# Patient Record
Sex: FEMALE | Race: BLACK OR AFRICAN AMERICAN | NOT HISPANIC OR LATINO | Employment: OTHER | URBAN - METROPOLITAN AREA
[De-identification: names, ages, dates, MRNs, and addresses within clinical notes are randomized per-mention and may not be internally consistent; named-entity substitution may affect disease eponyms.]

---

## 2018-01-01 ENCOUNTER — APPOINTMENT (OUTPATIENT)
Dept: GENERAL RADIOLOGY | Facility: HOSPITAL | Age: 83
End: 2018-01-01

## 2018-01-01 ENCOUNTER — APPOINTMENT (OUTPATIENT)
Dept: ULTRASOUND IMAGING | Facility: HOSPITAL | Age: 83
End: 2018-01-01

## 2018-01-01 ENCOUNTER — TELEPHONE (OUTPATIENT)
Dept: ORTHOPEDIC SURGERY | Facility: CLINIC | Age: 83
End: 2018-01-01

## 2018-01-01 ENCOUNTER — ANESTHESIA (OUTPATIENT)
Dept: PERIOP | Facility: HOSPITAL | Age: 83
End: 2018-01-01

## 2018-01-01 ENCOUNTER — ANESTHESIA EVENT (OUTPATIENT)
Dept: PERIOP | Facility: HOSPITAL | Age: 83
End: 2018-01-01

## 2018-01-01 ENCOUNTER — APPOINTMENT (OUTPATIENT)
Dept: CT IMAGING | Facility: HOSPITAL | Age: 83
End: 2018-01-01

## 2018-01-01 ENCOUNTER — HOSPITAL ENCOUNTER (INPATIENT)
Facility: HOSPITAL | Age: 83
LOS: 9 days | Discharge: NURSING, REHAB OR LONG-TERM CARE | End: 2018-12-18
Attending: EMERGENCY MEDICINE | Admitting: INTERNAL MEDICINE

## 2018-01-01 VITALS
HEIGHT: 63 IN | BODY MASS INDEX: 26.58 KG/M2 | TEMPERATURE: 98.3 F | DIASTOLIC BLOOD PRESSURE: 87 MMHG | OXYGEN SATURATION: 99 % | HEART RATE: 107 BPM | RESPIRATION RATE: 18 BRPM | SYSTOLIC BLOOD PRESSURE: 111 MMHG | WEIGHT: 150 LBS

## 2018-01-01 DIAGNOSIS — R26.89 DECREASED MOBILITY: ICD-10-CM

## 2018-01-01 DIAGNOSIS — I10 ESSENTIAL HYPERTENSION: ICD-10-CM

## 2018-01-01 DIAGNOSIS — S72.002A CLOSED FRACTURE OF LEFT HIP, INITIAL ENCOUNTER (HCC): ICD-10-CM

## 2018-01-01 DIAGNOSIS — N93.8 DUB (DYSFUNCTIONAL UTERINE BLEEDING): ICD-10-CM
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
## 2018-01-01 LAB
ABO + RH BLD: NORMAL
ABO GROUP BLD: NORMAL
ABO GROUP BLD: NORMAL
ALBUMIN SERPL-MCNC: 3.4 G/DL (ref 3.5–5.2)
ALBUMIN SERPL-MCNC: 4 G/DL (ref 3.5–5.2)
ALBUMIN/GLOB SERPL: 1 G/DL
ALBUMIN/GLOB SERPL: 1.1 G/DL
ALP SERPL-CCNC: 78 U/L (ref 39–117)
ALP SERPL-CCNC: 98 U/L (ref 39–117)
ALT SERPL W P-5'-P-CCNC: 6 U/L (ref 1–33)
ALT SERPL W P-5'-P-CCNC: 7 U/L (ref 1–33)
ANION GAP SERPL CALCULATED.3IONS-SCNC: 10.6 MMOL/L
ANION GAP SERPL CALCULATED.3IONS-SCNC: 11.2 MMOL/L
ANION GAP SERPL CALCULATED.3IONS-SCNC: 11.5 MMOL/L
ANION GAP SERPL CALCULATED.3IONS-SCNC: 11.9 MMOL/L
ANION GAP SERPL CALCULATED.3IONS-SCNC: 13.1 MMOL/L
ANION GAP SERPL CALCULATED.3IONS-SCNC: 13.2 MMOL/L
ANION GAP SERPL CALCULATED.3IONS-SCNC: 14.3 MMOL/L
ANION GAP SERPL CALCULATED.3IONS-SCNC: 15 MMOL/L
ANION GAP SERPL CALCULATED.3IONS-SCNC: 17.7 MMOL/L
AST SERPL-CCNC: 14 U/L (ref 1–32)
AST SERPL-CCNC: 17 U/L (ref 1–32)
BACTERIA UR QL AUTO: ABNORMAL /HPF
BASOPHILS # BLD AUTO: 0 10*3/MM3 (ref 0–0.2)
BASOPHILS # BLD AUTO: 0.01 10*3/MM3 (ref 0–0.2)
BASOPHILS # BLD AUTO: 0.02 10*3/MM3 (ref 0–0.2)
BASOPHILS NFR BLD AUTO: 0 % (ref 0–1.5)
BASOPHILS NFR BLD AUTO: 0.1 % (ref 0–1.5)
BASOPHILS NFR BLD AUTO: 0.2 % (ref 0–1.5)
BH BB BLOOD EXPIRATION DATE: NORMAL
BH BB BLOOD TYPE BARCODE: 5100
BH BB DISPENSE STATUS: NORMAL
BH BB PRODUCT CODE: NORMAL
BH BB UNIT NUMBER: NORMAL
BILIRUB SERPL-MCNC: 0.7 MG/DL (ref 0.1–1.2)
BILIRUB SERPL-MCNC: 0.9 MG/DL (ref 0.1–1.2)
BILIRUB UR QL STRIP: NEGATIVE
BLD GP AB SCN SERPL QL: NEGATIVE
BLD GP AB SCN SERPL QL: NEGATIVE
BUN BLD-MCNC: 10 MG/DL (ref 8–23)
BUN BLD-MCNC: 11 MG/DL (ref 8–23)
BUN BLD-MCNC: 14 MG/DL (ref 8–23)
BUN BLD-MCNC: 14 MG/DL (ref 8–23)
BUN BLD-MCNC: 15 MG/DL (ref 8–23)
BUN BLD-MCNC: 17 MG/DL (ref 8–23)
BUN BLD-MCNC: 23 MG/DL (ref 8–23)
BUN BLD-MCNC: 6 MG/DL (ref 8–23)
BUN BLD-MCNC: 9 MG/DL (ref 8–23)
BUN/CREAT SERPL: 10.2 (ref 7–25)
BUN/CREAT SERPL: 11.8 (ref 7–25)
BUN/CREAT SERPL: 12.7 (ref 7–25)
BUN/CREAT SERPL: 16.1 (ref 7–25)
BUN/CREAT SERPL: 17.7 (ref 7–25)
BUN/CREAT SERPL: 21.2 (ref 7–25)
BUN/CREAT SERPL: 24.2 (ref 7–25)
BUN/CREAT SERPL: 25.4 (ref 7–25)
BUN/CREAT SERPL: 34.3 (ref 7–25)
CALCIUM SPEC-SCNC: 8.3 MG/DL (ref 8.6–10.5)
CALCIUM SPEC-SCNC: 8.3 MG/DL (ref 8.6–10.5)
CALCIUM SPEC-SCNC: 8.5 MG/DL (ref 8.6–10.5)
CALCIUM SPEC-SCNC: 8.6 MG/DL (ref 8.6–10.5)
CALCIUM SPEC-SCNC: 8.6 MG/DL (ref 8.6–10.5)
CALCIUM SPEC-SCNC: 8.9 MG/DL (ref 8.6–10.5)
CALCIUM SPEC-SCNC: 8.9 MG/DL (ref 8.6–10.5)
CALCIUM SPEC-SCNC: 9 MG/DL (ref 8.6–10.5)
CALCIUM SPEC-SCNC: 9.5 MG/DL (ref 8.6–10.5)
CHLORIDE SERPL-SCNC: 101 MMOL/L (ref 98–107)
CHLORIDE SERPL-SCNC: 103 MMOL/L (ref 98–107)
CHLORIDE SERPL-SCNC: 104 MMOL/L (ref 98–107)
CHLORIDE SERPL-SCNC: 105 MMOL/L (ref 98–107)
CHLORIDE SERPL-SCNC: 105 MMOL/L (ref 98–107)
CHLORIDE SERPL-SCNC: 106 MMOL/L (ref 98–107)
CHLORIDE SERPL-SCNC: 108 MMOL/L (ref 98–107)
CK SERPL-CCNC: 150 U/L (ref 20–180)
CK SERPL-CCNC: 224 U/L (ref 20–180)
CLARITY UR: CLEAR
CO2 SERPL-SCNC: 21.3 MMOL/L (ref 22–29)
CO2 SERPL-SCNC: 21.7 MMOL/L (ref 22–29)
CO2 SERPL-SCNC: 21.8 MMOL/L (ref 22–29)
CO2 SERPL-SCNC: 22 MMOL/L (ref 22–29)
CO2 SERPL-SCNC: 24.1 MMOL/L (ref 22–29)
CO2 SERPL-SCNC: 24.5 MMOL/L (ref 22–29)
CO2 SERPL-SCNC: 24.9 MMOL/L (ref 22–29)
CO2 SERPL-SCNC: 25.8 MMOL/L (ref 22–29)
CO2 SERPL-SCNC: 26.4 MMOL/L (ref 22–29)
COLOR UR: YELLOW
CREAT BLD-MCNC: 0.59 MG/DL (ref 0.57–1)
CREAT BLD-MCNC: 0.62 MG/DL (ref 0.57–1)
CREAT BLD-MCNC: 0.62 MG/DL (ref 0.57–1)
CREAT BLD-MCNC: 0.66 MG/DL (ref 0.57–1)
CREAT BLD-MCNC: 0.67 MG/DL (ref 0.57–1)
CREAT BLD-MCNC: 0.67 MG/DL (ref 0.57–1)
CREAT BLD-MCNC: 0.71 MG/DL (ref 0.57–1)
CREAT BLD-MCNC: 0.85 MG/DL (ref 0.57–1)
CREAT BLD-MCNC: 0.87 MG/DL (ref 0.57–1)
DEPRECATED RDW RBC AUTO: 43.2 FL (ref 37–54)
DEPRECATED RDW RBC AUTO: 43.8 FL (ref 37–54)
DEPRECATED RDW RBC AUTO: 43.8 FL (ref 37–54)
DEPRECATED RDW RBC AUTO: 43.9 FL (ref 37–54)
DEPRECATED RDW RBC AUTO: 44.2 FL (ref 37–54)
DEPRECATED RDW RBC AUTO: 44.8 FL (ref 37–54)
DEPRECATED RDW RBC AUTO: 44.9 FL (ref 37–54)
DEPRECATED RDW RBC AUTO: 45.2 FL (ref 37–54)
DEPRECATED RDW RBC AUTO: 45.3 FL (ref 37–54)
DEPRECATED RDW RBC AUTO: 45.4 FL (ref 37–54)
EOSINOPHIL # BLD AUTO: 0 10*3/MM3 (ref 0–0.7)
EOSINOPHIL # BLD AUTO: 0 10*3/MM3 (ref 0–0.7)
EOSINOPHIL # BLD AUTO: 0.01 10*3/MM3 (ref 0–0.7)
EOSINOPHIL # BLD AUTO: 0.03 10*3/MM3 (ref 0–0.7)
EOSINOPHIL # BLD AUTO: 0.18 10*3/MM3 (ref 0–0.7)
EOSINOPHIL # BLD AUTO: 0.22 10*3/MM3 (ref 0–0.7)
EOSINOPHIL # BLD AUTO: 0.26 10*3/MM3 (ref 0–0.7)
EOSINOPHIL # BLD AUTO: 0.32 10*3/MM3 (ref 0–0.7)
EOSINOPHIL NFR BLD AUTO: 0 % (ref 0.3–6.2)
EOSINOPHIL NFR BLD AUTO: 0 % (ref 0.3–6.2)
EOSINOPHIL NFR BLD AUTO: 0.1 % (ref 0.3–6.2)
EOSINOPHIL NFR BLD AUTO: 0.2 % (ref 0.3–6.2)
EOSINOPHIL NFR BLD AUTO: 1.8 % (ref 0.3–6.2)
EOSINOPHIL NFR BLD AUTO: 2.2 % (ref 0.3–6.2)
EOSINOPHIL NFR BLD AUTO: 2.3 % (ref 0.3–6.2)
EOSINOPHIL NFR BLD AUTO: 2.5 % (ref 0.3–6.2)
ERYTHROCYTE [DISTWIDTH] IN BLOOD BY AUTOMATED COUNT: 12.9 % (ref 11.7–13)
ERYTHROCYTE [DISTWIDTH] IN BLOOD BY AUTOMATED COUNT: 13 % (ref 11.7–13)
ERYTHROCYTE [DISTWIDTH] IN BLOOD BY AUTOMATED COUNT: 13 % (ref 11.7–13)
ERYTHROCYTE [DISTWIDTH] IN BLOOD BY AUTOMATED COUNT: 13.1 % (ref 11.7–13)
ERYTHROCYTE [DISTWIDTH] IN BLOOD BY AUTOMATED COUNT: 13.3 % (ref 11.7–13)
ERYTHROCYTE [DISTWIDTH] IN BLOOD BY AUTOMATED COUNT: 13.6 % (ref 11.7–13)
ERYTHROCYTE [DISTWIDTH] IN BLOOD BY AUTOMATED COUNT: 13.8 % (ref 11.7–13)
FOLATE SERPL-MCNC: 4.77 NG/ML (ref 4.78–24.2)
GFR SERPL CREATININE-BSD FRML MDRD: 100 ML/MIN/1.73
GFR SERPL CREATININE-BSD FRML MDRD: 100 ML/MIN/1.73
GFR SERPL CREATININE-BSD FRML MDRD: 102 ML/MIN/1.73
GFR SERPL CREATININE-BSD FRML MDRD: 110 ML/MIN/1.73
GFR SERPL CREATININE-BSD FRML MDRD: 110 ML/MIN/1.73
GFR SERPL CREATININE-BSD FRML MDRD: 116 ML/MIN/1.73
GFR SERPL CREATININE-BSD FRML MDRD: 74 ML/MIN/1.73
GFR SERPL CREATININE-BSD FRML MDRD: 76 ML/MIN/1.73
GFR SERPL CREATININE-BSD FRML MDRD: 94 ML/MIN/1.73
GLOBULIN UR ELPH-MCNC: 3.3 GM/DL
GLOBULIN UR ELPH-MCNC: 3.8 GM/DL
GLUCOSE BLD-MCNC: 112 MG/DL (ref 65–99)
GLUCOSE BLD-MCNC: 120 MG/DL (ref 65–99)
GLUCOSE BLD-MCNC: 132 MG/DL (ref 65–99)
GLUCOSE BLD-MCNC: 148 MG/DL (ref 65–99)
GLUCOSE BLD-MCNC: 166 MG/DL (ref 65–99)
GLUCOSE BLD-MCNC: 182 MG/DL (ref 65–99)
GLUCOSE BLD-MCNC: 187 MG/DL (ref 65–99)
GLUCOSE BLD-MCNC: 218 MG/DL (ref 65–99)
GLUCOSE BLD-MCNC: 253 MG/DL (ref 65–99)
GLUCOSE BLDC GLUCOMTR-MCNC: 112 MG/DL (ref 70–130)
GLUCOSE BLDC GLUCOMTR-MCNC: 112 MG/DL (ref 70–130)
GLUCOSE BLDC GLUCOMTR-MCNC: 118 MG/DL (ref 70–130)
GLUCOSE BLDC GLUCOMTR-MCNC: 122 MG/DL (ref 70–130)
GLUCOSE BLDC GLUCOMTR-MCNC: 124 MG/DL (ref 70–130)
GLUCOSE BLDC GLUCOMTR-MCNC: 125 MG/DL (ref 70–130)
GLUCOSE BLDC GLUCOMTR-MCNC: 125 MG/DL (ref 70–130)
GLUCOSE BLDC GLUCOMTR-MCNC: 128 MG/DL (ref 70–130)
GLUCOSE BLDC GLUCOMTR-MCNC: 129 MG/DL (ref 70–130)
GLUCOSE BLDC GLUCOMTR-MCNC: 131 MG/DL (ref 70–130)
GLUCOSE BLDC GLUCOMTR-MCNC: 132 MG/DL (ref 70–130)
GLUCOSE BLDC GLUCOMTR-MCNC: 132 MG/DL (ref 70–130)
GLUCOSE BLDC GLUCOMTR-MCNC: 133 MG/DL (ref 70–130)
GLUCOSE BLDC GLUCOMTR-MCNC: 138 MG/DL (ref 70–130)
GLUCOSE BLDC GLUCOMTR-MCNC: 146 MG/DL (ref 70–130)
GLUCOSE BLDC GLUCOMTR-MCNC: 151 MG/DL (ref 70–130)
GLUCOSE BLDC GLUCOMTR-MCNC: 152 MG/DL (ref 70–130)
GLUCOSE BLDC GLUCOMTR-MCNC: 152 MG/DL (ref 70–130)
GLUCOSE BLDC GLUCOMTR-MCNC: 157 MG/DL (ref 70–130)
GLUCOSE BLDC GLUCOMTR-MCNC: 172 MG/DL (ref 70–130)
GLUCOSE BLDC GLUCOMTR-MCNC: 176 MG/DL (ref 70–130)
GLUCOSE BLDC GLUCOMTR-MCNC: 177 MG/DL (ref 70–130)
GLUCOSE BLDC GLUCOMTR-MCNC: 180 MG/DL (ref 70–130)
GLUCOSE BLDC GLUCOMTR-MCNC: 183 MG/DL (ref 70–130)
GLUCOSE BLDC GLUCOMTR-MCNC: 193 MG/DL (ref 70–130)
GLUCOSE BLDC GLUCOMTR-MCNC: 198 MG/DL (ref 70–130)
GLUCOSE BLDC GLUCOMTR-MCNC: 199 MG/DL (ref 70–130)
GLUCOSE BLDC GLUCOMTR-MCNC: 212 MG/DL (ref 70–130)
GLUCOSE BLDC GLUCOMTR-MCNC: 220 MG/DL (ref 70–130)
GLUCOSE BLDC GLUCOMTR-MCNC: 245 MG/DL (ref 70–130)
GLUCOSE BLDC GLUCOMTR-MCNC: 250 MG/DL (ref 70–130)
GLUCOSE BLDC GLUCOMTR-MCNC: 88 MG/DL (ref 70–130)
GLUCOSE BLDC GLUCOMTR-MCNC: 91 MG/DL (ref 70–130)
GLUCOSE BLDC GLUCOMTR-MCNC: 91 MG/DL (ref 70–130)
GLUCOSE UR STRIP-MCNC: ABNORMAL MG/DL
HBA1C MFR BLD: 6.9 % (ref 4.8–5.6)
HCT VFR BLD AUTO: 23.8 % (ref 35.6–45.5)
HCT VFR BLD AUTO: 24.5 % (ref 35.6–45.5)
HCT VFR BLD AUTO: 25.1 % (ref 35.6–45.5)
HCT VFR BLD AUTO: 27.6 % (ref 35.6–45.5)
HCT VFR BLD AUTO: 28 % (ref 35.6–45.5)
HCT VFR BLD AUTO: 28.9 % (ref 35.6–45.5)
HCT VFR BLD AUTO: 29.7 % (ref 35.6–45.5)
HCT VFR BLD AUTO: 30.9 % (ref 35.6–45.5)
HCT VFR BLD AUTO: 32.2 % (ref 35.6–45.5)
HCT VFR BLD AUTO: 39.5 % (ref 35.6–45.5)
HCT VFR BLD AUTO: 40.7 % (ref 35.6–45.5)
HCT VFR BLD AUTO: 45.3 % (ref 35.6–45.5)
HGB BLD-MCNC: 10 G/DL (ref 11.9–15.5)
HGB BLD-MCNC: 10.4 G/DL (ref 11.9–15.5)
HGB BLD-MCNC: 12.2 G/DL (ref 11.9–15.5)
HGB BLD-MCNC: 13.4 G/DL (ref 11.9–15.5)
HGB BLD-MCNC: 14.3 G/DL (ref 11.9–15.5)
HGB BLD-MCNC: 7.5 G/DL (ref 11.9–15.5)
HGB BLD-MCNC: 7.8 G/DL (ref 11.9–15.5)
HGB BLD-MCNC: 8.1 G/DL (ref 11.9–15.5)
HGB BLD-MCNC: 8.6 G/DL (ref 11.9–15.5)
HGB BLD-MCNC: 8.9 G/DL (ref 11.9–15.5)
HGB BLD-MCNC: 9.1 G/DL (ref 11.9–15.5)
HGB BLD-MCNC: 9.7 G/DL (ref 11.9–15.5)
HGB UR QL STRIP.AUTO: ABNORMAL
HYALINE CASTS UR QL AUTO: ABNORMAL /LPF
IMM GRANULOCYTES # BLD: 0.02 10*3/MM3 (ref 0–0.03)
IMM GRANULOCYTES # BLD: 0.04 10*3/MM3 (ref 0–0.03)
IMM GRANULOCYTES # BLD: 0.05 10*3/MM3 (ref 0–0.03)
IMM GRANULOCYTES # BLD: 0.05 10*3/MM3 (ref 0–0.03)
IMM GRANULOCYTES # BLD: 0.06 10*3/MM3 (ref 0–0.03)
IMM GRANULOCYTES # BLD: 0.1 10*3/MM3 (ref 0–0.03)
IMM GRANULOCYTES # BLD: 0.1 10*3/MM3 (ref 0–0.03)
IMM GRANULOCYTES # BLD: 0.13 10*3/MM3 (ref 0–0.03)
IMM GRANULOCYTES NFR BLD: 0.2 % (ref 0–0.5)
IMM GRANULOCYTES NFR BLD: 0.3 % (ref 0–0.5)
IMM GRANULOCYTES NFR BLD: 0.4 % (ref 0–0.5)
IMM GRANULOCYTES NFR BLD: 0.5 % (ref 0–0.5)
IMM GRANULOCYTES NFR BLD: 0.6 % (ref 0–0.5)
IMM GRANULOCYTES NFR BLD: 0.8 % (ref 0–0.5)
IMM GRANULOCYTES NFR BLD: 0.8 % (ref 0–0.5)
IMM GRANULOCYTES NFR BLD: 1.1 % (ref 0–0.5)
INR PPP: 1.21 (ref 0.9–1.1)
INR PPP: 1.3 (ref 0.9–1.1)
INR PPP: 1.33 (ref 0.9–1.1)
IRON 24H UR-MRATE: 36 MCG/DL (ref 37–145)
IRON SATN MFR SERPL: 16 % (ref 20–50)
KETONES UR QL STRIP: ABNORMAL
LEUKOCYTE ESTERASE UR QL STRIP.AUTO: ABNORMAL
LYMPHOCYTES # BLD AUTO: 1.21 10*3/MM3 (ref 0.9–4.8)
LYMPHOCYTES # BLD AUTO: 1.24 10*3/MM3 (ref 0.9–4.8)
LYMPHOCYTES # BLD AUTO: 1.79 10*3/MM3 (ref 0.9–4.8)
LYMPHOCYTES # BLD AUTO: 2.15 10*3/MM3 (ref 0.9–4.8)
LYMPHOCYTES # BLD AUTO: 2.25 10*3/MM3 (ref 0.9–4.8)
LYMPHOCYTES # BLD AUTO: 2.52 10*3/MM3 (ref 0.9–4.8)
LYMPHOCYTES # BLD AUTO: 2.64 10*3/MM3 (ref 0.9–4.8)
LYMPHOCYTES # BLD AUTO: 2.88 10*3/MM3 (ref 0.9–4.8)
LYMPHOCYTES NFR BLD AUTO: 10.4 % (ref 19.6–45.3)
LYMPHOCYTES NFR BLD AUTO: 20.2 % (ref 19.6–45.3)
LYMPHOCYTES NFR BLD AUTO: 20.3 % (ref 19.6–45.3)
LYMPHOCYTES NFR BLD AUTO: 21.2 % (ref 19.6–45.3)
LYMPHOCYTES NFR BLD AUTO: 21.2 % (ref 19.6–45.3)
LYMPHOCYTES NFR BLD AUTO: 23.8 % (ref 19.6–45.3)
LYMPHOCYTES NFR BLD AUTO: 24.6 % (ref 19.6–45.3)
LYMPHOCYTES NFR BLD AUTO: 9.7 % (ref 19.6–45.3)
MAGNESIUM SERPL-MCNC: 2 MG/DL (ref 1.6–2.4)
MAGNESIUM SERPL-MCNC: 2.3 MG/DL (ref 1.6–2.4)
MCH RBC QN AUTO: 29.3 PG (ref 26.9–32)
MCH RBC QN AUTO: 29.5 PG (ref 26.9–32)
MCH RBC QN AUTO: 29.5 PG (ref 26.9–32)
MCH RBC QN AUTO: 29.6 PG (ref 26.9–32)
MCH RBC QN AUTO: 29.7 PG (ref 26.9–32)
MCH RBC QN AUTO: 30 PG (ref 26.9–32)
MCH RBC QN AUTO: 30.3 PG (ref 26.9–32)
MCH RBC QN AUTO: 30.4 PG (ref 26.9–32)
MCHC RBC AUTO-ENTMCNC: 30.8 G/DL (ref 32.4–36.3)
MCHC RBC AUTO-ENTMCNC: 30.9 G/DL (ref 32.4–36.3)
MCHC RBC AUTO-ENTMCNC: 31.2 G/DL (ref 32.4–36.3)
MCHC RBC AUTO-ENTMCNC: 31.5 G/DL (ref 32.4–36.3)
MCHC RBC AUTO-ENTMCNC: 31.6 G/DL (ref 32.4–36.3)
MCHC RBC AUTO-ENTMCNC: 32.3 G/DL (ref 32.4–36.3)
MCHC RBC AUTO-ENTMCNC: 32.3 G/DL (ref 32.4–36.3)
MCHC RBC AUTO-ENTMCNC: 32.4 G/DL (ref 32.4–36.3)
MCHC RBC AUTO-ENTMCNC: 32.5 G/DL (ref 32.4–36.3)
MCHC RBC AUTO-ENTMCNC: 32.9 G/DL (ref 32.4–36.3)
MCV RBC AUTO: 90.9 FL (ref 80.5–98.2)
MCV RBC AUTO: 91.2 FL (ref 80.5–98.2)
MCV RBC AUTO: 91.4 FL (ref 80.5–98.2)
MCV RBC AUTO: 91.9 FL (ref 80.5–98.2)
MCV RBC AUTO: 92.1 FL (ref 80.5–98.2)
MCV RBC AUTO: 94.1 FL (ref 80.5–98.2)
MCV RBC AUTO: 95.1 FL (ref 80.5–98.2)
MCV RBC AUTO: 95.9 FL (ref 80.5–98.2)
MCV RBC AUTO: 96.2 FL (ref 80.5–98.2)
MCV RBC AUTO: 96.4 FL (ref 80.5–98.2)
MONOCYTES # BLD AUTO: 0.55 10*3/MM3 (ref 0.2–1.2)
MONOCYTES # BLD AUTO: 0.7 10*3/MM3 (ref 0.2–1.2)
MONOCYTES # BLD AUTO: 0.77 10*3/MM3 (ref 0.2–1.2)
MONOCYTES # BLD AUTO: 0.78 10*3/MM3 (ref 0.2–1.2)
MONOCYTES # BLD AUTO: 0.82 10*3/MM3 (ref 0.2–1.2)
MONOCYTES # BLD AUTO: 0.87 10*3/MM3 (ref 0.2–1.2)
MONOCYTES # BLD AUTO: 0.93 10*3/MM3 (ref 0.2–1.2)
MONOCYTES # BLD AUTO: 0.93 10*3/MM3 (ref 0.2–1.2)
MONOCYTES NFR BLD AUTO: 5.8 % (ref 5–12)
MONOCYTES NFR BLD AUTO: 6.3 % (ref 5–12)
MONOCYTES NFR BLD AUTO: 6.5 % (ref 5–12)
MONOCYTES NFR BLD AUTO: 6.6 % (ref 5–12)
MONOCYTES NFR BLD AUTO: 7.5 % (ref 5–12)
MONOCYTES NFR BLD AUTO: 7.5 % (ref 5–12)
MONOCYTES NFR BLD AUTO: 8.2 % (ref 5–12)
MONOCYTES NFR BLD AUTO: 8.6 % (ref 5–12)
NEUTROPHILS # BLD AUTO: 10.02 10*3/MM3 (ref 1.9–8.1)
NEUTROPHILS # BLD AUTO: 10.26 10*3/MM3 (ref 1.9–8.1)
NEUTROPHILS # BLD AUTO: 6.08 10*3/MM3 (ref 1.9–8.1)
NEUTROPHILS # BLD AUTO: 6.2 10*3/MM3 (ref 1.9–8.1)
NEUTROPHILS # BLD AUTO: 6.9 10*3/MM3 (ref 1.9–8.1)
NEUTROPHILS # BLD AUTO: 7.78 10*3/MM3 (ref 1.9–8.1)
NEUTROPHILS # BLD AUTO: 8.99 10*3/MM3 (ref 1.9–8.1)
NEUTROPHILS # BLD AUTO: 9.23 10*3/MM3 (ref 1.9–8.1)
NEUTROPHILS NFR BLD AUTO: 65.6 % (ref 42.7–76)
NEUTROPHILS NFR BLD AUTO: 66.5 % (ref 42.7–76)
NEUTROPHILS NFR BLD AUTO: 67.8 % (ref 42.7–76)
NEUTROPHILS NFR BLD AUTO: 70.7 % (ref 42.7–76)
NEUTROPHILS NFR BLD AUTO: 71.9 % (ref 42.7–76)
NEUTROPHILS NFR BLD AUTO: 72 % (ref 42.7–76)
NEUTROPHILS NFR BLD AUTO: 82.5 % (ref 42.7–76)
NEUTROPHILS NFR BLD AUTO: 83.7 % (ref 42.7–76)
NITRITE UR QL STRIP: NEGATIVE
NRBC BLD MANUAL-RTO: 0 /100 WBC (ref 0–0)
NRBC BLD MANUAL-RTO: 0.3 /100 WBC (ref 0–0)
NT-PROBNP SERPL-MCNC: 533.6 PG/ML (ref 0–1800)
PH UR STRIP.AUTO: 7 [PH] (ref 5–8)
PLATELET # BLD AUTO: 185 10*3/MM3 (ref 140–500)
PLATELET # BLD AUTO: 200 10*3/MM3 (ref 140–500)
PLATELET # BLD AUTO: 212 10*3/MM3 (ref 140–500)
PLATELET # BLD AUTO: 217 10*3/MM3 (ref 140–500)
PLATELET # BLD AUTO: 244 10*3/MM3 (ref 140–500)
PLATELET # BLD AUTO: 257 10*3/MM3 (ref 140–500)
PLATELET # BLD AUTO: 263 10*3/MM3 (ref 140–500)
PLATELET # BLD AUTO: 284 10*3/MM3 (ref 140–500)
PLATELET # BLD AUTO: 288 10*3/MM3 (ref 140–500)
PLATELET # BLD AUTO: 323 10*3/MM3 (ref 140–500)
PMV BLD AUTO: 10 FL (ref 6–12)
PMV BLD AUTO: 10.1 FL (ref 6–12)
PMV BLD AUTO: 10.5 FL (ref 6–12)
PMV BLD AUTO: 10.5 FL (ref 6–12)
PMV BLD AUTO: 10.6 FL (ref 6–12)
PMV BLD AUTO: 10.8 FL (ref 6–12)
PMV BLD AUTO: 10.9 FL (ref 6–12)
PMV BLD AUTO: 11.1 FL (ref 6–12)
PMV BLD AUTO: 11.2 FL (ref 6–12)
PMV BLD AUTO: 11.5 FL (ref 6–12)
POTASSIUM BLD-SCNC: 3.1 MMOL/L (ref 3.5–5.2)
POTASSIUM BLD-SCNC: 3.3 MMOL/L (ref 3.5–5.2)
POTASSIUM BLD-SCNC: 3.5 MMOL/L (ref 3.5–5.2)
POTASSIUM BLD-SCNC: 3.6 MMOL/L (ref 3.5–5.2)
POTASSIUM BLD-SCNC: 3.9 MMOL/L (ref 3.5–5.2)
POTASSIUM BLD-SCNC: 4.2 MMOL/L (ref 3.5–5.2)
POTASSIUM BLD-SCNC: 4.4 MMOL/L (ref 3.5–5.2)
POTASSIUM BLD-SCNC: 4.6 MMOL/L (ref 3.5–5.2)
PROT SERPL-MCNC: 6.7 G/DL (ref 6–8.5)
PROT SERPL-MCNC: 7.8 G/DL (ref 6–8.5)
PROT UR QL STRIP: ABNORMAL
PROTHROMBIN TIME: 15.1 SECONDS (ref 11.7–14.2)
PROTHROMBIN TIME: 16 SECONDS (ref 11.7–14.2)
PROTHROMBIN TIME: 16.2 SECONDS (ref 11.7–14.2)
RBC # BLD AUTO: 2.53 10*6/MM3 (ref 3.9–5.2)
RBC # BLD AUTO: 2.73 10*6/MM3 (ref 3.9–5.2)
RBC # BLD AUTO: 2.87 10*6/MM3 (ref 3.9–5.2)
RBC # BLD AUTO: 3.04 10*6/MM3 (ref 3.9–5.2)
RBC # BLD AUTO: 3.08 10*6/MM3 (ref 3.9–5.2)
RBC # BLD AUTO: 3.38 10*6/MM3 (ref 3.9–5.2)
RBC # BLD AUTO: 3.53 10*6/MM3 (ref 3.9–5.2)
RBC # BLD AUTO: 4.12 10*6/MM3 (ref 3.9–5.2)
RBC # BLD AUTO: 4.42 10*6/MM3 (ref 3.9–5.2)
RBC # BLD AUTO: 4.7 10*6/MM3 (ref 3.9–5.2)
RBC # UR: ABNORMAL /HPF
REF LAB TEST METHOD: ABNORMAL
RETICS/RBC NFR AUTO: 7.23 % (ref 0.5–1.5)
RH BLD: POSITIVE
RH BLD: POSITIVE
SODIUM BLD-SCNC: 140 MMOL/L (ref 136–145)
SODIUM BLD-SCNC: 140 MMOL/L (ref 136–145)
SODIUM BLD-SCNC: 141 MMOL/L (ref 136–145)
SODIUM BLD-SCNC: 142 MMOL/L (ref 136–145)
SODIUM BLD-SCNC: 144 MMOL/L (ref 136–145)
SP GR UR STRIP: 1.02 (ref 1–1.03)
SQUAMOUS #/AREA URNS HPF: ABNORMAL /HPF
T&S EXPIRATION DATE: NORMAL
T&S EXPIRATION DATE: NORMAL
TIBC SERPL-MCNC: 221 MCG/DL
TRANSFERRIN SERPL-MCNC: 148 MG/DL (ref 200–360)
TROPONIN T SERPL-MCNC: <0.01 NG/ML (ref 0–0.03)
TSH SERPL DL<=0.05 MIU/L-ACNC: 2.22 MIU/ML (ref 0.27–4.2)
UNIT  ABO: NORMAL
UNIT  RH: NORMAL
UROBILINOGEN UR QL STRIP: ABNORMAL
VIT B12 BLD-MCNC: 327 PG/ML (ref 211–946)
WBC NRBC COR # BLD: 10.16 10*3/MM3 (ref 4.5–10.7)
WBC NRBC COR # BLD: 11.7 10*3/MM3 (ref 4.5–10.7)
WBC NRBC COR # BLD: 11.97 10*3/MM3 (ref 4.5–10.7)
WBC NRBC COR # BLD: 12.44 10*3/MM3 (ref 4.5–10.7)
WBC NRBC COR # BLD: 12.48 10*3/MM3 (ref 4.5–10.7)
WBC NRBC COR # BLD: 12.48 10*3/MM3 (ref 4.5–10.7)
WBC NRBC COR # BLD: 13.03 10*3/MM3 (ref 4.5–10.7)
WBC NRBC COR # BLD: 8.46 10*3/MM3 (ref 4.5–10.7)
WBC NRBC COR # BLD: 9.46 10*3/MM3 (ref 4.5–10.7)
WBC NRBC COR # BLD: 9.56 10*3/MM3 (ref 4.5–10.7)
WBC UR QL AUTO: ABNORMAL /HPF

## 2018-01-01 PROCEDURE — 97110 THERAPEUTIC EXERCISES: CPT

## 2018-01-01 PROCEDURE — 70450 CT HEAD/BRAIN W/O DYE: CPT

## 2018-01-01 PROCEDURE — 81001 URINALYSIS AUTO W/SCOPE: CPT | Performed by: EMERGENCY MEDICINE

## 2018-01-01 PROCEDURE — 85025 COMPLETE CBC W/AUTO DIFF WBC: CPT | Performed by: EMERGENCY MEDICINE

## 2018-01-01 PROCEDURE — C1751 CATH, INF, PER/CENT/MIDLINE: HCPCS

## 2018-01-01 PROCEDURE — 86850 RBC ANTIBODY SCREEN: CPT | Performed by: HOSPITALIST

## 2018-01-01 PROCEDURE — 82962 GLUCOSE BLOOD TEST: CPT

## 2018-01-01 PROCEDURE — 85014 HEMATOCRIT: CPT | Performed by: INTERNAL MEDICINE

## 2018-01-01 PROCEDURE — 85025 COMPLETE CBC W/AUTO DIFF WBC: CPT | Performed by: ORTHOPAEDIC SURGERY

## 2018-01-01 PROCEDURE — 25010000002 INFLUENZA VAC SUBUNIT QUAD 0.5 ML SUSPENSION PREFILLED SYRINGE: Performed by: INTERNAL MEDICINE

## 2018-01-01 PROCEDURE — 76830 TRANSVAGINAL US NON-OB: CPT

## 2018-01-01 PROCEDURE — 99284 EMERGENCY DEPT VISIT MOD MDM: CPT

## 2018-01-01 PROCEDURE — 25010000002 PHENYLEPHRINE PER 1 ML: Performed by: ANESTHESIOLOGY

## 2018-01-01 PROCEDURE — 85610 PROTHROMBIN TIME: CPT | Performed by: EMERGENCY MEDICINE

## 2018-01-01 PROCEDURE — 0QS704Z REPOSITION LEFT UPPER FEMUR WITH INTERNAL FIXATION DEVICE, OPEN APPROACH: ICD-10-PCS | Performed by: ORTHOPAEDIC SURGERY

## 2018-01-01 PROCEDURE — 82550 ASSAY OF CK (CPK): CPT | Performed by: INTERNAL MEDICINE

## 2018-01-01 PROCEDURE — 99222 1ST HOSP IP/OBS MODERATE 55: CPT | Performed by: ORTHOPAEDIC SURGERY

## 2018-01-01 PROCEDURE — 84466 ASSAY OF TRANSFERRIN: CPT | Performed by: HOSPITALIST

## 2018-01-01 PROCEDURE — 36430 TRANSFUSION BLD/BLD COMPNT: CPT

## 2018-01-01 PROCEDURE — 25010000003 CEFAZOLIN IN DEXTROSE 2-4 GM/100ML-% SOLUTION: Performed by: ORTHOPAEDIC SURGERY

## 2018-01-01 PROCEDURE — 80048 BASIC METABOLIC PNL TOTAL CA: CPT | Performed by: HOSPITALIST

## 2018-01-01 PROCEDURE — 27245 TREAT THIGH FRACTURE: CPT | Performed by: ORTHOPAEDIC SURGERY

## 2018-01-01 PROCEDURE — 63710000001 INSULIN LISPRO (HUMAN) PER 5 UNITS: Performed by: INTERNAL MEDICINE

## 2018-01-01 PROCEDURE — C1713 ANCHOR/SCREW BN/BN,TIS/BN: HCPCS | Performed by: ORTHOPAEDIC SURGERY

## 2018-01-01 PROCEDURE — 25010000002 ONDANSETRON PER 1 MG: Performed by: ANESTHESIOLOGY

## 2018-01-01 PROCEDURE — 25010000002 PROPOFOL 10 MG/ML EMULSION: Performed by: ANESTHESIOLOGY

## 2018-01-01 PROCEDURE — 80053 COMPREHEN METABOLIC PANEL: CPT | Performed by: INTERNAL MEDICINE

## 2018-01-01 PROCEDURE — 84132 ASSAY OF SERUM POTASSIUM: CPT | Performed by: INTERNAL MEDICINE

## 2018-01-01 PROCEDURE — 85025 COMPLETE CBC W/AUTO DIFF WBC: CPT | Performed by: INTERNAL MEDICINE

## 2018-01-01 PROCEDURE — 83540 ASSAY OF IRON: CPT | Performed by: HOSPITALIST

## 2018-01-01 PROCEDURE — 80048 BASIC METABOLIC PNL TOTAL CA: CPT | Performed by: INTERNAL MEDICINE

## 2018-01-01 PROCEDURE — 85018 HEMOGLOBIN: CPT | Performed by: INTERNAL MEDICINE

## 2018-01-01 PROCEDURE — 85025 COMPLETE CBC W/AUTO DIFF WBC: CPT | Performed by: HOSPITALIST

## 2018-01-01 PROCEDURE — 82607 VITAMIN B-12: CPT | Performed by: HOSPITALIST

## 2018-01-01 PROCEDURE — 25010000002 MORPHINE (PF) 10 MG/ML SOLUTION: Performed by: EMERGENCY MEDICINE

## 2018-01-01 PROCEDURE — 85027 COMPLETE CBC AUTOMATED: CPT | Performed by: HOSPITALIST

## 2018-01-01 PROCEDURE — 76856 US EXAM PELVIC COMPLETE: CPT

## 2018-01-01 PROCEDURE — 99231 SBSQ HOSP IP/OBS SF/LOW 25: CPT | Performed by: OBSTETRICS & GYNECOLOGY

## 2018-01-01 PROCEDURE — 86900 BLOOD TYPING SEROLOGIC ABO: CPT | Performed by: ORTHOPAEDIC SURGERY

## 2018-01-01 PROCEDURE — 82550 ASSAY OF CK (CPK): CPT | Performed by: EMERGENCY MEDICINE

## 2018-01-01 PROCEDURE — 76000 FLUOROSCOPY <1 HR PHYS/QHP: CPT

## 2018-01-01 PROCEDURE — C1769 GUIDE WIRE: HCPCS | Performed by: ORTHOPAEDIC SURGERY

## 2018-01-01 PROCEDURE — 85610 PROTHROMBIN TIME: CPT | Performed by: HOSPITALIST

## 2018-01-01 PROCEDURE — P9016 RBC LEUKOCYTES REDUCED: HCPCS

## 2018-01-01 PROCEDURE — 86923 COMPATIBILITY TEST ELECTRIC: CPT

## 2018-01-01 PROCEDURE — 86900 BLOOD TYPING SEROLOGIC ABO: CPT

## 2018-01-01 PROCEDURE — 73502 X-RAY EXAM HIP UNI 2-3 VIEWS: CPT

## 2018-01-01 PROCEDURE — 93010 ELECTROCARDIOGRAM REPORT: CPT | Performed by: INTERNAL MEDICINE

## 2018-01-01 PROCEDURE — 86901 BLOOD TYPING SEROLOGIC RH(D): CPT | Performed by: ORTHOPAEDIC SURGERY

## 2018-01-01 PROCEDURE — 83036 HEMOGLOBIN GLYCOSYLATED A1C: CPT | Performed by: INTERNAL MEDICINE

## 2018-01-01 PROCEDURE — 85018 HEMOGLOBIN: CPT | Performed by: HOSPITALIST

## 2018-01-01 PROCEDURE — 25010000002 FENTANYL CITRATE (PF) 100 MCG/2ML SOLUTION: Performed by: ANESTHESIOLOGY

## 2018-01-01 PROCEDURE — 84484 ASSAY OF TROPONIN QUANT: CPT | Performed by: EMERGENCY MEDICINE

## 2018-01-01 PROCEDURE — 84443 ASSAY THYROID STIM HORMONE: CPT | Performed by: HOSPITALIST

## 2018-01-01 PROCEDURE — 83735 ASSAY OF MAGNESIUM: CPT | Performed by: INTERNAL MEDICINE

## 2018-01-01 PROCEDURE — 25010000002 DEXAMETHASONE PER 1 MG: Performed by: ANESTHESIOLOGY

## 2018-01-01 PROCEDURE — 86850 RBC ANTIBODY SCREEN: CPT | Performed by: ORTHOPAEDIC SURGERY

## 2018-01-01 PROCEDURE — 85014 HEMATOCRIT: CPT | Performed by: HOSPITALIST

## 2018-01-01 PROCEDURE — 25010000002 MORPHINE (PF) 10 MG/ML SOLUTION: Performed by: INTERNAL MEDICINE

## 2018-01-01 PROCEDURE — 73560 X-RAY EXAM OF KNEE 1 OR 2: CPT

## 2018-01-01 PROCEDURE — 97162 PT EVAL MOD COMPLEX 30 MIN: CPT

## 2018-01-01 PROCEDURE — 90661 CCIIV3 VAC ABX FR 0.5 ML IM: CPT | Performed by: INTERNAL MEDICINE

## 2018-01-01 PROCEDURE — 85045 AUTOMATED RETICULOCYTE COUNT: CPT | Performed by: HOSPITALIST

## 2018-01-01 PROCEDURE — 80053 COMPREHEN METABOLIC PANEL: CPT | Performed by: EMERGENCY MEDICINE

## 2018-01-01 PROCEDURE — G0008 ADMIN INFLUENZA VIRUS VAC: HCPCS | Performed by: INTERNAL MEDICINE

## 2018-01-01 PROCEDURE — 86901 BLOOD TYPING SEROLOGIC RH(D): CPT | Performed by: HOSPITALIST

## 2018-01-01 PROCEDURE — 93005 ELECTROCARDIOGRAM TRACING: CPT | Performed by: EMERGENCY MEDICINE

## 2018-01-01 PROCEDURE — 71045 X-RAY EXAM CHEST 1 VIEW: CPT

## 2018-01-01 PROCEDURE — 93005 ELECTROCARDIOGRAM TRACING: CPT | Performed by: HOSPITALIST

## 2018-01-01 PROCEDURE — 82746 ASSAY OF FOLIC ACID SERUM: CPT | Performed by: HOSPITALIST

## 2018-01-01 PROCEDURE — 85610 PROTHROMBIN TIME: CPT | Performed by: OBSTETRICS & GYNECOLOGY

## 2018-01-01 PROCEDURE — 85027 COMPLETE CBC AUTOMATED: CPT | Performed by: OBSTETRICS & GYNECOLOGY

## 2018-01-01 PROCEDURE — 83880 ASSAY OF NATRIURETIC PEPTIDE: CPT | Performed by: EMERGENCY MEDICINE

## 2018-01-01 PROCEDURE — 86900 BLOOD TYPING SEROLOGIC ABO: CPT | Performed by: HOSPITALIST

## 2018-01-01 DEVICE — IMPLANTABLE DEVICE: Type: IMPLANTABLE DEVICE | Site: FEMUR | Status: FUNCTIONAL

## 2018-01-01 DEVICE — SCRW LK STRDRV TI 5X40M STRL: Type: IMPLANTABLE DEVICE | Site: FEMUR | Status: FUNCTIONAL

## 2018-01-01 DEVICE — SCRW CANN TFN ADV TI 10.35X85MM STRL: Type: IMPLANTABLE DEVICE | Site: FEMUR | Status: FUNCTIONAL

## 2018-01-01 RX ORDER — PANTOPRAZOLE SODIUM 40 MG/1
40 TABLET, DELAYED RELEASE ORAL EVERY MORNING
Status: DISCONTINUED | OUTPATIENT
Start: 2018-01-01 | End: 2018-01-01 | Stop reason: HOSPADM

## 2018-01-01 RX ORDER — MORPHINE SULFATE 2 MG/ML
4 INJECTION, SOLUTION INTRAMUSCULAR; INTRAVENOUS
Status: DISCONTINUED | OUTPATIENT
Start: 2018-01-01 | End: 2018-01-01 | Stop reason: HOSPADM

## 2018-01-01 RX ORDER — MORPHINE SULFATE 2 MG/ML
2 INJECTION, SOLUTION INTRAMUSCULAR; INTRAVENOUS ONCE
Status: COMPLETED | OUTPATIENT
Start: 2018-01-01 | End: 2018-01-01

## 2018-01-01 RX ORDER — SODIUM CHLORIDE 0.9 % (FLUSH) 0.9 %
10 SYRINGE (ML) INJECTION AS NEEDED
Status: DISCONTINUED | OUTPATIENT
Start: 2018-01-01 | End: 2018-01-01 | Stop reason: HOSPADM

## 2018-01-01 RX ORDER — PROMETHAZINE HYDROCHLORIDE 25 MG/ML
12.5 INJECTION, SOLUTION INTRAMUSCULAR; INTRAVENOUS ONCE AS NEEDED
Status: DISCONTINUED | OUTPATIENT
Start: 2018-01-01 | End: 2018-01-01 | Stop reason: HOSPADM

## 2018-01-01 RX ORDER — DEXAMETHASONE SODIUM PHOSPHATE 10 MG/ML
INJECTION INTRAMUSCULAR; INTRAVENOUS AS NEEDED
Status: DISCONTINUED | OUTPATIENT
Start: 2018-01-01 | End: 2018-01-01 | Stop reason: SURG

## 2018-01-01 RX ORDER — CEFAZOLIN SODIUM 2 G/100ML
2 INJECTION, SOLUTION INTRAVENOUS EVERY 8 HOURS
Status: COMPLETED | OUTPATIENT
Start: 2018-01-01 | End: 2018-01-01

## 2018-01-01 RX ORDER — SODIUM CHLORIDE 0.9 % (FLUSH) 0.9 %
20 SYRINGE (ML) INJECTION AS NEEDED
Status: DISCONTINUED | OUTPATIENT
Start: 2018-01-01 | End: 2018-01-01 | Stop reason: HOSPADM

## 2018-01-01 RX ORDER — ASPIRIN 325 MG
325 TABLET ORAL DAILY
Status: DISCONTINUED | OUTPATIENT
Start: 2018-01-01 | End: 2018-01-01

## 2018-01-01 RX ORDER — FAMOTIDINE 10 MG/ML
20 INJECTION, SOLUTION INTRAVENOUS ONCE
Status: COMPLETED | OUTPATIENT
Start: 2018-01-01 | End: 2018-01-01

## 2018-01-01 RX ORDER — SODIUM CHLORIDE, SODIUM LACTATE, POTASSIUM CHLORIDE, CALCIUM CHLORIDE 600; 310; 30; 20 MG/100ML; MG/100ML; MG/100ML; MG/100ML
9 INJECTION, SOLUTION INTRAVENOUS CONTINUOUS
Status: DISCONTINUED | OUTPATIENT
Start: 2018-01-01 | End: 2018-01-01

## 2018-01-01 RX ORDER — BISACODYL 10 MG
10 SUPPOSITORY, RECTAL RECTAL DAILY PRN
Status: DISCONTINUED | OUTPATIENT
Start: 2018-01-01 | End: 2018-01-01 | Stop reason: HOSPADM

## 2018-01-01 RX ORDER — PROPOFOL 10 MG/ML
VIAL (ML) INTRAVENOUS AS NEEDED
Status: DISCONTINUED | OUTPATIENT
Start: 2018-01-01 | End: 2018-01-01 | Stop reason: SURG

## 2018-01-01 RX ORDER — LIDOCAINE HYDROCHLORIDE 20 MG/ML
INJECTION, SOLUTION INFILTRATION; PERINEURAL AS NEEDED
Status: DISCONTINUED | OUTPATIENT
Start: 2018-01-01 | End: 2018-01-01 | Stop reason: SURG

## 2018-01-01 RX ORDER — SODIUM CHLORIDE 0.9 % (FLUSH) 0.9 %
1-10 SYRINGE (ML) INJECTION AS NEEDED
Status: DISCONTINUED | OUTPATIENT
Start: 2018-01-01 | End: 2018-01-01 | Stop reason: HOSPADM

## 2018-01-01 RX ORDER — LIDOCAINE HYDROCHLORIDE 10 MG/ML
0.5 INJECTION, SOLUTION EPIDURAL; INFILTRATION; INTRACAUDAL; PERINEURAL ONCE AS NEEDED
Status: DISCONTINUED | OUTPATIENT
Start: 2018-01-01 | End: 2018-01-01 | Stop reason: HOSPADM

## 2018-01-01 RX ORDER — PROMETHAZINE HYDROCHLORIDE 25 MG/1
25 SUPPOSITORY RECTAL ONCE AS NEEDED
Status: DISCONTINUED | OUTPATIENT
Start: 2018-01-01 | End: 2018-01-01 | Stop reason: HOSPADM

## 2018-01-01 RX ORDER — POTASSIUM CHLORIDE 7.45 MG/ML
10 INJECTION INTRAVENOUS
Status: DISCONTINUED | OUTPATIENT
Start: 2018-01-01 | End: 2018-01-01 | Stop reason: HOSPADM

## 2018-01-01 RX ORDER — NICOTINE POLACRILEX 4 MG
15 LOZENGE BUCCAL
Status: DISCONTINUED | OUTPATIENT
Start: 2018-01-01 | End: 2018-01-01 | Stop reason: HOSPADM

## 2018-01-01 RX ORDER — LEVOTHYROXINE SODIUM 0.07 MG/1
75 TABLET ORAL DAILY
Status: DISCONTINUED | OUTPATIENT
Start: 2018-01-01 | End: 2018-01-01 | Stop reason: HOSPADM

## 2018-01-01 RX ORDER — FLUMAZENIL 0.1 MG/ML
0.2 INJECTION INTRAVENOUS AS NEEDED
Status: DISCONTINUED | OUTPATIENT
Start: 2018-01-01 | End: 2018-01-01 | Stop reason: HOSPADM

## 2018-01-01 RX ORDER — SODIUM CHLORIDE 0.9 % (FLUSH) 0.9 %
3 SYRINGE (ML) INJECTION EVERY 12 HOURS SCHEDULED
Status: DISCONTINUED | OUTPATIENT
Start: 2018-01-01 | End: 2018-01-01 | Stop reason: HOSPADM

## 2018-01-01 RX ORDER — POTASSIUM CHLORIDE 750 MG/1
40 CAPSULE, EXTENDED RELEASE ORAL AS NEEDED
Status: DISCONTINUED | OUTPATIENT
Start: 2018-01-01 | End: 2018-01-01 | Stop reason: HOSPADM

## 2018-01-01 RX ORDER — ONDANSETRON 2 MG/ML
4 INJECTION INTRAMUSCULAR; INTRAVENOUS ONCE AS NEEDED
Status: DISCONTINUED | OUTPATIENT
Start: 2018-01-01 | End: 2018-01-01 | Stop reason: HOSPADM

## 2018-01-01 RX ORDER — DONEPEZIL HYDROCHLORIDE 10 MG/1
10 TABLET, FILM COATED ORAL NIGHTLY
Status: DISCONTINUED | OUTPATIENT
Start: 2018-01-01 | End: 2018-01-01 | Stop reason: HOSPADM

## 2018-01-01 RX ORDER — DOCUSATE SODIUM 100 MG/1
100 CAPSULE, LIQUID FILLED ORAL 2 TIMES DAILY
Status: DISCONTINUED | OUTPATIENT
Start: 2018-01-01 | End: 2018-01-01 | Stop reason: HOSPADM

## 2018-01-01 RX ORDER — HYDROCODONE BITARTRATE AND ACETAMINOPHEN 5; 325 MG/1; MG/1
1 TABLET ORAL EVERY 4 HOURS PRN
Status: DISCONTINUED | OUTPATIENT
Start: 2018-01-01 | End: 2018-01-01 | Stop reason: HOSPADM

## 2018-01-01 RX ORDER — SODIUM CHLORIDE 9 MG/ML
125 INJECTION, SOLUTION INTRAVENOUS CONTINUOUS
Status: DISCONTINUED | OUTPATIENT
Start: 2018-01-01 | End: 2018-01-01

## 2018-01-01 RX ORDER — UREA 10 %
1 LOTION (ML) TOPICAL NIGHTLY PRN
Status: DISCONTINUED | OUTPATIENT
Start: 2018-01-01 | End: 2018-01-01 | Stop reason: HOSPADM

## 2018-01-01 RX ORDER — PROMETHAZINE HYDROCHLORIDE 25 MG/1
25 TABLET ORAL ONCE AS NEEDED
Status: DISCONTINUED | OUTPATIENT
Start: 2018-01-01 | End: 2018-01-01 | Stop reason: HOSPADM

## 2018-01-01 RX ORDER — SODIUM CHLORIDE, SODIUM LACTATE, POTASSIUM CHLORIDE, CALCIUM CHLORIDE 600; 310; 30; 20 MG/100ML; MG/100ML; MG/100ML; MG/100ML
75 INJECTION, SOLUTION INTRAVENOUS CONTINUOUS
Status: DISCONTINUED | OUTPATIENT
Start: 2018-01-01 | End: 2018-01-01

## 2018-01-01 RX ORDER — ONDANSETRON 2 MG/ML
INJECTION INTRAMUSCULAR; INTRAVENOUS AS NEEDED
Status: DISCONTINUED | OUTPATIENT
Start: 2018-01-01 | End: 2018-01-01 | Stop reason: SURG

## 2018-01-01 RX ORDER — FENTANYL CITRATE 50 UG/ML
50 INJECTION, SOLUTION INTRAMUSCULAR; INTRAVENOUS
Status: DISCONTINUED | OUTPATIENT
Start: 2018-01-01 | End: 2018-01-01 | Stop reason: HOSPADM

## 2018-01-01 RX ORDER — ACETAMINOPHEN 325 MG/1
650 TABLET ORAL EVERY 4 HOURS PRN
Start: 2018-01-01

## 2018-01-01 RX ORDER — MEGESTROL ACETATE 40 MG/1
40 TABLET ORAL 2 TIMES DAILY
Start: 2018-01-01

## 2018-01-01 RX ORDER — EPHEDRINE SULFATE 50 MG/ML
INJECTION, SOLUTION INTRAVENOUS AS NEEDED
Status: DISCONTINUED | OUTPATIENT
Start: 2018-01-01 | End: 2018-01-01 | Stop reason: SURG

## 2018-01-01 RX ORDER — HYDROCODONE BITARTRATE AND ACETAMINOPHEN 5; 325 MG/1; MG/1
2 TABLET ORAL EVERY 4 HOURS PRN
Status: DISCONTINUED | OUTPATIENT
Start: 2018-01-01 | End: 2018-01-01 | Stop reason: HOSPADM

## 2018-01-01 RX ORDER — NALOXONE HCL 0.4 MG/ML
0.4 VIAL (ML) INJECTION
Status: DISCONTINUED | OUTPATIENT
Start: 2018-01-01 | End: 2018-01-01 | Stop reason: HOSPADM

## 2018-01-01 RX ORDER — ACETAMINOPHEN 325 MG/1
650 TABLET ORAL EVERY 4 HOURS PRN
Status: DISCONTINUED | OUTPATIENT
Start: 2018-01-01 | End: 2018-01-01 | Stop reason: HOSPADM

## 2018-01-01 RX ORDER — ASPIRIN 325 MG
325 TABLET, DELAYED RELEASE (ENTERIC COATED) ORAL DAILY
Status: DISCONTINUED | OUTPATIENT
Start: 2018-01-01 | End: 2018-01-01 | Stop reason: CLARIF

## 2018-01-01 RX ORDER — DEXTROSE MONOHYDRATE 25 G/50ML
25 INJECTION, SOLUTION INTRAVENOUS
Status: DISCONTINUED | OUTPATIENT
Start: 2018-01-01 | End: 2018-01-01 | Stop reason: HOSPADM

## 2018-01-01 RX ORDER — FENTANYL CITRATE 50 UG/ML
INJECTION, SOLUTION INTRAMUSCULAR; INTRAVENOUS AS NEEDED
Status: DISCONTINUED | OUTPATIENT
Start: 2018-01-01 | End: 2018-01-01 | Stop reason: SURG

## 2018-01-01 RX ORDER — MORPHINE SULFATE 2 MG/ML
2 INJECTION, SOLUTION INTRAMUSCULAR; INTRAVENOUS EVERY 4 HOURS PRN
Status: DISCONTINUED | OUTPATIENT
Start: 2018-01-01 | End: 2018-01-01 | Stop reason: HOSPADM

## 2018-01-01 RX ORDER — SODIUM CHLORIDE 0.9 % (FLUSH) 0.9 %
3-10 SYRINGE (ML) INJECTION AS NEEDED
Status: DISCONTINUED | OUTPATIENT
Start: 2018-01-01 | End: 2018-01-01 | Stop reason: HOSPADM

## 2018-01-01 RX ORDER — ROCURONIUM BROMIDE 10 MG/ML
INJECTION, SOLUTION INTRAVENOUS AS NEEDED
Status: DISCONTINUED | OUTPATIENT
Start: 2018-01-01 | End: 2018-01-01 | Stop reason: SURG

## 2018-01-01 RX ORDER — CEFAZOLIN SODIUM 2 G/100ML
2 INJECTION, SOLUTION INTRAVENOUS ONCE
Status: COMPLETED | OUTPATIENT
Start: 2018-01-01 | End: 2018-01-01

## 2018-01-01 RX ORDER — BISACODYL 5 MG/1
5 TABLET, DELAYED RELEASE ORAL DAILY PRN
Status: DISCONTINUED | OUTPATIENT
Start: 2018-01-01 | End: 2018-01-01 | Stop reason: HOSPADM

## 2018-01-01 RX ORDER — MEGESTROL ACETATE 40 MG/1
40 TABLET ORAL 2 TIMES DAILY
Status: DISCONTINUED | OUTPATIENT
Start: 2018-01-01 | End: 2018-01-01 | Stop reason: HOSPADM

## 2018-01-01 RX ORDER — POTASSIUM CHLORIDE 1.5 G/1.77G
40 POWDER, FOR SOLUTION ORAL AS NEEDED
Status: DISCONTINUED | OUTPATIENT
Start: 2018-01-01 | End: 2018-01-01 | Stop reason: HOSPADM

## 2018-01-01 RX ORDER — SODIUM CHLORIDE 0.9 % (FLUSH) 0.9 %
10 SYRINGE (ML) INJECTION EVERY 12 HOURS SCHEDULED
Status: DISCONTINUED | OUTPATIENT
Start: 2018-01-01 | End: 2018-01-01 | Stop reason: HOSPADM

## 2018-01-01 RX ORDER — ATORVASTATIN CALCIUM 10 MG/1
10 TABLET, FILM COATED ORAL DAILY
Status: DISCONTINUED | OUTPATIENT
Start: 2018-01-01 | End: 2018-01-01 | Stop reason: HOSPADM

## 2018-01-01 RX ORDER — EPHEDRINE SULFATE 50 MG/ML
5 INJECTION, SOLUTION INTRAVENOUS ONCE AS NEEDED
Status: DISCONTINUED | OUTPATIENT
Start: 2018-01-01 | End: 2018-01-01 | Stop reason: HOSPADM

## 2018-01-01 RX ORDER — NALOXONE HCL 0.4 MG/ML
0.2 VIAL (ML) INJECTION AS NEEDED
Status: DISCONTINUED | OUTPATIENT
Start: 2018-01-01 | End: 2018-01-01 | Stop reason: HOSPADM

## 2018-01-01 RX ORDER — DIPHENHYDRAMINE HCL 25 MG
25 CAPSULE ORAL
Status: DISCONTINUED | OUTPATIENT
Start: 2018-01-01 | End: 2018-01-01 | Stop reason: HOSPADM

## 2018-01-01 RX ORDER — BISACODYL 5 MG/1
5 TABLET, DELAYED RELEASE ORAL DAILY PRN
Start: 2018-01-01

## 2018-01-01 RX ORDER — ACETAMINOPHEN 325 MG/1
325 TABLET ORAL EVERY 4 HOURS PRN
Status: DISCONTINUED | OUTPATIENT
Start: 2018-01-01 | End: 2018-01-01 | Stop reason: HOSPADM

## 2018-01-01 RX ORDER — SODIUM CHLORIDE 0.9 % (FLUSH) 0.9 %
3 SYRINGE (ML) INJECTION EVERY 12 HOURS SCHEDULED
Status: DISCONTINUED | OUTPATIENT
Start: 2018-01-01 | End: 2018-01-01

## 2018-01-01 RX ORDER — HYDROMORPHONE HYDROCHLORIDE 1 MG/ML
0.5 INJECTION, SOLUTION INTRAMUSCULAR; INTRAVENOUS; SUBCUTANEOUS
Status: DISCONTINUED | OUTPATIENT
Start: 2018-01-01 | End: 2018-01-01 | Stop reason: HOSPADM

## 2018-01-01 RX ADMIN — MORPHINE SULFATE 2 MG: 10 INJECTION INTRAVENOUS at 20:17

## 2018-01-01 RX ADMIN — SODIUM CHLORIDE, PRESERVATIVE FREE 3 ML: 5 INJECTION INTRAVENOUS at 21:25

## 2018-01-01 RX ADMIN — ATORVASTATIN CALCIUM 10 MG: 10 TABLET, FILM COATED ORAL at 09:34

## 2018-01-01 RX ADMIN — DONEPEZIL HYDROCHLORIDE 10 MG: 10 TABLET, FILM COATED ORAL at 21:58

## 2018-01-01 RX ADMIN — SODIUM CHLORIDE, POTASSIUM CHLORIDE, SODIUM LACTATE AND CALCIUM CHLORIDE 75 ML/HR: 600; 310; 30; 20 INJECTION, SOLUTION INTRAVENOUS at 23:05

## 2018-01-01 RX ADMIN — DOCUSATE SODIUM 100 MG: 100 CAPSULE, LIQUID FILLED ORAL at 08:32

## 2018-01-01 RX ADMIN — FENTANYL CITRATE 25 MCG: 50 INJECTION INTRAMUSCULAR; INTRAVENOUS at 16:05

## 2018-01-01 RX ADMIN — SODIUM CHLORIDE, PRESERVATIVE FREE 3 ML: 5 INJECTION INTRAVENOUS at 09:00

## 2018-01-01 RX ADMIN — MEGESTROL ACETATE 40 MG: 40 TABLET ORAL at 08:42

## 2018-01-01 RX ADMIN — DOCUSATE SODIUM 100 MG: 100 CAPSULE, LIQUID FILLED ORAL at 09:09

## 2018-01-01 RX ADMIN — METFORMIN HYDROCHLORIDE 500 MG: 500 TABLET ORAL at 08:16

## 2018-01-01 RX ADMIN — DOCUSATE SODIUM 100 MG: 100 CAPSULE, LIQUID FILLED ORAL at 21:58

## 2018-01-01 RX ADMIN — SODIUM CHLORIDE, PRESERVATIVE FREE 10 ML: 5 INJECTION INTRAVENOUS at 10:55

## 2018-01-01 RX ADMIN — PANTOPRAZOLE SODIUM 40 MG: 40 TABLET, DELAYED RELEASE ORAL at 12:06

## 2018-01-01 RX ADMIN — SODIUM CHLORIDE, PRESERVATIVE FREE 3 ML: 5 INJECTION INTRAVENOUS at 10:55

## 2018-01-01 RX ADMIN — MEGESTROL ACETATE 40 MG: 40 TABLET ORAL at 21:54

## 2018-01-01 RX ADMIN — DONEPEZIL HYDROCHLORIDE 10 MG: 10 TABLET, FILM COATED ORAL at 21:55

## 2018-01-01 RX ADMIN — MORPHINE SULFATE 2 MG: 10 INJECTION INTRAVENOUS at 20:31

## 2018-01-01 RX ADMIN — SODIUM CHLORIDE, PRESERVATIVE FREE 3 ML: 5 INJECTION INTRAVENOUS at 08:33

## 2018-01-01 RX ADMIN — PROPOFOL 80 MG: 10 INJECTION, EMULSION INTRAVENOUS at 15:28

## 2018-01-01 RX ADMIN — SODIUM CHLORIDE, PRESERVATIVE FREE 3 ML: 5 INJECTION INTRAVENOUS at 21:13

## 2018-01-01 RX ADMIN — ATORVASTATIN CALCIUM 10 MG: 10 TABLET, FILM COATED ORAL at 08:16

## 2018-01-01 RX ADMIN — DOCUSATE SODIUM 100 MG: 100 CAPSULE, LIQUID FILLED ORAL at 21:25

## 2018-01-01 RX ADMIN — LEVOTHYROXINE SODIUM 75 MCG: 75 TABLET ORAL at 06:23

## 2018-01-01 RX ADMIN — METOPROLOL TARTRATE 25 MG: 25 TABLET ORAL at 20:03

## 2018-01-01 RX ADMIN — METFORMIN HYDROCHLORIDE 500 MG: 500 TABLET ORAL at 09:16

## 2018-01-01 RX ADMIN — METFORMIN HYDROCHLORIDE 500 MG: 500 TABLET ORAL at 12:28

## 2018-01-01 RX ADMIN — ASPIRIN 325 MG: 325 TABLET, DELAYED RELEASE ORAL at 09:16

## 2018-01-01 RX ADMIN — SODIUM CHLORIDE, PRESERVATIVE FREE 3 ML: 5 INJECTION INTRAVENOUS at 09:16

## 2018-01-01 RX ADMIN — DONEPEZIL HYDROCHLORIDE 10 MG: 10 TABLET, FILM COATED ORAL at 20:03

## 2018-01-01 RX ADMIN — METOPROLOL TARTRATE 12.5 MG: 25 TABLET ORAL at 20:46

## 2018-01-01 RX ADMIN — LEVOTHYROXINE SODIUM 75 MCG: 75 TABLET ORAL at 06:28

## 2018-01-01 RX ADMIN — METFORMIN HYDROCHLORIDE 500 MG: 500 TABLET ORAL at 08:42

## 2018-01-01 RX ADMIN — FENTANYL CITRATE 25 MCG: 50 INJECTION INTRAMUSCULAR; INTRAVENOUS at 16:42

## 2018-01-01 RX ADMIN — METFORMIN HYDROCHLORIDE 500 MG: 500 TABLET ORAL at 08:32

## 2018-01-01 RX ADMIN — ATORVASTATIN CALCIUM 10 MG: 10 TABLET, FILM COATED ORAL at 09:16

## 2018-01-01 RX ADMIN — SODIUM CHLORIDE, POTASSIUM CHLORIDE, SODIUM LACTATE AND CALCIUM CHLORIDE: 600; 310; 30; 20 INJECTION, SOLUTION INTRAVENOUS at 15:10

## 2018-01-01 RX ADMIN — SODIUM CHLORIDE, PRESERVATIVE FREE 3 ML: 5 INJECTION INTRAVENOUS at 08:17

## 2018-01-01 RX ADMIN — METOPROLOL TARTRATE 25 MG: 25 TABLET ORAL at 08:32

## 2018-01-01 RX ADMIN — METFORMIN HYDROCHLORIDE 500 MG: 500 TABLET ORAL at 17:14

## 2018-01-01 RX ADMIN — EPHEDRINE SULFATE 10 MG: 50 INJECTION INTRAMUSCULAR; INTRAVENOUS; SUBCUTANEOUS at 16:24

## 2018-01-01 RX ADMIN — ONDANSETRON 2 MG: 2 INJECTION INTRAMUSCULAR; INTRAVENOUS at 16:29

## 2018-01-01 RX ADMIN — INSULIN LISPRO 4 UNITS: 100 INJECTION, SOLUTION INTRAVENOUS; SUBCUTANEOUS at 11:34

## 2018-01-01 RX ADMIN — LEVOTHYROXINE SODIUM 75 MCG: 75 TABLET ORAL at 06:24

## 2018-01-01 RX ADMIN — SODIUM CHLORIDE, PRESERVATIVE FREE 3 ML: 5 INJECTION INTRAVENOUS at 21:59

## 2018-01-01 RX ADMIN — ROCURONIUM BROMIDE 25 MG: 10 INJECTION INTRAVENOUS at 15:29

## 2018-01-01 RX ADMIN — METOPROLOL TARTRATE 25 MG: 25 TABLET ORAL at 21:59

## 2018-01-01 RX ADMIN — HYDROCODONE BITARTRATE AND ACETAMINOPHEN 2 TABLET: 5; 325 TABLET ORAL at 16:34

## 2018-01-01 RX ADMIN — LIDOCAINE HYDROCHLORIDE 20 MG: 20 INJECTION, SOLUTION INFILTRATION; PERINEURAL at 15:28

## 2018-01-01 RX ADMIN — METFORMIN HYDROCHLORIDE 500 MG: 500 TABLET ORAL at 21:58

## 2018-01-01 RX ADMIN — METOPROLOL TARTRATE 25 MG: 25 TABLET ORAL at 21:55

## 2018-01-01 RX ADMIN — ATORVASTATIN CALCIUM 10 MG: 10 TABLET, FILM COATED ORAL at 08:13

## 2018-01-01 RX ADMIN — METOPROLOL TARTRATE 25 MG: 25 TABLET ORAL at 21:09

## 2018-01-01 RX ADMIN — SODIUM CHLORIDE, PRESERVATIVE FREE 3 ML: 5 INJECTION INTRAVENOUS at 21:58

## 2018-01-01 RX ADMIN — SODIUM CHLORIDE, PRESERVATIVE FREE 3 ML: 5 INJECTION INTRAVENOUS at 09:10

## 2018-01-01 RX ADMIN — SODIUM CHLORIDE, POTASSIUM CHLORIDE, SODIUM LACTATE AND CALCIUM CHLORIDE 75 ML/HR: 600; 310; 30; 20 INJECTION, SOLUTION INTRAVENOUS at 15:48

## 2018-01-01 RX ADMIN — INSULIN LISPRO 4 UNITS: 100 INJECTION, SOLUTION INTRAVENOUS; SUBCUTANEOUS at 18:05

## 2018-01-01 RX ADMIN — DOCUSATE SODIUM 100 MG: 100 CAPSULE, LIQUID FILLED ORAL at 21:55

## 2018-01-01 RX ADMIN — DEXAMETHASONE SODIUM PHOSPHATE 4 MG: 10 INJECTION INTRAMUSCULAR; INTRAVENOUS at 16:29

## 2018-01-01 RX ADMIN — CEFAZOLIN SODIUM 2 G: 2 INJECTION, SOLUTION INTRAVENOUS at 23:05

## 2018-01-01 RX ADMIN — METOPROLOL TARTRATE 25 MG: 25 TABLET ORAL at 09:16

## 2018-01-01 RX ADMIN — SODIUM CHLORIDE, PRESERVATIVE FREE 3 ML: 5 INJECTION INTRAVENOUS at 21:55

## 2018-01-01 RX ADMIN — FAMOTIDINE 20 MG: 10 INJECTION, SOLUTION INTRAVENOUS at 14:38

## 2018-01-01 RX ADMIN — INFLUENZA A VIRUS A/SINGAPORE/GP1908/2015 IVR-180 (H1N1) ANTIGEN (MDCK CELL DERIVED, PROPIOLACTONE INACTIVATED), INFLUENZA A VIRUS A/NORTH CAROLINA/04/2016 (H3N2) HEMAGGLUTININ ANTIGEN (MDCK CELL DERIVED, PROPIOLACTONE INACTIVATED), INFLUENZA B VIRUS B/IOWA/06/2017 HEMAGGLUTININ ANTIGEN (MDCK CELL DERIVED, PROPIOLACTONE INACTIVATED), INFLUENZA B VIRUS B/SINGAPORE/INFTT-16-0610/2016 HEMAGGLUTININ ANTIGEN (MDCK CELL DERIVED, PROPIOLACTONE INACTIVATED) 0.5 ML: 15; 15; 15; 15 INJECTION, SUSPENSION INTRAMUSCULAR at 12:37

## 2018-01-01 RX ADMIN — SODIUM CHLORIDE 125 ML/HR: 9 INJECTION, SOLUTION INTRAVENOUS at 18:20

## 2018-01-01 RX ADMIN — INSULIN LISPRO 2 UNITS: 100 INJECTION, SOLUTION INTRAVENOUS; SUBCUTANEOUS at 10:22

## 2018-01-01 RX ADMIN — HYDROCODONE BITARTRATE AND ACETAMINOPHEN 1 TABLET: 5; 325 TABLET ORAL at 20:11

## 2018-01-01 RX ADMIN — DONEPEZIL HYDROCHLORIDE 10 MG: 10 TABLET, FILM COATED ORAL at 21:24

## 2018-01-01 RX ADMIN — METFORMIN HYDROCHLORIDE 500 MG: 500 TABLET ORAL at 17:36

## 2018-01-01 RX ADMIN — ASPIRIN 325 MG: 325 TABLET, DELAYED RELEASE ORAL at 08:32

## 2018-01-01 RX ADMIN — METOPROLOL TARTRATE 25 MG: 25 TABLET ORAL at 08:42

## 2018-01-01 RX ADMIN — METOPROLOL TARTRATE 25 MG: 25 TABLET ORAL at 20:11

## 2018-01-01 RX ADMIN — FENTANYL CITRATE 25 MCG: 50 INJECTION INTRAMUSCULAR; INTRAVENOUS at 16:00

## 2018-01-01 RX ADMIN — SODIUM CHLORIDE, PRESERVATIVE FREE 3 ML: 5 INJECTION INTRAVENOUS at 08:43

## 2018-01-01 RX ADMIN — SODIUM CHLORIDE, PRESERVATIVE FREE 3 ML: 5 INJECTION INTRAVENOUS at 21:12

## 2018-01-01 RX ADMIN — DONEPEZIL HYDROCHLORIDE 10 MG: 10 TABLET, FILM COATED ORAL at 20:11

## 2018-01-01 RX ADMIN — PANTOPRAZOLE SODIUM 40 MG: 40 TABLET, DELAYED RELEASE ORAL at 08:13

## 2018-01-01 RX ADMIN — INSULIN LISPRO 2 UNITS: 100 INJECTION, SOLUTION INTRAVENOUS; SUBCUTANEOUS at 12:03

## 2018-01-01 RX ADMIN — PHENYLEPHRINE HYDROCHLORIDE 100 MCG: 10 INJECTION INTRAVENOUS at 15:56

## 2018-01-01 RX ADMIN — SODIUM CHLORIDE, PRESERVATIVE FREE 3 ML: 5 INJECTION INTRAVENOUS at 20:06

## 2018-01-01 RX ADMIN — FENTANYL CITRATE 25 MCG: 50 INJECTION INTRAMUSCULAR; INTRAVENOUS at 15:27

## 2018-01-01 RX ADMIN — PHENYLEPHRINE HYDROCHLORIDE 100 MCG: 10 INJECTION INTRAVENOUS at 16:14

## 2018-01-01 RX ADMIN — INSULIN LISPRO 2 UNITS: 100 INJECTION, SOLUTION INTRAVENOUS; SUBCUTANEOUS at 21:57

## 2018-01-01 RX ADMIN — CEFAZOLIN SODIUM 2 G: 2 INJECTION, SOLUTION INTRAVENOUS at 06:00

## 2018-01-01 RX ADMIN — LEVOTHYROXINE SODIUM 75 MCG: 75 TABLET ORAL at 09:34

## 2018-01-01 RX ADMIN — DONEPEZIL HYDROCHLORIDE 10 MG: 10 TABLET, FILM COATED ORAL at 21:10

## 2018-01-01 RX ADMIN — POTASSIUM CHLORIDE 40 MEQ: 750 CAPSULE, EXTENDED RELEASE ORAL at 09:09

## 2018-01-01 RX ADMIN — INSULIN LISPRO 6 UNITS: 100 INJECTION, SOLUTION INTRAVENOUS; SUBCUTANEOUS at 12:37

## 2018-01-01 RX ADMIN — METFORMIN HYDROCHLORIDE 500 MG: 500 TABLET ORAL at 21:18

## 2018-01-01 RX ADMIN — INSULIN LISPRO 2 UNITS: 100 INJECTION, SOLUTION INTRAVENOUS; SUBCUTANEOUS at 21:16

## 2018-01-01 RX ADMIN — SODIUM CHLORIDE, PRESERVATIVE FREE 3 ML: 5 INJECTION INTRAVENOUS at 08:15

## 2018-01-01 RX ADMIN — CEFAZOLIN SODIUM 2 G: 2 INJECTION, SOLUTION INTRAVENOUS at 15:27

## 2018-01-01 RX ADMIN — SODIUM CHLORIDE, PRESERVATIVE FREE 3 ML: 5 INJECTION INTRAVENOUS at 10:23

## 2018-01-01 RX ADMIN — METOPROLOL TARTRATE 25 MG: 25 TABLET ORAL at 08:16

## 2018-01-01 RX ADMIN — PANTOPRAZOLE SODIUM 40 MG: 40 TABLET, DELAYED RELEASE ORAL at 06:23

## 2018-01-01 RX ADMIN — LEVOTHYROXINE SODIUM 75 MCG: 75 TABLET ORAL at 08:13

## 2018-01-01 RX ADMIN — METOPROLOL TARTRATE 25 MG: 25 TABLET ORAL at 21:24

## 2018-01-01 RX ADMIN — ASPIRIN 325 MG: 325 TABLET, DELAYED RELEASE ORAL at 09:09

## 2018-01-01 RX ADMIN — DOCUSATE SODIUM 100 MG: 100 CAPSULE, LIQUID FILLED ORAL at 08:16

## 2018-01-01 RX ADMIN — DONEPEZIL HYDROCHLORIDE 10 MG: 10 TABLET, FILM COATED ORAL at 21:57

## 2018-01-01 RX ADMIN — ATORVASTATIN CALCIUM 10 MG: 10 TABLET, FILM COATED ORAL at 20:03

## 2018-01-01 RX ADMIN — METFORMIN HYDROCHLORIDE 500 MG: 500 TABLET ORAL at 09:09

## 2018-01-01 RX ADMIN — SODIUM CHLORIDE 125 ML/HR: 9 INJECTION, SOLUTION INTRAVENOUS at 12:05

## 2018-01-01 RX ADMIN — METOPROLOL TARTRATE 25 MG: 25 TABLET ORAL at 09:09

## 2018-01-01 RX ADMIN — SODIUM CHLORIDE, PRESERVATIVE FREE 10 ML: 5 INJECTION INTRAVENOUS at 08:14

## 2018-01-01 RX ADMIN — INSULIN LISPRO 2 UNITS: 100 INJECTION, SOLUTION INTRAVENOUS; SUBCUTANEOUS at 17:35

## 2018-01-01 RX ADMIN — SODIUM CHLORIDE 125 ML/HR: 9 INJECTION, SOLUTION INTRAVENOUS at 20:46

## 2018-01-01 RX ADMIN — METOPROLOL TARTRATE 25 MG: 25 TABLET ORAL at 21:58

## 2018-01-01 RX ADMIN — SODIUM CHLORIDE, PRESERVATIVE FREE 10 ML: 5 INJECTION INTRAVENOUS at 21:28

## 2018-01-01 RX ADMIN — PANTOPRAZOLE SODIUM 40 MG: 40 TABLET, DELAYED RELEASE ORAL at 06:24

## 2018-01-01 RX ADMIN — PROPOFOL 20 MG: 10 INJECTION, EMULSION INTRAVENOUS at 16:01

## 2018-01-01 RX ADMIN — BISACODYL 5 MG: 5 TABLET, COATED ORAL at 09:16

## 2018-01-01 RX ADMIN — MEGESTROL ACETATE 40 MG: 40 TABLET ORAL at 09:34

## 2018-01-01 RX ADMIN — PANTOPRAZOLE SODIUM 40 MG: 40 TABLET, DELAYED RELEASE ORAL at 06:28

## 2018-01-01 RX ADMIN — DOCUSATE SODIUM 100 MG: 100 CAPSULE, LIQUID FILLED ORAL at 09:34

## 2018-01-01 RX ADMIN — METFORMIN HYDROCHLORIDE 500 MG: 500 TABLET ORAL at 12:03

## 2018-01-01 RX ADMIN — PROPOFOL 20 MG: 10 INJECTION, EMULSION INTRAVENOUS at 16:05

## 2018-01-01 RX ADMIN — ATORVASTATIN CALCIUM 10 MG: 10 TABLET, FILM COATED ORAL at 08:32

## 2018-01-01 RX ADMIN — SODIUM CHLORIDE, POTASSIUM CHLORIDE, SODIUM LACTATE AND CALCIUM CHLORIDE 9 ML/HR: 600; 310; 30; 20 INJECTION, SOLUTION INTRAVENOUS at 14:34

## 2018-01-01 RX ADMIN — METOPROLOL TARTRATE 12.5 MG: 25 TABLET ORAL at 10:22

## 2018-01-01 RX ADMIN — PHENYLEPHRINE HYDROCHLORIDE 100 MCG: 10 INJECTION INTRAVENOUS at 15:48

## 2018-01-01 RX ADMIN — LEVOTHYROXINE SODIUM 75 MCG: 75 TABLET ORAL at 08:16

## 2018-01-01 RX ADMIN — ACETAMINOPHEN 650 MG: 325 TABLET, FILM COATED ORAL at 06:23

## 2018-01-01 RX ADMIN — LEVOTHYROXINE SODIUM 75 MCG: 75 TABLET ORAL at 08:32

## 2018-01-01 RX ADMIN — INSULIN LISPRO 4 UNITS: 100 INJECTION, SOLUTION INTRAVENOUS; SUBCUTANEOUS at 08:17

## 2018-01-01 RX ADMIN — DOCUSATE SODIUM 100 MG: 100 CAPSULE, LIQUID FILLED ORAL at 09:16

## 2018-01-01 RX ADMIN — PROPOFOL 20 MG: 10 INJECTION, EMULSION INTRAVENOUS at 16:03

## 2018-01-01 RX ADMIN — HYDROCODONE BITARTRATE AND ACETAMINOPHEN 1 TABLET: 5; 325 TABLET ORAL at 21:24

## 2018-01-01 RX ADMIN — MEGESTROL ACETATE 40 MG: 40 TABLET ORAL at 20:11

## 2018-01-01 RX ADMIN — ATORVASTATIN CALCIUM 10 MG: 10 TABLET, FILM COATED ORAL at 08:43

## 2018-01-01 RX ADMIN — METOPROLOL TARTRATE 25 MG: 25 TABLET ORAL at 09:34

## 2018-01-01 RX ADMIN — PHENYLEPHRINE HYDROCHLORIDE 100 MCG: 10 INJECTION INTRAVENOUS at 16:18

## 2018-01-01 RX ADMIN — ATORVASTATIN CALCIUM 10 MG: 10 TABLET, FILM COATED ORAL at 09:09

## 2018-01-01 RX ADMIN — MEGESTROL ACETATE 40 MG: 40 TABLET ORAL at 08:13

## 2018-01-01 RX ADMIN — METOPROLOL TARTRATE 25 MG: 25 TABLET ORAL at 08:13

## 2018-01-01 RX ADMIN — ASPIRIN 325 MG: 325 TABLET, DELAYED RELEASE ORAL at 08:17

## 2018-01-01 RX ADMIN — INSULIN LISPRO 2 UNITS: 100 INJECTION, SOLUTION INTRAVENOUS; SUBCUTANEOUS at 08:32

## 2018-01-01 RX ADMIN — POTASSIUM CHLORIDE 40 MEQ: 750 CAPSULE, EXTENDED RELEASE ORAL at 12:28

## 2018-01-01 RX ADMIN — MEGESTROL ACETATE 40 MG: 40 TABLET ORAL at 21:25

## 2018-01-01 RX ADMIN — PHENYLEPHRINE HYDROCHLORIDE 100 MCG: 10 INJECTION INTRAVENOUS at 16:12

## 2018-12-09 PROBLEM — S72.142A CLOSED 2-PART INTERTROCHANTERIC FRACTURE OF LEFT FEMUR (HCC): Status: ACTIVE | Noted: 2018-01-01
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
## 2018-12-09 NOTE — ED PROVIDER NOTES
EMERGENCY DEPARTMENT ENCOUNTER    CHIEF COMPLAINT  Chief Complaint: Pain post fall  History given by: Pt's family  History limited by: Dementia  Room Number: 03/03  PMD: Sean Mayer MD      HPI:  Pt is a 89 y.o. female, Hx of Dementia, who presents by EMS with report of L hip and knee pain, worse with movement, s/p unwitnessed fall. Pt ambulates with a cane. Per family pt was last known normal at 0500 and found down in her room at 1300. Per family, they were in the house but didn't hear any falls. Per family, pt has baseline confusion that is unchanged today. Per family, pt hasn't had changes in appetite, cold, fever, urinary or bowel changes, CP, SOA, or N/V/D. Per family, pt had a mild cough that was noticed today. Per family, pt has been refusing her medications for 6 months and therefore has had none. Pt's family states they don't want to force her to take her medications and are concerned for quality of life at this point for the pt. Per family, Pt has Hx of hip replacement.     Duration:  Last normal at 0500, found down at 1300  Onset: sudden  Timing: constant  Location: L hip and knee  Radiation: n/a  Quality: unknown  Intensity/Severity: moderate  Progression: unchanged  Associated Symptoms: L hip and knee pain  Aggravating Factors: movement  Alleviating Factors: rest  Previous Episodes: Pt has Hx of R hip replacement.  Treatment before arrival: none    PAST MEDICAL HISTORY  Active Ambulatory Problems     Diagnosis Date Noted   • Anemia, pernicious 10/12/2014   • 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   • Dementia 02/19/2014   • Type II diabetes mellitus (CMS/[facility])    • Hyperlipidemia    • Benign essential hypertension 10/12/2014   • Hypothyroidism    • Vitamin D deficiency 07/26/2011   • Closed fracture of right hip with routine healing 11/04/2015   • Slow transit constipation 11/04/2015     Resolved Ambulatory Problems     Diagnosis Date Noted   • No Resolved Ambulatory Problems     Past Medical  History:   Diagnosis Date   • Anemia, pernicious 10/12/2014   • Benign essential hypertension 10/12/2014   • 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   • Dementia 02/19/2014   • Hyperlipidemia    • Hypothyroidism    • Type II diabetes mellitus (CMS/[facility])    • Vitamin D deficiency 07/26/2011       PAST SURGICAL HISTORY  History reviewed. No pertinent surgical history.    FAMILY HISTORY  Family History   Problem Relation Age of Onset   • Hypertension Other        SOCIAL HISTORY  Social History     Socioeconomic History   • Marital status: Single     Spouse name: Not on file   • Number of children: Not on file   • Years of education: Not on file   • Highest education level: Not on file   Social Needs   • Financial resource strain: Not on file   • Food insecurity - worry: Not on file   • Food insecurity - inability: Not on file   • Transportation needs - medical: Not on file   • Transportation needs - non-medical: Not on file   Occupational History   • Not on file   Tobacco Use   • Smoking status: Never Smoker   Substance and Sexual Activity   • Alcohol use: No   • Drug use: Not on file   • Sexual activity: Not on file   Other Topics Concern   • Not on file   Social History Narrative   • Not on file       ALLERGIES  Patient has no known allergies.    REVIEW OF SYSTEMS  Review of Systems   Unable to perform ROS: Dementia   all done per family    PHYSICAL EXAM  ED Triage Vitals [12/09/18 1631]   Temp Heart Rate Resp BP SpO2   99 °F (37.2 °C) 112 18 180/100 98 %      Temp src Heart Rate Source Patient Position BP Location FiO2 (%)   Tympanic -- -- -- --       Physical Exam   Constitutional:  Non-toxic appearance. She appears distressed (mild).   Awake alert, follow commands, moves all extremities, disoriented to time, situation.  When asked if she has pain she gestures at her right thigh.   HENT:   Head: Normocephalic and atraumatic.   Mouth/Throat: Oropharynx is clear and moist.   Oropharynx within normal limits.    Eyes:  EOM are normal. Pupils are equal, round, and reactive to light.   Neck: Normal range of motion. Neck supple.   Cardiovascular: Normal rate, normal heart sounds and intact distal pulses. An irregular rhythm present.   No murmur heard.  Pulses:       Posterior tibial pulses are 2+ on the right side, and 2+ on the left side.   Pulmonary/Chest: Effort normal and breath sounds normal. No respiratory distress. She exhibits no tenderness.   Abdominal: Soft. Bowel sounds are normal. There is no tenderness. There is no rebound and no guarding.   Musculoskeletal: Normal range of motion. She exhibits no edema.        Cervical back: She exhibits no tenderness.   Pain with ROM of L hip mild, no significant deformity   Neurological: She is alert.   Skin: Skin is warm and dry.   Psychiatric: Affect normal.   Nursing note and vitals reviewed.      LAB RESULTS  Lab Results (last 24 hours)     Procedure Component Value Units Date/Time    Comprehensive Metabolic Panel [[id number]] Collected:  12/09/18 1931    Specimen:  Blood Updated:  12/1934    Troponin [[id number]] Collected:  12/09/18 1931    Specimen:  Blood Updated:  12/1934    BNP [[id number]] Collected:  12/09/18 1931    Specimen:  Blood Updated:  12/1934    CK [[id number]] Collected:  12/09/18 1931    Specimen:  Blood Updated:  12/1934          I ordered the above labs and reviewed the results    RADIOLOGY  CT Head Without Contrast   Final Result   1. No acute intracranial abnormality.                           This report was finalized on 12/9/2018 7:40 PM by Anthony Ace M.D.          XR Knee 1 or 2 View Left   Preliminary Result   Degenerative disease of the left knee with no acute process   identified.       AP PELVIS AND LEFT HIP       There is an intertrochanteric fracture of the left hip showing mild   displacement. Femoral head articulates with the acetabulum. No other   fractures of the pelvis are seen.       Right hip prosthesis is seen in good  position.       IMPRESSION:  Intertrochanteric left hip fracture.       AP CHEST       Heart size is within normal limits. Lungs are underinflated with some   elevation of the right hemidiaphragm. No focal infiltrates are seen.   There is vascular crowding. Prominent soft tissue is seen in the medial   aspect of the right apex, likely related to ectatic vascular structures   and similar to the 09/05/2015 study.       IMPRESSION:  No acute process.          XR Chest 1 View   Preliminary Result   Degenerative disease of the left knee with no acute process   identified.       AP PELVIS AND LEFT HIP       There is an intertrochanteric fracture of the left hip showing mild   displacement. Femoral head articulates with the acetabulum. No other   fractures of the pelvis are seen.       Right hip prosthesis is seen in good position.       IMPRESSION:  Intertrochanteric left hip fracture.       AP CHEST       Heart size is within normal limits. Lungs are underinflated with some   elevation of the right hemidiaphragm. No focal infiltrates are seen.   There is vascular crowding. Prominent soft tissue is seen in the medial   aspect of the right apex, likely related to ectatic vascular structures   and similar to the 09/05/2015 study.       IMPRESSION:  No acute process.          XR Hip With or Without Pelvis 2 - 3 View Left   Preliminary Result   Degenerative disease of the left knee with no acute process   identified.       AP PELVIS AND LEFT HIP       There is an intertrochanteric fracture of the left hip showing mild   displacement. Femoral head articulates with the acetabulum. No other   fractures of the pelvis are seen.       Right hip prosthesis is seen in good position.       IMPRESSION:  Intertrochanteric left hip fracture.       AP CHEST       Heart size is within normal limits. Lungs are underinflated with some   elevation of the right hemidiaphragm. No focal infiltrates are seen.   There is vascular crowding.  Prominent soft tissue is seen in the medial   aspect of the right apex, likely related to ectatic vascular structures   and similar to the 09/05/2015 study.       IMPRESSION:  No acute process.               I ordered the above noted radiological studies. Interpreted by radiologist. Reviewed by me in PACS.       PROCEDURES  Procedures    EKG          EKG time: 1843  Rhythm/Rate: Tachy occasional PACs, 110s  P waves/FL: 1st degree AV block, Ps normal  QRS, axis: PRWP, Qs inferior leads  ST and T waves: nonspecific ST and T wave findings in lateral leads    Interpreted Contemporaneously by me, independently viewed  minimally changed compared to prior 9/7/15      PROGRESS AND CONSULTS       1715  Blood pressure = 180/130. Discussed plan to further evaluate with lab work and imaging. Pt is agreeable.     1720  Ordered lab work, CXR, CT Head, XR L hip, EKG, and XR L knee.     1952  Ortho consult Dr Johnson, will see in consult    1924  Discussed the pt's case with Dr. Israel, [facility] who agrees to admit the pt. He is aware that pt has labs/urine pending.     1937  Rechecked pt. Pt is resting comfortably. Notified pt of imaging, including XR L hip that shows intertrochanteric fracture of the left hip. Discussed the plan to admit pt for further monitoring and treatment. Pt understands and agrees with the plan, all questions answered.            MEDICAL DECISION MAKING  Results were reviewed/discussed with the patient and they were also made aware of online access. Pt also made aware that some labs, such as cultures, will not be resulted during ER visit and follow up with PMD is necessary.     MDM  Number of Diagnoses or Management Options     Amount and/or Complexity of Data Reviewed  Tests in the radiology section of CPT®: ordered and reviewed (XR L hip shows intertrochanteric fracture of the left hip showing mild displacement)  Tests in the medicine section of CPT®: ordered and reviewed (See EKG results in procedure.  )  Discussion of test results with the performing providers: yes (Dr. Israel, [facility])           DIAGNOSIS  Final diagnoses:   Fall, initial encounter   Closed fracture of left hip, initial encounter (CMS/[facility])   Essential hypertension       DISPOSITION  ADMISSION    Discussed treatment plan and reason for admission with pt/family and admitting physician.  Pt/family voiced understanding of the plan for admission for further testing/treatment as needed.         Latest Documented Vital Signs:  As of 7:44 PM  BP- 180/100 HR- 110 Temp- 99 °F (37.2 °C) (Tympanic) O2 sat- 98%    --  Documentation assistance provided by minnie Wang for Dr. Recio.  Information recorded by the scribe was done at my direction and has been verified and validated by me.          Sean Wang  12/09/18 1944       Desire Recio MD  12/10/18 3134

## 2018-12-10 NOTE — PLAN OF CARE
Problem: Patient Care Overview  Goal: Plan of Care Review  Outcome: Ongoing (interventions implemented as appropriate)   12/10/18 5920   Coping/Psychosocial   Plan of Care Reviewed With patient;son   Plan of Care Review   Progress no change   OTHER   Outcome Summary patient rested through out the day, son at bedside, consent signed and in chart for surgery tomorrow, VSS, room air, no c/o pain, will continue to monitor.

## 2018-12-10 NOTE — PROGRESS NOTES
Continued Stay Note  [facility]     Patient Name: Sienna Ortiz  MRN: [id number]  Today's Date: 12/10/2018    Admit Date: 12/9/2018    Discharge Plan     Row Name 12/10/18 1115       Plan    Plan  Plan to be determined-  awaiting surgery on 12/11.   LILIAN Mott    Plan Comments   FACE SHEET VERIFIED/ IM LETTER SIGNED.  Spoke to pt's son  ( Abiel Ortiz  [phone]) per phone.  Pt's PCP is Dr. Sean Mayer.  Pt lives in a two story house with son ( Abiel Ortiz) and his wife (Lalo) , pt's granddaughter  (Gilda Nair) and her two children.   Pt has a straight cane she uses at home.  Pt gets her prescriptions at [facility]   ([place] [facility]).   Pt's son denies any issues affording medications.  Pt is not current with .  Pt has been in [facility] in the past.  CCP to follow for needs post op.   LILIAN Mott        Discharge Codes    No documentation.             Eileen Crenshaw RN

## 2018-12-10 NOTE — PLAN OF CARE
Problem: Patient Care Overview  Goal: Plan of Care Review  Outcome: Ongoing (interventions implemented as appropriate)   12/10/18 0600   Coping/Psychosocial   Plan of Care Reviewed With patient   Plan of Care Review   Progress no change   OTHER   Outcome Summary pt resting in bed quietly. pt oriented to name only. not able to reorient. redirected as needed. pt denies pain. does not realize hip is broken. kept npo after midnight in case of surgery today. ortho requests cardiac clearance before surgery. will continue to monitor.      Goal: Individualization and Mutuality  Outcome: Ongoing (interventions implemented as appropriate)    Goal: Discharge Needs Assessment  Outcome: Ongoing (interventions implemented as appropriate)      Problem: Fall Risk (Adult)  Goal: Identify Related Risk Factors and Signs and Symptoms  Outcome: Outcome(s) achieved Date Met: 12/10/18    Goal: Absence of Fall  Outcome: Ongoing (interventions implemented as appropriate)      Problem: Skin Injury Risk (Adult)  Goal: Identify Related Risk Factors and Signs and Symptoms  Outcome: Outcome(s) achieved Date Met: 12/10/18    Goal: Skin Health and Integrity  Outcome: Ongoing (interventions implemented as appropriate)

## 2018-12-10 NOTE — PROGRESS NOTES
"DAILY PROGRESS NOTE  [facility]    Patient Identification:  Name: Sienna Ortiz  Age: 89 y.o.  Sex: female  :  3/4/1929  MRN: [id number]         Primary Care Physician: Sean Mayer MD      Subjective  No c/o.     Objective:  General Appearance:  Comfortable, in no acute distress and not in pain (Thin, frail).    Vital signs: (most recent): Blood pressure 116/78, pulse 110, temperature 97.6 °F (36.4 °C), temperature source Oral, resp. rate 16, height 160 cm (63\"), weight 68 kg (150 lb), SpO2 95 %, not currently breastfeeding.    Lungs:  Normal effort and normal respiratory rate.  Breath sounds clear to auscultation.    Heart: Tachycardia.  Regular rhythm.    Neurological: Patient is alert.  (Oriented only to person.  Pleasant and cooperative. ).    Skin:  Warm and dry.                Vital signs in last 24 hours:  Temp:  [97.6 °F (36.4 °C)-99 °F (37.2 °C)] 97.6 °F (36.4 °C)  Heart Rate:  [107-134] 110  Resp:  [16-18] 16  BP: (116-180)/() 116/78    Intake/Output:    Intake/Output Summary (Last 24 hours) at 12/10/2018 1419  Last data filed at 12/10/2018 1200  Gross per 24 hour   Intake 600 ml   Output --   Net 600 ml         Results from last 7 days   Lab Units  12/10/18   0632  18   2019   WBC 10*3/mm3  8.46  12.44*   HEMOGLOBIN g/dL  12.2  14.3   PLATELETS 10*3/mm3  257  288     Results from last 7 days   Lab Units  12/10/18   0632  18   1931   SODIUM mmol/L  141  140   POTASSIUM mmol/L  3.5  4.2   CHLORIDE mmol/L  106  101   CO2 mmol/L  21.8*  21.3*   BUN mg/dL  9  10   CREATININE mg/dL  0.71  0.85   GLUCOSE mg/dL  182*  218*   Estimated Creatinine Clearance: 44.1 mL/min (by C-G formula based on SCr of 0.71 mg/dL).  Results from last 7 days   Lab Units  12/10/18   0632  18   1931   CALCIUM mg/dL  9.0  9.5   ALBUMIN g/dL  3.40*  4.00     Results from last 7 days   Lab Units  12/10/18   0632  18   1931   ALBUMIN g/dL  3.40*  4.00   BILIRUBIN mg/dL  0.9  0.7   ALK " PHOS U/L  78  98   AST (SGOT) U/L  14  17   ALT (SGPT) U/L  6  7       Assessment:    Closed 2-part intertrochanteric fracture of left femur:  Ortho eval appreciated.     Dementia    Type II diabetes mellitus:  Cont SSI.  Hold Glucophage.     Benign essential hypertension w sinus tach:  Will start low dose beta blocker.     Hypothyroidism    Slow transit constipation    Fall        Plan:  Please see above.     Cresencio Qureshi MD  12/10/2018  2:19 PM

## 2018-12-10 NOTE — PROGRESS NOTES
Discharge Planning Assessment  [facility]     Patient Name: Sienna Ortiz  MRN: [id number]  Today's Date: 12/10/2018    Admit Date: 12/9/2018    Discharge Needs Assessment     Row Name 12/10/18 1113       Living Environment    Lives With  child(monica), adult;grandchild(monica)    Name(s) of Who Lives With Patient  Son ( Abiel Ortiz) and his wife (Lalo) , pt's granddaughter  (Gilda Nair) and her two children    Primary Care Provided by  child(monica)    Provides Primary Care For  no one    Family Caregiver if Needed  child(monica), adult    Family Caregiver Names  Son ( Abiel Ortiz)    Able to Return to Prior Arrangements  yes    Living Arrangement Comments  Pt lives in a two story house with son ( Abiel Ortiz) and his wife (Lalo) , pt's granddaughter  (Gilda Nair) and her two children.       Resource/Environmental Concerns    Resource/Environmental Concerns  none    Transportation Concerns  car, none       Transition Planning    Patient/Family Anticipates Transition to  home with family    Patient/Family Anticipated Services at Transition  none    Transportation Anticipated  family or friend will provide       Discharge Needs Assessment    Concerns to be Addressed  denies needs/concerns at this time    Equipment Currently Used at Home  cane, straight    Anticipated Changes Related to Illness  none    Equipment Needed After Discharge  cane, straight    Offered/Gave Vendor List  no        Discharge Plan     Row Name 12/10/18 1111       Plan    Plan  Plan to be determined-  awaiting surgery on 12/11.   LILIAN Mott    Plan Comments  FACE SHEET VERIFIED/ IM LETTER SIGNED.  Spoke to pt's son  ( Abiel Ortiz  [phone]) per phone.  Pt's PCP is Dr. Sean Mayer.  Pt lives in a two story house with son ( Abiel Ortiz) and his wife (Lalo) , pt's granddaughter  (Gilda Nair) and her two children.   Pt has a straight cane she uses at home.  Pt gets her prescriptions at [facility]   ([place] [facility]).   Pt's son denies any issues affording medications.  Pt is not current with HH.  Pt has been in [facility] in the past.  CCP to follow for needs post op.   Tiera, RN        Destination      No service coordination in this encounter.      Durable Medical Equipment      No service coordination in this encounter.      Dialysis/Infusion      No service coordination in this encounter.      Home Medical Care      No service coordination in this encounter.      Community Resources      No service coordination in this encounter.          Demographic Summary     Row Name 12/10/18 1112       General Information    Admission Type  inpatient    Arrived From  emergency department    Required Notices Provided  Important Message from Medicare    Referral Source  admission list    Reason for Consult  discharge planning    Preferred Language  English     Used During This Interaction  no        Functional Status     Row Name 12/10/18 1112       Functional Status    Usual Activity Tolerance  moderate    Current Activity Tolerance  poor       Functional Status, IADL    Medications  assistive person    Meal Preparation  assistive person    Housekeeping  completely dependent    Laundry  completely dependent    Shopping  completely dependent       Mental Status    General Appearance WDL  WDL       Mental Status Summary    Recent Changes in Mental Status/Cognitive Functioning  ability to understand;decision making/judgment        Psychosocial    No documentation.       Abuse/Neglect    No documentation.       Legal    No documentation.       Substance Abuse    No documentation.       Patient Forms    No documentation.           Eileen Crenshaw, RN

## 2018-12-10 NOTE — CONSULTS
Orthopedic Consult      Patient: Sienna Ortiz    Date of Admission: 12/9/2018  4:33 PM    YOB: 1929    Medical Record Number: [id number]    Attending Physician: Cresencio Qureshi MD  Consulting Physician:  Tayo GILMORE, orthopedic surgery.      Chief Complaints: Closed fracture of left hip, initial encounter (CMS/[facility]) [S72.002A]  Fall, initial encounter [W19.XXXA]      History of Present Illness: 89 y.o. female admitted to [facility] to services of Cresencio Qureshi MD with Closed fracture of left hip, initial encounter (CMS/[facility]) [S72.002A]  Fall, initial encounter [W19.XXXA].  I was consulted for further evaluation and treatment. Onset of symptoms was acute in onset with left lower extremity deformity at the hip and inability to bear any weight.  Symptoms are associated with shortening and external rotation deformity of the left hip at the proximal femur.  Symptoms are aggravated by any sort of active or passive motion of the hip and proximal femur.   Symptoms improve with strict bed rest.     No Known Allergies    Medications:   Home Medications:  Medications Prior to Admission   Medication Sig Dispense Refill Last Dose   • aspirin 81 MG EC tablet Take 81 mg by mouth daily. Take 1 tablet by oral route once daily for 30 days   Taking   • docusate sodium (COLACE) 100 MG capsule Take 1 capsule by mouth 2 (two) times a day. 60 capsule 5    • donepezil (ARICEPT) 10 MG tablet Take 1 tablet by mouth every night. 30 tablet 5    • levothyroxine (SYNTHROID, LEVOTHROID) 75 MCG tablet Take 1 tablet by mouth daily. 30 tablet 5    • metFORMIN (GLUCOPHAGE) 500 MG tablet Take 1 tablet by mouth 3 (three) times a day. 90 tablet 5    • metoprolol tartrate (LOPRESSOR) 25 MG tablet Take 1 tablet by mouth 2 (two) times a day. 60 tablet 5    • omeprazole OTC (PriLOSEC OTC) 20 MG EC tablet Take 20 mg by mouth daily. Take 1 tablet by oral route once daily for 30 days   Not Taking   • pravastatin (PRAVACHOL)  "40 MG tablet Take 1 tablet by mouth daily. Take 1 tablet by oral route once daily for 30 days 30 tablet 5    • UNABLE TO FIND Novofine 32 miscellaneous needle 32 X 1/4\"; use as directed with pen      • UNABLE TO FIND Vitamin B-12 injection solution 1,000 mcg/mL; inject 1 milliliter by intramuscular route once a month for 90 days          Current Medications:  Scheduled Meds:  insulin lispro 0-9 Units Subcutaneous 4x Daily With Meals & Nightly   sodium chloride 3 mL Intravenous Q12H     Continuous Infusions:  sodium chloride 125 mL/hr Last Rate: 125 mL/hr (12/10/18 1205)     PRN Meds:.•  acetaminophen  •  dextrose  •  dextrose  •  glucagon (human recombinant)  •  Morphine  •  [COMPLETED] Insert peripheral IV **AND** sodium chloride  •  sodium chloride       Past Medical History:   Diagnosis Date   • Anemia, pernicious 10/12/2014   • Benign essential hypertension 10/12/2014   • 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   • Dementia 02/19/2014   • Hyperlipidemia    • Hypothyroidism    • Type II diabetes mellitus (CMS/[facility])    • Vitamin D deficiency 07/26/2011    unspecified        Past Surgical History:   Procedure Laterality Date   • REPLACEMENT TOTAL HIP LATERAL POSITION Right         Social History     Occupational History   • Not on file   Tobacco Use   • Smoking status: Never Smoker   Substance and Sexual Activity   • Alcohol use: No   • Drug use: Not on file   • Sexual activity: Not on file    Social History     Social History Narrative   • Not on file        Family History   Problem Relation Age of Onset   • Hypertension Other          Review of Systems:   HEENT: Patient denies any headaches, vision changes, change in hearing, or tinnitus, Patient denies any rhinorrhea,epistaxis, sinus pain, mouth or dental problems, sore throat or hoarseness, or dysphagia  Pulmonary: Patient denies any cough, congestion, SOA, or wheezing  Cardiovascular: Patient denies any chest pain, dyspnea, palpitations, weakness, intolerance of " exercise, varicosities, swelling of extremities, known murmur  Gastrointestinal:  Patient denies nausea, vomiting, diarrhea, constipation, loss  of appetite, change in appetite, dysphagia, gas, heartburn, melena, change in bowel habits, use of laxatives or other drugs to alter the function of the gastrointestinal tract.  Genital/Urinary: Patient denies dysuria, change in color of urine, change in frequency of urination, pain with urgency, incontinence, retention, or nocturia.  Musculoskeletal: Patient denies increased warmth; redness; or swelling of joints; limitation of function; deformity; crepitation: pain in a joint or an extremity, the neck, or the back, especially with movement.  Neurological: Patient denies dizziness, tremor, ataxia, difficulty in speaking, change in speech, paresthesia, loss of sensation, seizures, syncope, changes in memory.  Endocrine system: Patient denies tremors, palpitations, intolerance of heat or cold, polyuria, polydipsia, polyphagia, diaphoresis, exophthalmos, or goiter.  Psychological: Patient denies thoughts/plans or harming self or other; depression,  insomnia, night terrors, radha, memory loss, disorientation.  Skin: Patient denies any bruising, rashes, discoloration, pruritus, wounds, ulcers, decubiti, changes in the hair or nails  Hematopoietic: Patient denies history of spontaneous or excessive bleeding, epistaxis, hematuria, melena, fatigue, enlarged or tender lymph nodes, pallor, history of anemia.    Physical Exam: 89 y.o. female  Vitals:    12/09/18 2100 12/10/18 0004 12/10/18 0017 12/10/18 0815   BP: (!) 168/103  Comment: rn notified (!) 124/109  Comment: rn notified 132/80 116/78   BP Location: Right arm Right arm Left arm Right arm   Patient Position: Lying Lying  Lying   Pulse: 107 110  110   Resp: 18 18  16   Temp: 97.6 °F (36.4 °C) 97.6 °F (36.4 °C)     TempSrc: Axillary Oral     SpO2: 98%   95%   Weight:       Height:         General Appearance:    Alert,  cooperative, in no acute distress                      Head:    Normocephalic, without obvious abnormality, atraumatic   Eyes:            Lids and lashes normal, conjunctivae and sclerae normal, no   icterus, no pallor, corneas clear, PERRLA   Ears:    Ears appear intact with no abnormalities noted   Throat:   No oral lesions, no thrush, oral mucosa moist   Neck:   No adenopathy, supple, trachea midline, no thyromegaly, no   carotid bruit, no JVD   Back:     No kyphosis present, no scoliosis present, no skin lesions,      erythema or scars, no tenderness to percussion or                   palpation,   range of motion normal   Lungs:     Clear to auscultation,respirations regular, even and                  unlabored    Heart:    Regular rhythm and normal rate, normal S1 and S2, no            murmur, no gallop, no rub, no click   Chest Wall:    No abnormalities observed   Abdomen:     Normal bowel sounds, no masses, no organomegaly, soft        non-tender, non-distended, no guarding, no rebound                tenderness   Rectal:     Deferred   Extremities:   Tenderness noted at anterior and lateral aspect of the left hip over the greater trochanteric bursa.  Moves all extremities well, no       edema, no cyanosis, no redness   Pulses:   Pulses palpable and equal bilaterally   Skin:   No bleeding, bruising or rash   Lymph nodes:   No palpable adenopathy   Neurologic:   Cranial nerves 2 - 12 grossly intact, sensation intact, DTR       present and equal bilaterally      left Hip. Skin and soft tissues are swollen and bruised consistent with fracture. There is a shortening with external rotation deformity. Patient is unable to perform a straight leg raise exam actively or passively. Any rotation or flexion of the hip bothers the patient very significantly. The anterior joint line is exquisitely tender. IT band is painful and tender. Greater trochanter is tender. Patient is unable to bear weight on this affected  extremity. There is no evidence of compartmental syndrome. Dorsalis pedis and posterior tibial artery pulses are palpable. Joint line is exquisitely tender. There is no evidence of a compartmental syndrome.         Diagnostic Tests:  Admission on 12/09/2018   Component Date Value Ref Range Status   • Glucose 12/09/2018 218* 65 - 99 mg/dL Final   • BUN 12/09/2018 10  8 - 23 mg/dL Final   • Creatinine 12/09/2018 0.85  0.57 - 1.00 mg/dL Final   • Sodium 12/09/2018 140  136 - 145 mmol/L Final   • Potassium 12/09/2018 4.2  3.5 - 5.2 mmol/L Final   • Chloride 12/09/2018 101  98 - 107 mmol/L Final   • CO2 12/09/2018 21.3* 22.0 - 29.0 mmol/L Final   • Calcium 12/09/2018 9.5  8.6 - 10.5 mg/dL Final   • Total Protein 12/09/2018 7.8  6.0 - 8.5 g/dL Final   • Albumin 12/09/2018 4.00  3.50 - 5.20 g/dL Final   • ALT (SGPT) 12/09/2018 7  1 - 33 U/L Final   • AST (SGOT) 12/09/2018 17  1 - 32 U/L Final    Specimen hemolyzed.  Results may be affected.   • Alkaline Phosphatase 12/09/2018 98  39 - 117 U/L Final   • Total Bilirubin 12/09/2018 0.7  0.1 - 1.2 mg/dL Final   • eGFR   Amer 12/09/2018 76  >60 mL/min/1.73 Final   • Globulin 12/09/2018 3.8  gm/dL Final   • A/G Ratio 12/09/2018 1.1  g/dL Final   • BUN/Creatinine Ratio 12/09/2018 11.8  7.0 - 25.0 Final   • Anion Gap 12/09/2018 17.7  mmol/L Final   • Protime 12/09/2018 15.1* 11.7 - 14.2 Seconds Final   • INR 12/09/2018 1.21* 0.90 - 1.10 Final   • Color, UA 12/09/2018 Yellow  Yellow, Straw Final   • Appearance, UA 12/09/2018 Clear  Clear Final   • pH, UA 12/09/2018 7.0  5.0 - 8.0 Final   • Specific Gravity, UA 12/09/2018 1.019  1.005 - 1.030 Final   • Glucose, UA 12/09/2018 250 mg/dL (1+)* Negative Final   • Ketones, UA 12/09/2018 15 mg/dL (1+)* Negative Final   • Bilirubin, UA 12/09/2018 Negative  Negative Final   • Blood, UA 12/09/2018 Small (1+)* Negative Final   • Protein, UA 12/09/2018 Trace* Negative Final   • Leuk Esterase, UA 12/09/2018 Small (1+)* Negative Final   •  Nitrite, UA 12/09/2018 Negative  Negative Final   • Urobilinogen, UA 12/09/2018 1.0 E.U./dL  0.2 - 1.0 E.U./dL Final   • Troponin T 12/09/2018 <0.010  0.000 - 0.030 ng/mL Final   • proBNP 12/09/2018 533.6  0.0-1,800.0 pg/mL Final   • Creatine Kinase 12/09/2018 224* 20 - 180 U/L Final   • WBC 12/09/2018 12.44* 4.50 - 10.70 10*3/mm3 Final   • RBC 12/09/2018 4.70  3.90 - 5.20 10*6/mm3 Final   • Hemoglobin 12/09/2018 14.3  11.9 - 15.5 g/dL Final   • Hematocrit 12/09/2018 45.3  35.6 - 45.5 % Final   • MCV 12/09/2018 96.4  80.5 - 98.2 fL Final   • MCH 12/09/2018 30.4  26.9 - 32.0 pg Final   • MCHC 12/09/2018 31.6* 32.4 - 36.3 g/dL Final   • RDW 12/09/2018 12.9  11.7 - 13.0 % Final   • RDW-SD 12/09/2018 44.9  37.0 - 54.0 fl Final   • MPV 12/09/2018 10.5  6.0 - 12.0 fL Final   • Platelets 12/09/2018 288  140 - 500 10*3/mm3 Final   • Neutrophil % 12/09/2018 82.5* 42.7 - 76.0 % Final   • Lymphocyte % 12/09/2018 9.7* 19.6 - 45.3 % Final   • Monocyte % 12/09/2018 7.5  5.0 - 12.0 % Final   • Eosinophil % 12/09/2018 0.0* 0.3 - 6.2 % Final   • Basophil % 12/09/2018 0.0  0.0 - 1.5 % Final   • Immature Grans % 12/09/2018 0.3  0.0 - 0.5 % Final   • Neutrophils, Absolute 12/09/2018 10.26* 1.90 - 8.10 10*3/mm3 Final   • Lymphocytes, Absolute 12/09/2018 1.21  0.90 - 4.80 10*3/mm3 Final   • Monocytes, Absolute 12/09/2018 0.93  0.20 - 1.20 10*3/mm3 Final   • Eosinophils, Absolute 12/09/2018 0.00  0.00 - 0.70 10*3/mm3 Final   • Basophils, Absolute 12/09/2018 0.00  0.00 - 0.20 10*3/mm3 Final   • Immature Grans, Absolute 12/09/2018 0.04* 0.00 - 0.03 10*3/mm3 Final   • RBC, UA 12/09/2018 6-12* None Seen, 0-2 /HPF Final   • WBC, UA 12/09/2018 0-2  None Seen, 0-2 /HPF Final   • Bacteria, UA 12/09/2018 None Seen  None Seen /HPF Final   • Squamous Epithelial Cells, UA 12/09/2018 0-2  None Seen, 0-2 /HPF Final   • Hyaline Casts, UA 12/09/2018 0-2  None Seen /LPF Final   • Methodology 12/09/2018 Automated Microscopy   Final   • Glucose 12/09/2018  193* 70 - 130 mg/dL Final   • WBC 12/10/2018 8.46  4.50 - 10.70 10*3/mm3 Final   • RBC 12/10/2018 4.12  3.90 - 5.20 10*6/mm3 Final   • Hemoglobin 12/10/2018 12.2  11.9 - 15.5 g/dL Final   • Hematocrit 12/10/2018 39.5  35.6 - 45.5 % Final   • MCV 12/10/2018 95.9  80.5 - 98.2 fL Final   • MCH 12/10/2018 29.6  26.9 - 32.0 pg Final   • MCHC 12/10/2018 30.9* 32.4 - 36.3 g/dL Final   • RDW 12/10/2018 13.0  11.7 - 13.0 % Final   • RDW-SD 12/10/2018 45.2  37.0 - 54.0 fl Final   • MPV 12/10/2018 10.9  6.0 - 12.0 fL Final   • Platelets 12/10/2018 257  140 - 500 10*3/mm3 Final   • Neutrophil % 12/10/2018 71.9  42.7 - 76.0 % Final   • Lymphocyte % 12/10/2018 21.2  19.6 - 45.3 % Final   • Monocyte % 12/10/2018 6.5  5.0 - 12.0 % Final   • Eosinophil % 12/10/2018 0.1* 0.3 - 6.2 % Final   • Basophil % 12/10/2018 0.1  0.0 - 1.5 % Final   • Immature Grans % 12/10/2018 0.2  0.0 - 0.5 % Final   • Neutrophils, Absolute 12/10/2018 6.08  1.90 - 8.10 10*3/mm3 Final   • Lymphocytes, Absolute 12/10/2018 1.79  0.90 - 4.80 10*3/mm3 Final   • Monocytes, Absolute 12/10/2018 0.55  0.20 - 1.20 10*3/mm3 Final   • Eosinophils, Absolute 12/10/2018 0.01  0.00 - 0.70 10*3/mm3 Final   • Basophils, Absolute 12/10/2018 0.01  0.00 - 0.20 10*3/mm3 Final   • Immature Grans, Absolute 12/10/2018 0.02  0.00 - 0.03 10*3/mm3 Final   • Glucose 12/10/2018 182* 65 - 99 mg/dL Final   • BUN 12/10/2018 9  8 - 23 mg/dL Final   • Creatinine 12/10/2018 0.71  0.57 - 1.00 mg/dL Final   • Sodium 12/10/2018 141  136 - 145 mmol/L Final   • Potassium 12/10/2018 3.5  3.5 - 5.2 mmol/L Final   • Chloride 12/10/2018 106  98 - 107 mmol/L Final   • CO2 12/10/2018 21.8* 22.0 - 29.0 mmol/L Final   • Calcium 12/10/2018 9.0  8.6 - 10.5 mg/dL Final   • Total Protein 12/10/2018 6.7  6.0 - 8.5 g/dL Final   • Albumin 12/10/2018 3.40* 3.50 - 5.20 g/dL Final   • ALT (SGPT) 12/10/2018 6  1 - 33 U/L Final   • AST (SGOT) 12/10/2018 14  1 - 32 U/L Final   • Alkaline Phosphatase 12/10/2018 78  39 -  117 U/L Final   • Total Bilirubin 12/10/2018 0.9  0.1 - 1.2 mg/dL Final   • eGFR   Amer 12/10/2018 94  >60 mL/min/1.73 Final   • Globulin 12/10/2018 3.3  gm/dL Final   • A/G Ratio 12/10/2018 1.0  g/dL Final   • BUN/Creatinine Ratio 12/10/2018 12.7  7.0 - 25.0 Final   • Anion Gap 12/10/2018 13.2  mmol/L Final   • Creatine Kinase 12/10/2018 150  20 - 180 U/L Final   • Hemoglobin A1C 12/10/2018 6.90* 4.80 - 5.60 % Final   • Glucose 12/10/2018 180* 70 - 130 mg/dL Final   • ABO Type 12/10/2018 O   Final   • RH type 12/10/2018 Positive   Final   • Antibody Screen 12/10/2018 Negative   Final   • T&S Expiration Date 12/10/2018 12/13/2018 11:59:59 PM   Final   • Glucose 12/10/2018 250* 70 - 130 mg/dL Final     No results found.    Assessment:  Patient Active Problem List   Diagnosis   • Anemia, pernicious   • Cerebellar ataxia (CMS/HCC)   • Dementia   • Type II diabetes mellitus (CMS/HCC)   • Hyperlipidemia   • Benign essential hypertension   • Hypothyroidism   • Vitamin D deficiency   • Closed fracture of right hip with routine healing   • Slow transit constipation   • Fall   • Closed 2-part intertrochanteric fracture of left femur (CMS/HCC)     X-rays are reviewed: The x-rays of the hip AP and lateral are consistent with a displaced fracture of the left proximal femur with osteoporosis.  The femur fracture is in an intertrochanteric location with eversion of the lesser trochanter.      Plan:  The patient and family voiced understanding of the risks, benefits, and alternative forms of treatment that were discussed and the patient consents to proceed with open reduction internal fixation of the left proximal femur fracture.       Discharge Plan: Unknown at this point to rehab after successful performance of the ORIF of the left femur fracture      Date: 12/10/2018    Rafa Johnson MD      Time: Critical care 20 min     DICTATED UTILIZING DRAGON DICTATION

## 2018-12-10 NOTE — H&P
Internal medicine history and physical    INTERNAL MEDICINE   Psychiatric       Patient Identification:  Name: Sienna Ortiz  Age: 89 y.o.  Sex: female  :  3/4/1929  MRN: [id number]                   Primary Care Physician: Sean Mayer MD                                   Chief Complaint:  Brought to the emergency room per family members request for evaluation of increasing left hip pain and sudden immobility.    History of Present Illness:   Patient is a 89-year-old female with significant dementia and unable to give an account of her symptoms was evidently found to be doing okay at around 5 this morning.  Patient usually ambulates with the assistance of cane.  Patient was then found down in her room around 1 PM by family members resulting in EMS being called and brought to emergency room because she was unable to stand bear weight and was complaining of pain in her left leg.  Workup in the emergency room revealed left intertrochanteric fracture.      Past Medical History:  Past Medical History:   Diagnosis Date   • Anemia, pernicious 10/12/2014   • Benign essential hypertension 10/12/2014   • Cerebellar ataxia (CMS/[facility]) 10/22/2013   • Dementia 2014   • Hyperlipidemia    • Hypothyroidism    • Type II diabetes mellitus (CMS/[facility])    • Vitamin D deficiency 2011    unspecified     Past Surgical History:  History reviewed. No pertinent surgical history.   Home Meds:    (Not in a hospital admission)  Current Meds:     Current Facility-Administered Medications:   •  [COMPLETED] Insert peripheral IV, , , Once **AND** sodium chloride 0.9 % flush 10 mL, 10 mL, Intravenous, PRN, Desire Recio MD  •  sodium chloride 0.9 % infusion, 125 mL/hr, Intravenous, Continuous, Desire Recio MD, Last Rate: 125 mL/hr at 18, 125 mL/hr at 18    Current Outpatient Medications:   •  aspirin 81 MG EC tablet, Take 81 mg by mouth daily. Take 1 tablet by oral route once daily for 30 days,  "Disp: , Rfl:   •  docusate sodium (COLACE) 100 MG capsule, Take 1 capsule by mouth 2 (two) times a day., Disp: 60 capsule, Rfl: 5  •  donepezil (ARICEPT) 10 MG tablet, Take 1 tablet by mouth every night., Disp: 30 tablet, Rfl: 5  •  levothyroxine (SYNTHROID, LEVOTHROID) 75 MCG tablet, Take 1 tablet by mouth daily., Disp: 30 tablet, Rfl: 5  •  metFORMIN (GLUCOPHAGE) 500 MG tablet, Take 1 tablet by mouth 3 (three) times a day., Disp: 90 tablet, Rfl: 5  •  metoprolol tartrate (LOPRESSOR) 25 MG tablet, Take 1 tablet by mouth 2 (two) times a day., Disp: 60 tablet, Rfl: 5  •  omeprazole OTC (PriLOSEC OTC) 20 MG EC tablet, Take 20 mg by mouth daily. Take 1 tablet by oral route once daily for 30 days, Disp: , Rfl:   •  pravastatin (PRAVACHOL) 40 MG tablet, Take 1 tablet by mouth daily. Take 1 tablet by oral route once daily for 30 days, Disp: 30 tablet, Rfl: 5  •  UNABLE TO FIND, Novofine 32 miscellaneous needle 32 X 1/4\"; use as directed with pen, Disp: , Rfl:   •  UNABLE TO FIND, Vitamin B-12 injection solution 1,000 mcg/mL; inject 1 milliliter by intramuscular route once a month for 90 days, Disp: , Rfl:   Allergies:  No Known Allergies  Social History:   Social History     Tobacco Use   • Smoking status: Never Smoker   Substance Use Topics   • Alcohol use: No      Family History:  Family History   Problem Relation Age of Onset   • Hypertension Other           Review of Systems  See history of present illness and past medical history.    Could not be obtained from the patient because of severe dementia.        Vitals:   /85   Pulse 120   Temp 99 °F (37.2 °C) (Tympanic)   Resp 18   Ht 160 cm (63\")   Wt 68 kg (150 lb)   SpO2 100%   BMI 26.57 kg/m²   I/O: No intake or output data in the 24 hours ending 12/09/18 2046  Exam:  General Appearance:    Awake uncooperative and doesn't know where she sat at this time.     Head:    Normocephalic, without obvious abnormality, atraumatic   Eyes:    PERRL, " conjunctiva/corneas clear, EOM's intact, both eyes   Ears:    Normal external ear canals, both ears   Nose:   Nares normal, septum midline, mucosa normal, no drainage    or sinus tenderness   Throat:   Lips, tongue, gums normal; oral mucosa pink and moist   Neck:   Supple, symmetrical, trachea midline, no adenopathy;     thyroid:  no enlargement/tenderness/nodules; no carotid    bruit or JVD   Back:     Symmetric, no curvature, ROM normal, no CVA tenderness   Lungs:     Clear to auscultation bilaterally, respirations unlabored   Chest Wall:    No tenderness or deformity    Heart:    Regular rate and rhythm, S1 and S2 normal, no murmur, rub   or gallop   Abdomen:     Soft, non-tender, bowel sounds active all four quadrants,     no masses, no hepatomegaly, no splenomegaly   Extremities:   Significant discomfort in the left hip with left leg being externally rotated and short.   Pulses:   Pulses palpable in all extremities; symmetric all extremities   Skin:   Skin color normal, Skin is warm and dry,  no rashes or palpable lesions   Neurologic:  Grossly nonfocal but left leg movement is significantly hampered because of the significant left femoral neck .       Data Review:      I reviewed the patient's new clinical results.  Results from last 7 days   Lab Units  12/09/18   2019   WBC 10*3/mm3  12.44*   HEMOGLOBIN g/dL  14.3   PLATELETS 10*3/mm3  288     Results from last 7 days   Lab Units  12/09/18   1931   SODIUM mmol/L  140   POTASSIUM mmol/L  4.2   CHLORIDE mmol/L  101   CO2 mmol/L  21.3*   BUN mg/dL  10   CREATININE mg/dL  0.85   CALCIUM mg/dL  9.5   GLUCOSE mg/dL  218*   Xr Knee 1 Or 2 View Left    Result Date: 12/9/2018  Degenerative disease of the left knee with no acute process identified.  AP PELVIS AND LEFT HIP  There is an intertrochanteric fracture of the left hip showing mild displacement. Femoral head articulates with the acetabulum. No other fractures of the pelvis are seen.  Right hip prosthesis is  seen in good position.  IMPRESSION:  Intertrochanteric left hip fracture.  AP CHEST  Heart size is within normal limits. Lungs are underinflated with some elevation of the right hemidiaphragm. No focal infiltrates are seen. There is vascular crowding. Prominent soft tissue is seen in the medial aspect of the right apex, likely related to ectatic vascular structures and similar to the 09/05/2015 study.  IMPRESSION:  No acute process.      Ct Head Without Contrast    Result Date: 12/9/2018  1. No acute intracranial abnormality.         This report was finalized on 12/9/2018 7:40 PM by Anthony Ace M.D.      Xr Chest 1 View    Result Date: 12/9/2018  Degenerative disease of the left knee with no acute process identified.  AP PELVIS AND LEFT HIP  There is an intertrochanteric fracture of the left hip showing mild displacement. Femoral head articulates with the acetabulum. No other fractures of the pelvis are seen.  Right hip prosthesis is seen in good position.  IMPRESSION:  Intertrochanteric left hip fracture.  AP CHEST  Heart size is within normal limits. Lungs are underinflated with some elevation of the right hemidiaphragm. No focal infiltrates are seen. There is vascular crowding. Prominent soft tissue is seen in the medial aspect of the right apex, likely related to ectatic vascular structures and similar to the 09/05/2015 study.  IMPRESSION:  No acute process.      Xr Hip With Or Without Pelvis 2 - 3 View Left    Result Date: 12/9/2018  Degenerative disease of the left knee with no acute process identified.  AP PELVIS AND LEFT HIP  There is an intertrochanteric fracture of the left hip showing mild displacement. Femoral head articulates with the acetabulum. No other fractures of the pelvis are seen.  Right hip prosthesis is seen in good position.  IMPRESSION:  Intertrochanteric left hip fracture.  AP CHEST  Heart size is within normal limits. Lungs are underinflated with some elevation of the right  hemidiaphragm. No focal infiltrates are seen. There is vascular crowding. Prominent soft tissue is seen in the medial aspect of the right apex, likely related to ectatic vascular structures and similar to the 09/05/2015 study.  IMPRESSION:  No acute process.                Assessment:  Active Hospital Problems    Diagnosis Date Noted   • **Closed 2-part intertrochanteric fracture of left femur (CMS/[facility]) [S72.142A] 12/09/2018   • Fall [W19.XXXA] 12/09/2018   • Slow transit constipation [K59.01] 11/04/2015   • Type II diabetes mellitus (CMS/[facility]) [E11.9]    • Hypothyroidism [E03.9]    • Benign essential hypertension [I10] 10/12/2014   • Dementia [F03.90] 02/19/2014       Medical decision making:  Unwitnessed fall with left hip pain and x-ray evidence of left femoral neck fracture-plan is to admit the patient consult orthopedic surgery on call keep her nothing by mouth after midnight.  Preop evaluation and clearance: Very difficult to get review of systems proper from this patient in terms of whether she is having a declining functional status from cardio vascular standpoint such as orthopnea PND swelling of her legs or dyspnea on exertion.  Given the fact that she was able to ambulate with a cane until this morning and the fact that there is no overt obvious symptoms of cardiac decompensation I think patient should be able to undergo needed left hip surgical intervention with acceptable cardiovascular risk.  Patient does need to be monitored closely intraoperatively and perioperatively for hypertension and postop complications. Prognosis of this left hip fracture due to fall in the context of severe dementia is very poor.  Type 2 diabetes-continue with Accu-Cheks and sliding scale coverage  Hypertension-continue her antihypertensive regimen  Severe dementia making her at high risk of sundowning and will stop surgical complications because of her lack of ability to participate in proper postop surgical  care.  Hypothyroidism-continue thyroid replacement therapy.    Margaret Israel MD   12/9/2018  8:46 PM  Much of this encounter note is an electronic transcription/translation of spoken language to printed text. The electronic translation of spoken language may permit erroneous, or at times, nonsensical words or phrases to be inadvertently transcribed; Although I have reviewed the note for such errors, some may still exist

## 2018-12-10 NOTE — PROGRESS NOTES
Orthopedic Consult      Patient: Sienna Ortiz    Date of Admission: 12/9/2018  4:33 PM    YOB: 1929    Medical Record Number: [id number]    Attending Physician: Cresencio Qureshi MD    Consulting Physician: Cresencio Qureshi MD    Chief Complaints: Left intertrochanteric femur fracture    History of Present Illness: 89 y.o. female admitted to [facility] to services of Cresencio Qureshi MD with an unstable intertrochanteric femur fracture. Patient reports that injury was sustained in a fall. Landed awkwardly on the affected hip. Noticed immediate pain and inability to bear weight on the leg. Was brought to the ER and diagnosed with an intertrochanteric hip fracture. Localizes the pain to the groin. Describes pain as severe, constant, worse with any movement but alleviated by rest. Denies LOC. Denies any other associated injury or complaint prior to this she has used a cane for ambulation and lives with her son at his house.    She did have a right hip fracture several years ago treated with a bipolar and went to rehabilitation thereafter.    Allergies: No Known Allergies    Medications:   Home Medications:    Current Facility-Administered Medications:   •  acetaminophen (TYLENOL) tablet 650 mg, 650 mg, Oral, Q4H PRN, Margaret Israel MD  •  dextrose (D50W) 25 g/ 50mL Intravenous Solution 25 g, 25 g, Intravenous, Q15 Min PRN, Margaret Israel MD  •  dextrose (GLUTOSE) oral gel 15 g, 15 g, Oral, Q15 Min PRN, Margaret Israel MD  •  glucagon (human recombinant) (GLUCAGEN DIAGNOSTIC) injection 1 mg, 1 mg, Subcutaneous, PRN, Margaret Israel MD  •  insulin lispro (humaLOG) injection 0-9 Units, 0-9 Units, Subcutaneous, 4x Daily With Meals & Nightly, Margaret Israel MD, 6 Units at 12/10/18 1237  •  morphine injection 2 mg, 2 mg, Intravenous, Q4H PRN, Margaret Israel MD  •  [COMPLETED] Insert peripheral IV, , , Once **AND** sodium chloride 0.9 % flush 10 mL, 10 mL, Intravenous, PRN, Desire Recio MD  •   sodium chloride 0.9 % flush 3 mL, 3 mL, Intravenous, Q12H, Margaret Israel MD, 3 mL at 12/10/18 0900  •  sodium chloride 0.9 % flush 3-10 mL, 3-10 mL, Intravenous, PRN, Margaret Israel MD  •  sodium chloride 0.9 % infusion, 125 mL/hr, Intravenous, Continuous, Desire Recio MD, Last Rate: 125 mL/hr at 12/10/18 1205, 125 mL/hr at 12/10/18 1205    Current Medications:  Scheduled Meds:  insulin lispro 0-9 Units Subcutaneous 4x Daily With Meals & Nightly   sodium chloride 3 mL Intravenous Q12H     Continuous Infusions:  sodium chloride 125 mL/hr Last Rate: 125 mL/hr (12/10/18 1205)     PRN Meds:.•  acetaminophen  •  dextrose  •  dextrose  •  glucagon (human recombinant)  •  Morphine  •  [COMPLETED] Insert peripheral IV **AND** sodium chloride  •  sodium chloride    Past Medical History:   Diagnosis Date   • Anemia, pernicious 10/12/2014   • Benign essential hypertension 10/12/2014   • 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   • Dementia 02/19/2014   • Hyperlipidemia    • Hypothyroidism    • Type II diabetes mellitus (CMS/[facility])    • Vitamin D deficiency 07/26/2011    unspecified       Past Surgical History:   Procedure Laterality Date   • REPLACEMENT TOTAL HIP LATERAL POSITION Right        Social History     Occupational History   • Not on file   Tobacco Use   • Smoking status: Never Smoker   Substance and Sexual Activity   • Alcohol use: No   • Drug use: Not on file   • Sexual activity: Not on file    Social History     Social History Narrative   • Not on file       Family History   Problem Relation Age of Onset   • Hypertension Other          Review of Systems:     Constitutional:  Denies fever, shaking or chills   Eyes:  Denies change in visual acuity   HEENT:  Denies nasal congestion or sore throat   Respiratory:  Denies cough or shortness of breath   Cardiovascular:  Denies chest pain or edema  Endocrine: Denies tremors, palpitations, intolerance of heat or cold, polyuria, polydipsia.  GI:  Denies abdominal pain, nausea,  vomiting, bloody stools or diarrhea  :  Denies frequency, urgency, incontinence, retention, or nocturia.  Musculoskeletal:  Denies numbness tingling or loss of motor function except as above  Integument:  Denies rash, lesion or ulceration   Neurologic:  Denies headache or focal weakness, deficits  Heme:  Denies epistaxis, spontaneous or excessive bleeding, epistaxis, hematuria, melena, fatigue, enlarged or tender lymph nodes.      All other pertinent positives and negatives as noted above in HPI.    Physical Exam: 89 y.o. female    General:  Awake, alert. No acute distress.      Head/Neck:  Normocephalic, atraumatic.  Conjunctiva and sclera clear.  Hearing adequate for the exam.  Neck is supple with normal ROM.    Psych:  Affect and demeanor appropriate.    CV:  Regular rate and rhythm.  Hemodynamically stable.    Lungs:  Good chest expansion, breathing unlabored.    Abdomen:  Soft.  Non-tender, non-distended.    Extremities:      Left lower extremity: Skin appears benign without obvious lacerations, ulcerations or lesions. Leg appears shortened and externally rotated. Compartments soft without evidence for DVT or compartment syndrome. No atrophy. NO palpable masses or adenopathy. Focal TTP over hip. ROM of hip extremely limited and uncomfortable. NO pain with passive motion of knee, ankle. Strength well-preserved distally. Sensation to light touch grossly intact distally. Good skin turgor, brisk cap refill  distally.      All other extremities atraumatic without gross abnormality. Contralateral leg demonstrates no bony tenderness. Good hip, knee motion. NO instability. Good motor and sensory function distally with brisk cap refill.    Diagnostic Tests:    Admission on 12/09/2018   Component Date Value Ref Range Status   • Glucose 12/09/2018 218* 65 - 99 mg/dL Final   • BUN 12/09/2018 10  8 - 23 mg/dL Final   • Creatinine 12/09/2018 0.85  0.57 - 1.00 mg/dL Final   • Sodium 12/09/2018 140  136 - 145 mmol/L Final    • Potassium 12/09/2018 4.2  3.5 - 5.2 mmol/L Final   • Chloride 12/09/2018 101  98 - 107 mmol/L Final   • CO2 12/09/2018 21.3* 22.0 - 29.0 mmol/L Final   • Calcium 12/09/2018 9.5  8.6 - 10.5 mg/dL Final   • Total Protein 12/09/2018 7.8  6.0 - 8.5 g/dL Final   • Albumin 12/09/2018 4.00  3.50 - 5.20 g/dL Final   • ALT (SGPT) 12/09/2018 7  1 - 33 U/L Final   • AST (SGOT) 12/09/2018 17  1 - 32 U/L Final    Specimen hemolyzed.  Results may be affected.   • Alkaline Phosphatase 12/09/2018 98  39 - 117 U/L Final   • Total Bilirubin 12/09/2018 0.7  0.1 - 1.2 mg/dL Final   • eGFR   Amer 12/09/2018 76  >60 mL/min/1.73 Final   • Globulin 12/09/2018 3.8  gm/dL Final   • A/G Ratio 12/09/2018 1.1  g/dL Final   • BUN/Creatinine Ratio 12/09/2018 11.8  7.0 - 25.0 Final   • Anion Gap 12/09/2018 17.7  mmol/L Final   • Protime 12/09/2018 15.1* 11.7 - 14.2 Seconds Final   • INR 12/09/2018 1.21* 0.90 - 1.10 Final   • Color, UA 12/09/2018 Yellow  Yellow, Straw Final   • Appearance, UA 12/09/2018 Clear  Clear Final   • pH, UA 12/09/2018 7.0  5.0 - 8.0 Final   • Specific Gravity, UA 12/09/2018 1.019  1.005 - 1.030 Final   • Glucose, UA 12/09/2018 250 mg/dL (1+)* Negative Final   • Ketones, UA 12/09/2018 15 mg/dL (1+)* Negative Final   • Bilirubin, UA 12/09/2018 Negative  Negative Final   • Blood, UA 12/09/2018 Small (1+)* Negative Final   • Protein, UA 12/09/2018 Trace* Negative Final   • Leuk Esterase, UA 12/09/2018 Small (1+)* Negative Final   • Nitrite, UA 12/09/2018 Negative  Negative Final   • Urobilinogen, UA 12/09/2018 1.0 E.U./dL  0.2 - 1.0 E.U./dL Final   • Troponin T 12/09/2018 <0.010  0.000 - 0.030 ng/mL Final   • proBNP 12/09/2018 533.6  0.0-1,800.0 pg/mL Final   • Creatine Kinase 12/09/2018 224* 20 - 180 U/L Final   • WBC 12/09/2018 12.44* 4.50 - 10.70 10*3/mm3 Final   • RBC 12/09/2018 4.70  3.90 - 5.20 10*6/mm3 Final   • Hemoglobin 12/09/2018 14.3  11.9 - 15.5 g/dL Final   • Hematocrit 12/09/2018 45.3  35.6 - 45.5 %  Final   • MCV 12/09/2018 96.4  80.5 - 98.2 fL Final   • MCH 12/09/2018 30.4  26.9 - 32.0 pg Final   • MCHC 12/09/2018 31.6* 32.4 - 36.3 g/dL Final   • RDW 12/09/2018 12.9  11.7 - 13.0 % Final   • RDW-SD 12/09/2018 44.9  37.0 - 54.0 fl Final   • MPV 12/09/2018 10.5  6.0 - 12.0 fL Final   • Platelets 12/09/2018 288  140 - 500 10*3/mm3 Final   • Neutrophil % 12/09/2018 82.5* 42.7 - 76.0 % Final   • Lymphocyte % 12/09/2018 9.7* 19.6 - 45.3 % Final   • Monocyte % 12/09/2018 7.5  5.0 - 12.0 % Final   • Eosinophil % 12/09/2018 0.0* 0.3 - 6.2 % Final   • Basophil % 12/09/2018 0.0  0.0 - 1.5 % Final   • Immature Grans % 12/09/2018 0.3  0.0 - 0.5 % Final   • Neutrophils, Absolute 12/09/2018 10.26* 1.90 - 8.10 10*3/mm3 Final   • Lymphocytes, Absolute 12/09/2018 1.21  0.90 - 4.80 10*3/mm3 Final   • Monocytes, Absolute 12/09/2018 0.93  0.20 - 1.20 10*3/mm3 Final   • Eosinophils, Absolute 12/09/2018 0.00  0.00 - 0.70 10*3/mm3 Final   • Basophils, Absolute 12/09/2018 0.00  0.00 - 0.20 10*3/mm3 Final   • Immature Grans, Absolute 12/09/2018 0.04* 0.00 - 0.03 10*3/mm3 Final   • RBC, UA 12/09/2018 6-12* None Seen, 0-2 /HPF Final   • WBC, UA 12/09/2018 0-2  None Seen, 0-2 /HPF Final   • Bacteria, UA 12/09/2018 None Seen  None Seen /HPF Final   • Squamous Epithelial Cells, UA 12/09/2018 0-2  None Seen, 0-2 /HPF Final   • Hyaline Casts, UA 12/09/2018 0-2  None Seen /LPF Final   • Methodology 12/09/2018 Automated Microscopy   Final   • Glucose 12/09/2018 193* 70 - 130 mg/dL Final   • WBC 12/10/2018 8.46  4.50 - 10.70 10*3/mm3 Final   • RBC 12/10/2018 4.12  3.90 - 5.20 10*6/mm3 Final   • Hemoglobin 12/10/2018 12.2  11.9 - 15.5 g/dL Final   • Hematocrit 12/10/2018 39.5  35.6 - 45.5 % Final   • MCV 12/10/2018 95.9  80.5 - 98.2 fL Final   • MCH 12/10/2018 29.6  26.9 - 32.0 pg Final   • MCHC 12/10/2018 30.9* 32.4 - 36.3 g/dL Final   • RDW 12/10/2018 13.0  11.7 - 13.0 % Final   • RDW-SD 12/10/2018 45.2  37.0 - 54.0 fl Final   • MPV 12/10/2018  10.9  6.0 - 12.0 fL Final   • Platelets 12/10/2018 257  140 - 500 10*3/mm3 Final   • Neutrophil % 12/10/2018 71.9  42.7 - 76.0 % Final   • Lymphocyte % 12/10/2018 21.2  19.6 - 45.3 % Final   • Monocyte % 12/10/2018 6.5  5.0 - 12.0 % Final   • Eosinophil % 12/10/2018 0.1* 0.3 - 6.2 % Final   • Basophil % 12/10/2018 0.1  0.0 - 1.5 % Final   • Immature Grans % 12/10/2018 0.2  0.0 - 0.5 % Final   • Neutrophils, Absolute 12/10/2018 6.08  1.90 - 8.10 10*3/mm3 Final   • Lymphocytes, Absolute 12/10/2018 1.79  0.90 - 4.80 10*3/mm3 Final   • Monocytes, Absolute 12/10/2018 0.55  0.20 - 1.20 10*3/mm3 Final   • Eosinophils, Absolute 12/10/2018 0.01  0.00 - 0.70 10*3/mm3 Final   • Basophils, Absolute 12/10/2018 0.01  0.00 - 0.20 10*3/mm3 Final   • Immature Grans, Absolute 12/10/2018 0.02  0.00 - 0.03 10*3/mm3 Final   • Glucose 12/10/2018 182* 65 - 99 mg/dL Final   • BUN 12/10/2018 9  8 - 23 mg/dL Final   • Creatinine 12/10/2018 0.71  0.57 - 1.00 mg/dL Final   • Sodium 12/10/2018 141  136 - 145 mmol/L Final   • Potassium 12/10/2018 3.5  3.5 - 5.2 mmol/L Final   • Chloride 12/10/2018 106  98 - 107 mmol/L Final   • CO2 12/10/2018 21.8* 22.0 - 29.0 mmol/L Final   • Calcium 12/10/2018 9.0  8.6 - 10.5 mg/dL Final   • Total Protein 12/10/2018 6.7  6.0 - 8.5 g/dL Final   • Albumin 12/10/2018 3.40* 3.50 - 5.20 g/dL Final   • ALT (SGPT) 12/10/2018 6  1 - 33 U/L Final   • AST (SGOT) 12/10/2018 14  1 - 32 U/L Final   • Alkaline Phosphatase 12/10/2018 78  39 - 117 U/L Final   • Total Bilirubin 12/10/2018 0.9  0.1 - 1.2 mg/dL Final   • eGFR   Amer 12/10/2018 94  >60 mL/min/1.73 Final   • Globulin 12/10/2018 3.3  gm/dL Final   • A/G Ratio 12/10/2018 1.0  g/dL Final   • BUN/Creatinine Ratio 12/10/2018 12.7  7.0 - 25.0 Final   • Anion Gap 12/10/2018 13.2  mmol/L Final   • Creatine Kinase 12/10/2018 150  20 - 180 U/L Final   • Hemoglobin A1C 12/10/2018 6.90* 4.80 - 5.60 % Final   • Glucose 12/10/2018 180* 70 - 130 mg/dL Final   • ABO Type  12/10/2018 O   Final   • RH type 12/10/2018 Positive   Final   • Antibody Screen 12/10/2018 Negative   Final   • T&S Expiration Date 12/10/2018 12/13/2018 11:59:59 PM   Final   • Glucose 12/10/2018 250* 70 - 130 mg/dL Final     Lab Results (last 24 hours)     Procedure Component Value Units Date/Time    POC Glucose Once [[id number]]  (Abnormal) Collected:  12/10/18 1116    Specimen:  Blood Updated:  12/10/18 1118     Glucose 250 mg/dL     Comprehensive Metabolic Panel [[id number]]  (Abnormal) Collected:  12/10/18 0632    Specimen:  Blood Updated:  12/10/18 0755     Glucose 182 mg/dL      BUN 9 mg/dL      Creatinine 0.71 mg/dL      Sodium 141 mmol/L      Potassium 3.5 mmol/L      Chloride 106 mmol/L      CO2 21.8 mmol/L      Calcium 9.0 mg/dL      Total Protein 6.7 g/dL      Albumin 3.40 g/dL      ALT (SGPT) 6 U/L      AST (SGOT) 14 U/L      Alkaline Phosphatase 78 U/L      Total Bilirubin 0.9 mg/dL      eGFR  African Amer 94 mL/min/1.73      Globulin 3.3 gm/dL      A/G Ratio 1.0 g/dL      BUN/Creatinine Ratio 12.7     Anion Gap 13.2 mmol/L     Narrative:       The MDRD GFR formula is only valid for adults with stable renal function between ages 18 and 70.    CK [[id number]]  (Normal) Collected:  12/10/18 0632    Specimen:  Blood Updated:  12/10/18 0755     Creatine Kinase 150 U/L     Hemoglobin A1c [[id number]]  (Abnormal) Collected:  12/10/18 0632    Specimen:  Blood Updated:  12/10/18 0737     Hemoglobin A1C 6.90 %     Narrative:       Hemoglobin A1C Ranges:    Increased Risk for Diabetes  5.7% to 6.4%  Diabetes                     >= 6.5%  Diabetic Goal                < 7.0%    CBC Auto Differential [[id number]]  (Abnormal) Collected:  12/10/18 0632    Specimen:  Blood Updated:  12/10/18 0729     WBC 8.46 10*3/mm3      RBC 4.12 10*6/mm3      Hemoglobin 12.2 g/dL      Hematocrit 39.5 %      MCV 95.9 fL      MCH 29.6 pg      MCHC 30.9 g/dL      RDW 13.0 %      RDW-SD 45.2 fl      MPV 10.9 fL      Platelets 257 10*3/mm3       Neutrophil % 71.9 %      Lymphocyte % 21.2 %      Monocyte % 6.5 %      Eosinophil % 0.1 %      Basophil % 0.1 %      Immature Grans % 0.2 %      Neutrophils, Absolute 6.08 10*3/mm3      Lymphocytes, Absolute 1.79 10*3/mm3      Monocytes, Absolute 0.55 10*3/mm3      Eosinophils, Absolute 0.01 10*3/mm3      Basophils, Absolute 0.01 10*3/mm3      Immature Grans, Absolute 0.02 10*3/mm3     POC Glucose Once [[id number]]  (Abnormal) Collected:  12/10/18 0613    Specimen:  Blood Updated:  12/10/18 0614     Glucose 180 mg/dL     POC Glucose Once [[id number]]  (Abnormal) Collected:  12/09/18 2248    Specimen:  Blood Updated:  12/09/18 2251     Glucose 193 mg/dL     Protime-INR [[id number]]  (Abnormal) Collected:  12/09/18 2019    Specimen:  Blood Updated:  12/09/18 2044     Protime 15.1 Seconds      INR 1.21    CBC & Differential [[id number]] Collected:  12/09/18 2019    Specimen:  Blood Updated:  12/09/18 2026    Narrative:       The following orders were created for panel order CBC & Differential.  Procedure                               Abnormality         Status                     ---------                               -----------         ------                     CBC Auto Differential[[id number]]        Abnormal            Final result                 Please view results for these tests on the individual orders.    CBC Auto Differential [[id number]]  (Abnormal) Collected:  12/09/18 2019    Specimen:  Blood Updated:  12/09/18 2026     WBC 12.44 10*3/mm3      RBC 4.70 10*6/mm3      Hemoglobin 14.3 g/dL      Hematocrit 45.3 %      MCV 96.4 fL      MCH 30.4 pg      MCHC 31.6 g/dL      RDW 12.9 %      RDW-SD 44.9 fl      MPV 10.5 fL      Platelets 288 10*3/mm3      Neutrophil % 82.5 %      Lymphocyte % 9.7 %      Monocyte % 7.5 %      Eosinophil % 0.0 %      Basophil % 0.0 %      Immature Grans % 0.3 %      Neutrophils, Absolute 10.26 10*3/mm3      Lymphocytes, Absolute 1.21 10*3/mm3      Monocytes, Absolute 0.93 10*3/mm3       Eosinophils, Absolute 0.00 10*3/mm3      Basophils, Absolute 0.00 10*3/mm3      Immature Grans, Absolute 0.04 10*3/mm3     Urinalysis, Microscopic Only - Urine, Catheter [[id number]]  (Abnormal) Collected:  12/09/18 1958    Specimen:  Urine, Catheter Updated:  12/09/18 2012     RBC, UA 6-12 /HPF      WBC, UA 0-2 /HPF      Bacteria, UA None Seen /HPF      Squamous Epithelial Cells, UA 0-2 /HPF      Hyaline Casts, UA 0-2 /LPF      Methodology Automated Microscopy    Urinalysis With Microscopic If Indicated (No Culture) - Urine, Catheter [[id number]]  (Abnormal) Collected:  12/09/18 1958    Specimen:  Urine, Catheter Updated:  12/09/18 2012     Color, UA Yellow     Appearance, UA Clear     pH, UA 7.0     Specific Gravity, UA 1.019     Glucose,  mg/dL (1+)     Ketones, UA 15 mg/dL (1+)     Bilirubin, UA Negative     Blood, UA Small (1+)     Protein, UA Trace     Leuk Esterase, UA Small (1+)     Nitrite, UA Negative     Urobilinogen, UA 1.0 E.U./dL    Comprehensive Metabolic Panel [[id number]]  (Abnormal) Collected:  12/09/18 1931    Specimen:  Blood Updated:  12/09/18 2003     Glucose 218 mg/dL      BUN 10 mg/dL      Creatinine 0.85 mg/dL      Sodium 140 mmol/L      Potassium 4.2 mmol/L      Chloride 101 mmol/L      CO2 21.3 mmol/L      Calcium 9.5 mg/dL      Total Protein 7.8 g/dL      Albumin 4.00 g/dL      ALT (SGPT) 7 U/L      AST (SGOT) 17 U/L      Comment: Specimen hemolyzed.  Results may be affected.        Alkaline Phosphatase 98 U/L      Total Bilirubin 0.7 mg/dL      eGFR  African Amer 76 mL/min/1.73      Globulin 3.8 gm/dL      A/G Ratio 1.1 g/dL      BUN/Creatinine Ratio 11.8     Anion Gap 17.7 mmol/L     Narrative:       The MDRD GFR formula is only valid for adults with stable renal function between ages 18 and 70.    Troponin [[id number]]  (Normal) Collected:  12/09/18 1931    Specimen:  Blood Updated:  12/09/18 2001     Troponin T <0.010 ng/mL     Narrative:       Troponin T Reference Ranges:  Less  than 0.03 ng/mL:    Negative for AMI  0.03 to 0.09 ng/mL:      Indeterminant for AMI  Greater than 0.09 ng/mL: Positive for AMI    CK [[id number]]  (Abnormal) Collected:  12/09/18 1931    Specimen:  Blood Updated:  12/09/18 2001     Creatine Kinase 224 U/L     BNP [[id number]]  (Normal) Collected:  12/09/18 1931    Specimen:  Blood Updated:  12/09/18 1958     proBNP 533.6 pg/mL     Narrative:       Among patients with dyspnea, NT-proBNP is highly sensitive for the detection of acute congestive heart failure. In addition NT-proBNP of <300 pg/ml effectively rules out acute congestive heart failure with 99% negative predictive value.          Imaging:  AP pelvis and lateral views of the left hip are available for review on the [employer] system along with the associated report. The films show an intertrochanteric femur fracture.    Assessment:  Left intertrochanteric femur fracture    Plan:  High a thorough discussion with patient and her son at bedside.  regarding the risks, benefits, alternatives, potential outcomes and complications of non-surgical management versus surgery. I explained that surgical risks include heart attack, stroke, death, pneumonia, bleeding, infection, hematoma, hardware related complications including failure of fixation, loosening, fracture, persistent pain and/or loss of motion, nonunion, malunion, rotational malalignment, length discrepancy, pain necessitating conversion to an arthroplasty, post-operative arthrofibrosis, iatrogenic nerve, tendon, blood vessel injury resulting in permanent weakness, numbness or dysfunction, RSD, DVT, PE, neuropraxia related to positioning, and anesthesia related complications resulting in death. Lastly, we discussed the rehab and all that will be expected of the patient post operatively to ensure an optimal outcome, but no guarantees could be given. The patient and family voiced understanding of the risks, benefits, and alternative forms of treatment that were  discussed and the patient consents to proceed with surgical fixation of the fracture.    She was seen earlier today by one of my partners who is been on call but due to scheduling I will plan on performing her surgery on 12/11/18 at around 4 PM.    Date: 12/10/2018    Jorge Capone MD     CC: Margaret Israel MD

## 2018-12-11 NOTE — PLAN OF CARE
Problem: Patient Care Overview  Goal: Plan of Care Review  Outcome: Ongoing (interventions implemented as appropriate)   12/11/18 0225   Coping/Psychosocial   Plan of Care Reviewed With patient   Plan of Care Review   Progress no change   OTHER   Outcome Summary pt resting in bed quietly. denies pain. pt encouraged to turn. disoriented x3. will continue to monitor.      Goal: Individualization and Mutuality  Outcome: Ongoing (interventions implemented as appropriate)    Goal: Discharge Needs Assessment  Outcome: Ongoing (interventions implemented as appropriate)      Problem: Fall Risk (Adult)  Goal: Absence of Fall  Outcome: Ongoing (interventions implemented as appropriate)      Problem: Skin Injury Risk (Adult)  Goal: Skin Health and Integrity  Outcome: Ongoing (interventions implemented as appropriate)

## 2018-12-11 NOTE — PROGRESS NOTES
"DAILY PROGRESS NOTE  [facility]    Patient Identification:  Name: Sienna Ortiz  Age: 89 y.o.  Sex: female  :  3/4/1929  MRN: [id number]         Primary Care Physician: Sean Mayer MD      Subjective  No c/o but poor historian.     Objective:  General Appearance:  Comfortable, well-appearing, in no acute distress and not in pain.    Vital signs: (most recent): Blood pressure 138/98, pulse 112, temperature 97.5 °F (36.4 °C), temperature source Oral, resp. rate 12, height 160 cm (63\"), weight 68 kg (150 lb), SpO2 91 %, not currently breastfeeding.    Lungs:  Normal effort and normal respiratory rate.  Breath sounds clear to auscultation.    Heart: Tachycardia.  (Frequent ectopy. )  Extremities: There is no dependent edema.    Neurological: Patient is alert.  (Oriented to person. ).    Skin:  Warm and dry.                Vital signs in last 24 hours:  Temp:  [97.5 °F (36.4 °C)-99 °F (37.2 °C)] 97.5 °F (36.4 °C)  Heart Rate:  [] 112  Resp:  [12-18] 12  BP: (112-186)/() 138/98    Intake/Output:    Intake/Output Summary (Last 24 hours) at 2018 1841  Last data filed at 2018 1700  Gross per 24 hour   Intake 800 ml   Output 2600 ml   Net -1800 ml         Results from last 7 days   Lab Units  18   0630  12/10/18   0632  18   WBC 10*3/mm3  9.56  8.46  12.44*   HEMOGLOBIN g/dL  13.4  12.2  14.3   PLATELETS 10*3/mm3  217  257  288     Results from last 7 days   Lab Units  18   0630  12/10/18   0632  18   1931   SODIUM mmol/L  142  141  140   POTASSIUM mmol/L  3.1*  3.5  4.2   CHLORIDE mmol/L  105  106  101   CO2 mmol/L  22.0  21.8*  21.3*   BUN mg/dL  6*  9  10   CREATININE mg/dL  0.59  0.71  0.85   GLUCOSE mg/dL  187*  182*  218*   Estimated Creatinine Clearance: 44.1 mL/min (by C-G formula based on SCr of 0.59 mg/dL).  Results from last 7 days   Lab Units  18   0630  12/10/18   0632  18   1931   CALCIUM mg/dL  8.9  9.0  9.5   ALBUMIN " g/dL   --   3.40*  4.00     Results from last 7 days   Lab Units  12/10/18   0632  12/09/18   1931   ALBUMIN g/dL  3.40*  4.00   BILIRUBIN mg/dL  0.9  0.7   ALK PHOS U/L  78  98   AST (SGOT) U/L  14  17   ALT (SGPT) U/L  6  7       Assessment:  Closed 2-part intertrochanteric fracture of left femur:  s/p ORIF 12/11/18     Dementia    Type II diabetes mellitus:  Cont SSI.  Hold Glucophage.     Benign essential hypertension w sinus tach:  Also had a few runs that appear to be MAT.   Low dose lopressor started yesterday.  Will increase dose.  Watch for possible A fib.  Discussed w RN and monitor tech.     Hypothyroidism    Slow transit constipation    Fall        Plan:  Please see above.     Cresencio Qureshi MD  12/11/2018  6:41 PM

## 2018-12-11 NOTE — PLAN OF CARE
Problem: Patient Care Overview  Goal: Plan of Care Review  Outcome: Ongoing (interventions implemented as appropriate)   12/11/18 1511   Coping/Psychosocial   Plan of Care Reviewed With patient   Plan of Care Review   Progress no change   OTHER   Outcome Summary patient left for surgery around 1330, patients VSS, room air, disoriented x3, will re-access when patient arrives back to the floor.

## 2018-12-11 NOTE — ANESTHESIA PROCEDURE NOTES
ANESTHESIA INTUBATION  Urgency: elective    Airway not difficult    General Information and Staff    Patient location during procedure: OR  Anesthesiologist: Mejia Chambers MD    Indications and Patient Condition  Indications for airway management: airway protection    Preoxygenated: yes  MILS maintained throughout  Mask difficulty assessment: 1 - vent by mask    Final Airway Details  Final airway type: endotracheal airway      Successful airway: ETT  Cuffed: yes   Successful intubation technique: direct laryngoscopy  Endotracheal tube insertion site: oral  Blade: Marco  Blade size: 3  ETT size (mm): 7.0  Cormack-Lehane Classification: grade IIa - partial view of glottis  Placement verified by: capnometry   Measured from: teeth  Number of attempts at approach: 1

## 2018-12-11 NOTE — ANESTHESIA PREPROCEDURE EVALUATION
Anesthesia Evaluation     Patient summary reviewed and Nursing notes reviewed   no history of anesthetic complications:  NPO Solid Status: > 8 hours  NPO Liquid Status: > 8 hours           Airway   Mallampati: II  TM distance: >3 FB  Neck ROM: full  Dental    (+) lower dentures and upper dentures    Pulmonary - negative pulmonary ROS and normal exam    breath sounds clear to auscultation  Cardiovascular     ECG reviewed  Rhythm: irregular  Rate: normal    (+) hypertension, hyperlipidemia,       Neuro/Psych  (+) psychiatric history, dementia,       ROS Comment: Cerebellar ataxia  GI/Hepatic/Renal/Endo    (+)   diabetes mellitus type 2, hypothyroidism,     Musculoskeletal (-) negative ROS    Abdominal  - normal exam   Substance History - negative use     OB/GYN negative ob/gyn ROS         Other - negative ROS                       Anesthesia Plan    ASA 3     general     intravenous induction   Anesthetic plan, all risks, benefits, and alternatives have been provided, discussed and informed consent has been obtained with: patient.

## 2018-12-11 NOTE — ANESTHESIA POSTPROCEDURE EVALUATION
Patient: Sienna Ortiz    Procedure Summary     Date:  12/11/18 Room / Location:  [facility] OR [address] [place] [address] MAIN OR    Anesthesia Start:  1525 Anesthesia Stop:  1651    Procedure:  HIP TROCANTERIC NAILING LONG WITH INTRAMEDULLARY HIP SCREW (Left Thigh) Diagnosis:      Surgeon:  Jorge Capone MD Provider:  Mejia Chambers MD    Anesthesia Type:  general ASA Status:  3          Anesthesia Type: general  Last vitals  BP   147/92 (12/11/18 1725)   Temp   36.7 °C (98 °F) (12/11/18 1650)   Pulse   109 (12/11/18 1725)   Resp   16 (12/11/18 1725)     SpO2   94 % (12/11/18 1725)     Post Anesthesia Care and Evaluation    Patient location during evaluation: bedside  Patient participation: complete - patient participated  Level of consciousness: awake  Pain score: 1  Pain management: adequate  Airway patency: patent  Anesthetic complications: No anesthetic complications    Cardiovascular status: acceptable  Respiratory status: acceptable  Hydration status: acceptable    Comments: --------------------            12/11/18 1725     --------------------   BP:       147/92     Pulse:     109       Resp:       16       Temp:                SpO2:      94%      --------------------

## 2018-12-11 NOTE — BRIEF OP NOTE
FEMUR INTRAMEDULLARY NAILING/RODDING  Progress Note    Sienna SALAS Angel  12/9/2018 - 12/11/2018    Pre-op Diagnosis:   Left intertrochanteric hip fracture       Post-Op Diagnosis Codes:Left intertrochanteric hip fracture    Procedure/CPT® Codes:      Procedure(s):  HIP TROCANTERIC NAILING LONG WITH INTRAMEDULLARY HIP SCREW    Surgeon(s):  Jorge Capone MD    Anesthesia: General    Staff:   Circulator: Mariia Diane RN; Kamille Christianson RN  Radiology Technologist: Harish Sauceda RRT  Scrub Person: Jesenia Youssef; Jocelyne Chen  Vendor Representative: Michoacano Nichols    Estimated Blood Loss: 200 mL    Urine Voided: * No values recorded between 12/11/2018  3:23 PM and 12/11/2018  4:48 PM *    Specimens:                None      Drains:  None         Findings: see dict    Complications: none      Jorge Capone MD     Date: 12/11/2018  Time: 4:50 PM

## 2018-12-12 NOTE — PLAN OF CARE
Problem: Patient Care Overview  Goal: Plan of Care Review  Outcome: Ongoing (interventions implemented as appropriate)   12/12/18 0310   Coping/Psychosocial   Plan of Care Reviewed With patient   Plan of Care Review   Progress no change   OTHER   Outcome Summary pt resting in bed. denies pain but grunts and cries out with movement and turns. pt encouraged to sit on side of bed. did not tolerate for very long even with morphine given prior. pt turned every 2 hours. incontinence care as needed. purewick applied. fluids runnings. urine appears concentrated. pt drinking and eating well. remains disoriented x3. dressing to left hip clean, dry and intact. will continue to monitor.      Goal: Individualization and Mutuality  Outcome: Ongoing (interventions implemented as appropriate)    Goal: Discharge Needs Assessment  Outcome: Ongoing (interventions implemented as appropriate)      Problem: Fall Risk (Adult)  Goal: Absence of Fall  Outcome: Ongoing (interventions implemented as appropriate)      Problem: Skin Injury Risk (Adult)  Goal: Skin Health and Integrity  Outcome: Ongoing (interventions implemented as appropriate)

## 2018-12-12 NOTE — THERAPY EVALUATION
Acute Care - Physical Therapy Initial Evaluation  [facility]     Patient Name: Sienna Ortiz  : 3/4/1929  MRN: [id number]  Today's Date: 2018   Onset of Illness/Injury or Date of Surgery: 18            Admit Date: 2018    Visit Dx:     ICD-10-CM ICD-9-CM   1. Fall, initial encounter W19.XXXA E888.9   2. Closed fracture of left hip, initial encounter (CMS/formerly [facility]) S72.002A 820.8   3. Essential hypertension I10 401.9   4. 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formerly [facility])   • Dementia   • Type II diabetes mellitus (CMS/formerly [facility])   • Hyperlipidemia   • Benign essential hypertension   • Hypothyroidism   • Vitamin D deficiency   • Closed fracture of right hip with routine healing   • Slow transit constipation   • Fall   • Closed 2-part intertrochanteric fracture of left femur (CMS/formerly [facility])     Past Medical History:   Diagnosis Date   • Anemia, pernicious 10/12/2014   • Benign essential hypertension 10/12/2014   • Cerebellar ataxia (CMS/formerly [facility]) 10/22/2013   • Dementia 2014   • Hyperlipidemia    • Hypothyroidism    • Type II diabetes mellitus (CMS/formerly [facility])    • Vitamin D deficiency 2011    unspecified     Past Surgical History:   Procedure Laterality Date   • REPLACEMENT TOTAL HIP LATERAL POSITION Right         PT ASSESSMENT (last 12 hours)      Physical Therapy Evaluation     Row Name 18 1134          PT Evaluation Time/Intention    Subjective Information  complains of;pain  -EM     Document Type  evaluation  -EM     Mode of Treatment  physical therapy  -EM     Patient Effort  adequate  -EM     Row Name 18 1134          General Information    Onset of Illness/Injury or Date of Surgery  18  -EM     Patient Observations  alert;cooperative;agree to therapy  -EM     General Observations of Patient  elderly  female in supine, sleeping but arousable   -EM     Prior Level of Function   independent:;all household mobility  -EM     Equipment Currently Used at Home  cane, straight  -EM     Pertinent History of Current Functional Problem  L femur fx, s/p intramedullary nailing (pt with dementia)   -EM     Existing Precautions/Restrictions  fall  -EM     Row Name 12/12/18 1134          Relationship/Environment    Lives With  child(monica), adult  -EM     Row Name 12/12/18 1134          Resource/Environmental Concerns    Current Living Arrangements  home/apartment/condo  -     Row Name 12/12/18 1134          Cognitive Assessment/Intervention- PT/OT    Orientation Status (Cognition)  oriented to;person;place  -EM     Follows Commands (Cognition)  follows one step commands;75-90% accuracy;delayed response/completion  -EM     Row Name 12/12/18 1134          Mobility Assessment/Treatment    Extremity Weight-bearing Status  left lower extremity  -EM     Left Lower Extremity (Weight-bearing Status)  weight-bearing as tolerated (WBAT)  -     Row Name 12/12/18 1134          Bed Mobility Assessment/Treatment    Bed Mobility Assessment/Treatment  supine-sit;sit-supine  -EM     Supine-Sit Graham (Bed Mobility)  moderate assist (50% patient effort);2 person assist  -EM     Assistive Device (Bed Mobility)  head of bed elevated;draw sheet  -     Row Name 12/12/18 1134          Transfer Assessment/Treatment    Transfer Assessment/Treatment  sit-stand transfer;stand-sit transfer  -EM     Sit-Stand Graham (Transfers)  moderate assist (50% patient effort)  -EM     Stand-Sit Graham (Transfers)  moderate assist (50% patient effort)  -     Row Name 12/12/18 1134          Sit-Stand Transfer    Assistive Device (Sit-Stand Transfers)  walker, front-wheeled  -     Row Name 12/12/18 1134          Stand-Sit Transfer    Assistive Device (Stand-Sit Transfers)  walker, front-wheeled  -[facility] Name 12/12/18 1134          Gait/Stairs Assessment/Training    [place]raham Level (Gait)  moderate assist (50%  patient effort);2 person assist  -EM     Assistive Device (Gait)  walker, front-wheeled  -EM     Distance in Feet (Gait)  2  -EM     Deviations/Abnormal Patterns (Gait)  antalgic  -EM     Comment (Gait/Stairs)  pt took 2-3 short steps around from bed to chair, cues for sequencing and safety awareness   -EM     Row Name 12/12/18 1134          General ROM    GENERAL ROM COMMENTS  ROM WNL   -EM     Row Name 12/12/18 1134          MMT (Manual Muscle Testing)    General MMT Comments  post op weakness and guarding in LLE   -EM     Row Name 12/12/18 1134          Motor Assessment/Intervention    Additional Documentation  Therapeutic Exercise (Group);Therapeutic Exercise Interventions (Group)  -EM     Row Name 12/12/18 1134          Therapeutic Exercise    Comment (Therapeutic Exercise)  10 reps per hip protocol, assist to complete, difficulty understanding isometric ex   -EM     Row Name 12/12/18 1134          Pain Assessment    Additional Documentation  Pain Scale: Word Pre/Post-Treatment (Group)  -EM     Row Name 12/12/18 1134          Pain Scale: Numbers Pre/Post-Treatment    Pain Location - Side  Left  -EM     Pain Location  hip  -EM     Pain Intervention(s)  Medication (See MAR);Repositioned  -EM     Row Name 12/12/18 1134          Pain Scale: Word Pre/Post-Treatment    Pain: Word Scale, Pretreatment  4 - moderate pain  -EM     Row Name             Wound 12/11/18 1600 Left hip incision    Wound - Properties Group Date first assessed: 12/11/18  -MB Time first assessed: 1600  -MB Side: Left  -MB Location: hip  -MB Type: incision  -MB    Row Name 12/12/18 1134          Plan of Care Review    Plan of Care Reviewed With  patient  -EM     Row Name 12/12/18 1134          Physical Therapy Clinical Impression    Patient/Family Goals Statement (PT Clinical Impression)  get stronger   -EM     Criteria for Skilled Interventions Met (PT Clinical Impression)  yes;treatment indicated  -EM     Rehab Potential (PT Clinical Summary)   good, to achieve stated therapy goals  -EM     Row Name 12/12/18 1134          Physical Therapy Goals    Bed Mobility Goal Selection (PT)  bed mobility, PT goal 1  -EM     Transfer Goal Selection (PT)  transfer, PT goal 1  -EM     Gait Training Goal Selection (PT)  gait training, PT goal 1  -EM     Row Name 12/12/18 1134          Bed Mobility Goal 1 (PT)    Activity/Assistive Device (Bed Mobility Goal 1, PT)  bed mobility activities, all  -EM     [place] Level/Cues Needed (Bed Mobility Goal 1, PT)  minimum assist (75% or more patient effort)  -EM     Time Frame (Bed Mobility Goal 1, PT)  5 days  -EM     Row Name 12/12/18 1134          Transfer Goal 1 (PT)    Activity/Assistive Device (Transfer Goal 1, PT)  sit-to-stand/stand-to-sit;walker, rolling  -EM     [place] Level/Cues Needed (Transfer Goal 1, PT)  minimum assist (75% or more patient effort)  -EM     Time Frame (Transfer Goal 1, PT)  5 days  -EM     Row Name 12/12/18 1134          Gait Training Goal 1 (PT)    Activity/Assistive Device (Gait Training Goal 1, PT)  walker, rolling  -EM     [place] Level (Gait Training Goal 1, PT)  moderate assist (50-74% patient effort)  -EM     Distance (Gait Goal 1, PT)  20  -EM     Time Frame (Gait Training Goal 1, PT)  5 days  -EM     Row Name 12/12/18 1134          Positioning and Restraints    Pre-Treatment Position  in bed  -EM     Post Treatment Position  chair  -EM     In Chair  reclined;call light within reach;exit alarm on;notified nsg  -EM       User Key  (r) = Recorded By, (t) = Taken By, (c) = Cosigned By    Initials Name Provider Type    Mariia Camilo, RN Registered Nurse    Danya Membreno PT Physical Therapist        Physical Therapy Education     Title: PT OT SLP Therapies (In Progress)     Topic: Physical Therapy (In Progress)     Point: Mobility training (In Progress)     Learning Progress Summary           Patient Acceptance, E, NR by GULSHAN at 12/12/2018 11:42 AM                    Point: Home exercise program (In Progress)     Learning Progress Summary           Patient Acceptance, E, NR by EM at 12/12/2018 11:42 AM                               User Key     Initials Effective Dates Name Provider Type Discipline     04/03/18 -  Danya Monae PT Physical Therapist PT              PT Recommendation and Plan  Anticipated Discharge Disposition (PT): skilled nursing facility  Planned Therapy Interventions (PT Eval): bed mobility training, gait training, home exercise program, transfer training  Therapy Frequency (PT Clinical Impression): daily  Outcome Summary/Treatment Plan (PT)  Anticipated Discharge Disposition (PT): skilled nursing facility  Plan of Care Reviewed With: patient  Outcome Summary: patient presents with generalized post op weakness and pain, decreased activity tolerance, decreased safety awareness which limit independence with functional mobility and patient would benefit from skilled PT. Patient lives with family, ambualtes with [facility] prior to admission. Anticipate patient will need SNU at d/c.   Outcome Measures     Row Name 12/12/18 1100             How much help from another person do you currently need...    Turning from your back to your side while in flat bed without using bedrails?  2  -EM      Moving from lying on back to sitting on the side of a flat bed without bedrails?  2  -EM      Moving to and from a bed to a chair (including a wheelchair)?  2  -EM      Standing up from a chair using your arms (e.g., wheelchair, bedside chair)?  2  -EM      Climbing 3-5 steps with a railing?  1  -EM      To walk in hospital room?  1  -EM      AM-PAC 6 Clicks Score  10  -EM        User Key  (r) = Recorded By, (t) = Taken By, (c) = Cosigned By    Initials Name Provider Type    EM Danya Monae PT Physical Therapist         Time Calculation:   PT Charges     Row Name 12/12/18 1144             Time Calculation    Start Time  1055  -EM      Stop Time  1128  -EM      Time  Calculation (min)  33 min  -EM      PT Received On  12/12/18  -EM      PT - Next Appointment  12/13/18  -EM      PT Goal Re-Cert Due Date  12/17/18  -EM         Time Calculation- PT    Total Timed Code Minutes- PT  15 minute(s)  -EM        User Key  (r) = Recorded By, (t) = Taken By, (c) = Cosigned By    Initials Name Provider Type    EM Danya Monae PT Physical Therapist        Therapy Suggested Charges     Code   Minutes Charges    None           Therapy Charges for Today     Code Description Service Date Service Provider Modifiers Qty    32007479403 HC PT EVAL MOD COMPLEXITY 2 12/12/2018 Danya Monae, PT GP 1    64438701991 HC PT THER PROC EA 15 MIN 12/12/2018 Danya Monae, PT GP 1    65074211672 HC PT THER SUPP EA 15 MIN 12/12/2018 Danya Monae, PT GP 1          PT G-Codes  AM-PAC 6 Clicks Score: 10      Danya Monae PT  12/12/2018

## 2018-12-12 NOTE — OP NOTE
Operative Note      Facility: [facility]  Patient Name: Sienna Ortiz  YOB: 1929  Date: 12/11/18  Medical Record Number: [id number]      Pre-op Diagnosis: 1.  Left hip intertrochanteric fracture       Post-op Diagnosis: 1.  Left hip intertrochanteric fracture      Procedure(s): Left intertrochanteric hip fracture fixation using Synthes TFNA measuring 380 mm x 10 mm with 85 mm compression screw and distal locking screw     Surgeon(s):  Jorge Capone MD    Anesthesia: General  Anesthesiologist: Mejia Chambers MD    Staff:   Circulator: Mariia Diane RN; Kamille Christianson RN  Radiology Technologist: Harish Sauceda RRT  Scrub Person: Jesenia Youssef; Jocelyne Chen  Vendor Representative: Michoacano Nichols  Assistants : none      Tourniquet time: None used        Estimated Blood Loss:  200 mL  Drains: None  Specimens: * No orders in the log *  Findings: See Dictation    Complications: None      Indications for Procedure: Patient is 2 days status post fall with left intertrochanteric hip fracture.  She's undergone preoperative medical evaluation and I discussed the operative treatment with she and her son with associated risks benefits potential outcomes and complications with decision made to proceed with operative treatment.          Description of Procedure: Preoperative informed consent and anesthesia evaluation were obtained.  IV antibiotics were administered in the surgical site was marked.  Patient was brought to the operating room placed in supine position.  Anesthesia was induced and she was intubated.  She was then placed on the HANA table over well-padded perineal post.  Left leg was secured in a well-padded traction boot after reduction maneuver.  Right hip was gently flexed and external rotated with care taken to protect the previous hip arthroplasty and position and a carefully well-padded leg roy.    Gentle traction and internal rotation were placed on  the left leg and x-rays showed acceptable alignment in AP and lateral views.    Left hip and leg were prepped and draped in sterile fashion and surgical timeout was performed.  The tip of the greater trochanter was identified clinically and radiographically an incision was made proximal to this.  Full-thickness flaps were elevated and fascia was divided in line with the incision.  The tip of the greater trochanter was identified and the entry awl was carefully positioned in AP and lateral views and taken down to the greater trochanter.  Guidewire was then passed through this and down to the distal femur or was found to be well contained.  This was measured and a 380 mm nail with 125° neck angle was selected.  The femur was then sequentially reamed up to 12 mm with good chatter above 10 mm.  The nail was then passed without difficulty and appropriately aligned rotationally.  Next the targeting guide was used to place the guidepin for the compression screw through separate incision.  This was found to be well contained on AP and lateral views.  This was measured and appropriately reamed and 85 mm compression screw was then placed with good purchase in the femoral head.  X-ray showed appropriate containment and the setscrew was then deployed.  AP and lateral views showed good alignment and containment of the fracture and hardware.    X-rays of the knee showed the nail to be well contained in a somewhat posterior position.  I then placed a distal locking screw through a stab incision in the oblong hole and this was found to be well contained.    X-rays showed good alignment and containment distally.    Wounds were irrigated copiously with bacitracin irrigation the fascia was then closed with #1 Vicryl suture and subcutaneous tissues were closed deeply with 0 Vicryl and skin was closed over the 2 proximal incisions with 2-0 Vicryl and staples.  The distal wound was closed with staples.  Sterile dressings were applied.   She was awakened and transferred to stretcher and taken to recovery in stable condition.  thigh compartments were soft

## 2018-12-12 NOTE — PROGRESS NOTES
Continued Stay Note  [facility]     Patient Name: Sienna Ortiz  MRN: [id number]  Today's Date: 12/12/2018    Admit Date: 12/9/2018    Discharge Plan     Row Name 12/12/18 1352       Plan    Plan  Plan [facility] for skilled care based on bed availability and pre cert.   LILIAN Mott    Patient/Family in Agreement with Plan  yes    Plan Comments  Spoke with pt at bedside.  Pt gave permission to call her family.  Called pt's son ( Abiel Ortiz [phone]) and he requested a facility close to [facility].  Pt's son agreeable to [facility] as first choice.  Florina ([phone]) called to follow.   Pt's second choice is [facility].  Lorie ([phone])called  to follow for [facility].  Plan [facility] for skilled care based on bed availability and pre cert.  Tiera, RN        Discharge Codes    No documentation.             Eileen Crenshaw, RN

## 2018-12-12 NOTE — PLAN OF CARE
Problem: Patient Care Overview  Goal: Plan of Care Review  Outcome: Ongoing (interventions implemented as appropriate)   12/12/18 8759   Coping/Psychosocial   Plan of Care Reviewed With patient   OTHER   Outcome Summary pt denies pain, sat up in chair for several hours, only oriented to person, given ivf, vss, will ctm.     Goal: Individualization and Mutuality  Outcome: Ongoing (interventions implemented as appropriate)    Goal: Discharge Needs Assessment  Outcome: Ongoing (interventions implemented as appropriate)    Goal: Interprofessional Rounds/Family Conf  Outcome: Ongoing (interventions implemented as appropriate)      Problem: Fall Risk (Adult)  Goal: Absence of Fall  Outcome: Ongoing (interventions implemented as appropriate)      Problem: Skin Injury Risk (Adult)  Goal: Skin Health and Integrity  Outcome: Ongoing (interventions implemented as appropriate)

## 2018-12-12 NOTE — PLAN OF CARE
Problem: Patient Care Overview  Goal: Plan of Care Review   12/12/18 1142   OTHER   Outcome Summary patient presents with generalized post op weakness and pain, decreased activity tolerance, decreased safety awareness which limit independence with functional mobility and patient would benefit from skilled PT. Patient lives with family, ambualtes with [facility] prior to admission. Anticipate patient will need SNU at d/c.

## 2018-12-12 NOTE — PROGRESS NOTES
Name: Sienna Ortiz ADMIT: 2018   : 3/4/1929  PCP: Sean Mayer MD    MRN: [id number] LOS: 3 days   AGE/SEX: 89 y.o. female  ROOM: E660/1   Subjective   Reports no pain to me currently. Denies CP SOA NVD and palpitations. Cries out intermittently with movement per nursing reports.    Objective   Vital Signs  Temp:  [97.5 °F (36.4 °C)-99 °F (37.2 °C)] 97.5 °F (36.4 °C)  Heart Rate:  [] 92  Resp:  [12-18] 18  BP: ()/() 118/73  SpO2:  [90 %-99 %] 94 %  on  Flow (L/min):  [2-4] 2;   Device (Oxygen Therapy): room air  Body mass index is 26.57 kg/m².    Physical Exam   Constitutional: She appears well-developed. No distress.   frail   HENT:   Head: Atraumatic.   Nose: Nose normal.   Eyes: Conjunctivae and EOM are normal.   Neck: Normal range of motion. Neck supple.   Cardiovascular: Normal rate, regular rhythm and intact distal pulses.   Pulmonary/Chest: Effort normal. She has decreased breath sounds. She has no wheezes.   Abdominal: Soft. She exhibits no distension. There is no tenderness.   Musculoskeletal: She exhibits tenderness. She exhibits no edema.   Neurological: She is alert. No cranial nerve deficit.   Skin: Skin is warm and dry. She is not diaphoretic.   Psychiatric: She has a normal mood and affect. Her behavior is normal. Cognition and memory are impaired.   Nursing note and vitals reviewed.      Results Review:       I reviewed the patient's new clinical results.     I reviewed imaging, agree with interpretation.     I reviewed telemetry/EKG results, sinus tachycardia, prolonged qtc, non-specific st change. Telemetry with improved rate to 80-90s.      Results from last 7 days   Lab Units  12/12/18   0628  12/11/18   0630  12/10/18   0632  12/09/18   2019   WBC 10*3/mm3  11.97*  9.56  8.46  12.44*   HEMOGLOBIN g/dL  10.0*  13.4  12.2  14.3   PLATELETS 10*3/mm3  200  217  257  288     Results from last 7 days   Lab Units  18   0628  18   0630  12/10/18   0632   12/09/18   1931   SODIUM mmol/L  141  142  141  140   POTASSIUM mmol/L  3.5  3.1*  3.5  4.2   CHLORIDE mmol/L  105  105  106  101   CO2 mmol/L  21.7*  22.0  21.8*  21.3*   BUN mg/dL  14  6*  9  10   CREATININE mg/dL  0.87  0.59  0.71  0.85   GLUCOSE mg/dL  253*  187*  182*  218*   Estimated Creatinine Clearance: 40.6 mL/min (by C-G formula based on SCr of 0.87 mg/dL).  Results from last 7 days   Lab Units  12/12/18   0628  12/11/18   0630  12/10/18   0632  12/09/18   1931   CALCIUM mg/dL  8.3*  8.9  9.0  9.5   ALBUMIN g/dL   --    --   3.40*  4.00   MAGNESIUM mg/dL  2.0   --    --    --          aspirin 325 mg Oral Daily   atorvastatin 10 mg Oral Daily   docusate sodium 100 mg Oral BID   donepezil 10 mg Oral Nightly   insulin lispro 0-9 Units Subcutaneous 4x Daily With Meals & Nightly   levothyroxine 75 mcg Oral Daily   metFORMIN 500 mg Oral TID   metoprolol tartrate 25 mg Oral Q12H   pantoprazole 40 mg Oral QAM   sodium chloride 3 mL Intravenous Q12H   sodium chloride 3 mL Intravenous Q12H       lactated ringers 9 mL/hr Last Rate: 9 mL/hr (12/11/18 1434)   lactated ringers 75 mL/hr Last Rate: 75 mL/hr (12/11/18 2305)   sodium chloride 125 mL/hr Last Rate: 125 mL/hr (12/10/18 2046)   Diet Regular; Consistent Carbohydrate, Renal, Cardiac      Assessment/Plan      Active Hospital Problems    Diagnosis Date Noted   • **Closed 2-part intertrochanteric fracture of left femur (CMS/HCC) [S72.142A] 12/09/2018   • Fall [W19.XXXA] 12/09/2018   • Slow transit constipation [K59.01] 11/04/2015   • Type II diabetes mellitus (CMS/HCC) [E11.9]    • Hypothyroidism [E03.9]    • Benign essential hypertension [I10] 10/12/2014   • Dementia [F03.90] 02/19/2014      Resolved Hospital Problems   No resolved problems to display.     - L Femur Fx: sp orif 12/11/18. Continue pain control. PT. ASA ppx per ortho. Orthopedic surgery following.  - Fall  - Constipation: Continue bowel regimen  - DM2: A1c 6.9. SSI.  - HTN: Monitor  - Tachycardia:  Improved rate with increased BBlocker. TSH ok. Checked Magnesium due to QT and it is ok.  - Dementia  - Dispostion: TBD    >35 minutes time spent    Camron Mcmahon MD  [facility]  12/12/18  10:17 AM

## 2018-12-12 NOTE — DISCHARGE PLACEMENT REQUEST
"Maine Ortiz (89 y.o. Female)     Date of Birth Social Security Number Address Home Phone MRN    03/04/1929  [id number] [id number] [phone] [id number]    Amish Marital Status          None Single       Admission Date Admission Type Admitting Provider Attending Provider Department, Room/Bed    12/9/18 Emergency Margaret Israel MD Baumann, Patrick D, MD [address], E660/1    Discharge Date Discharge Disposition Discharge Destination                       Attending Provider:  Camron Mcmahon MD    Allergies:  No Known Allergies    Isolation:  None   Infection:  None   Code Status:  No CPR    Ht:  160 cm (63\")   Wt:  68 kg (150 lb)    Admission Cmt:  None   Principal Problem:  Closed 2-part intertrochanteric fracture of left femur (CMS/formerly [facility]) [S72.142A]                 Active Insurance as of 12/9/2018     Primary Coverage     Payor Plan Insurance Group Employer/Plan Group    HUMANA MEDICARE REPLACEMENT HUMANA MEDICARE REPL [id number]     Payor Plan Address Payor Plan Phone Number Payor Plan Fax Number Effective Dates    [address] [address] [phone]  1/1/2015 - None Entered    [facility] [id number]       Subscriber Name Subscriber Birth Date Member ID       MAINE ORTIZ 3/4/1929 [id number]                 Emergency Contacts      (Rel.) Home Phone Work Phone Mobile Phone    EBONY ORTIZ (Son) [phone] -- [phone]              "

## 2018-12-12 NOTE — PROGRESS NOTES
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[facility])   • Dementia   • Type II diabetes mellitus (CMS/[facility])   • Hyperlipidemia   • Benign essential hypertension   • Hypothyroidism   • Vitamin D deficiency   • Closed fracture of right hip with routine healing   • Slow transit constipation   • Fall   • Closed 2-part intertrochanteric fracture of left femur (CMS/[facility])         Past Medical History:   Diagnosis Date   • Anemia, pernicious 10/12/2014   • Benign essential hypertension 10/12/2014   • 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  female    OBJECTIVE:   Vitals:    12/11/18 2207 12/11/18 2308 12/12/18 0321 12/12/18 0724   BP:  130/70 92/70 118/73   BP Location:  Left arm Right arm Right arm   Patient Position:  Lying Lying Lying   Pulse: 119 118 107 92   Resp:  16 16 18   Temp:  98.7 °F (37.1 °C) 98.5 °F (36.9 °C) 97.5 °F (36.4 °C)   TempSrc:  Oral Oral Oral   SpO2: 92% 92% 95% 94%   Weight:       Height:         I/O last 3 completed shifts:  In: 1160 [P.O.:360; I.V.:800]  Out: 2700 [Urine:2500; Blood:200]    Current Medications:  Scheduled Meds:  aspirin 325 mg Oral Daily   atorvastatin 10 mg Oral Daily   docusate sodium 100 mg Oral BID   donepezil 10 mg Oral Nightly   insulin lispro 0-9 Units Subcutaneous 4x Daily With Meals & Nightly    levothyroxine 75 mcg Oral Daily   metFORMIN 500 mg Oral TID   metoprolol tartrate 25 mg Oral Q12H   pantoprazole 40 mg Oral QAM   sodium chloride 3 mL Intravenous Q12H   sodium chloride 3 mL Intravenous Q12H     PRN Meds:.•  acetaminophen  •  acetaminophen  •  bisacodyl  •  bisacodyl  •  dextrose  •  dextrose  •  glucagon (human recombinant)  •  HYDROcodone-acetaminophen  •  HYDROcodone-acetaminophen  •  magnesium hydroxide  •  melatonin  •  Morphine  •  Morphine **AND** naloxone  •  [COMPLETED] Insert peripheral IV **AND** sodium chloride  •  sodium chloride  •  sodium chloride    Diagnostic Tests:   Lab Results (last 24 hours)     Procedure Component Value Units Date/Time    POC Glucose Once [[id number]]  (Abnormal) Collected:  12/12/18 1101    Specimen:  Blood Updated:  12/12/18 1103     Glucose 212 mg/dL     Magnesium [[id number]]  (Normal) Collected:  12/12/18 0628    Specimen:  Blood Updated:  12/12/18 0942     Magnesium 2.0 mg/dL     CBC & Differential [[id number]] Collected:  12/12/18 0628    Specimen:  Blood Updated:  12/12/18 0828    Narrative:       The following orders were created for panel order CBC & Differential.  Procedure                               Abnormality         Status                     ---------                               -----------         ------                     CBC Auto Differential[[id number]]        Abnormal            Final result                 Please view results for these tests on the individual orders.    CBC Auto Differential [[id number]]  (Abnormal) Collected:  12/12/18 0628    Specimen:  Blood Updated:  12/12/18 0828     WBC 11.97 10*3/mm3      RBC 3.38 10*6/mm3      Hemoglobin 10.0 g/dL      Hematocrit 30.9 %      MCV 91.4 fL      MCH 29.6 pg      MCHC 32.4 g/dL      RDW 13.1 %      RDW-SD 43.8 fl      MPV 11.5 fL      Platelets 200 10*3/mm3      Neutrophil % 83.7 %      Lymphocyte % 10.4 %      Monocyte % 5.8 %      Eosinophil % 0.0 %      Basophil % 0.1 %       Immature Grans % 0.4 %      Neutrophils, Absolute 10.02 10*3/mm3      Lymphocytes, Absolute 1.24 10*3/mm3      Monocytes, Absolute 0.70 10*3/mm3      Eosinophils, Absolute 0.00 10*3/mm3      Basophils, Absolute 0.01 10*3/mm3      Immature Grans, Absolute 0.05 10*3/mm3     TSH [[id number]]  (Normal) Collected:  12/12/18 0628    Specimen:  Blood Updated:  12/12/18 0726     TSH 2.220 mIU/mL     Basic Metabolic Panel [[id number]]  (Abnormal) Collected:  12/12/18 0628    Specimen:  Blood Updated:  12/12/18 0723     Glucose 253 mg/dL      BUN 14 mg/dL      Creatinine 0.87 mg/dL      Sodium 141 mmol/L      Potassium 3.5 mmol/L      Chloride 105 mmol/L      CO2 21.7 mmol/L      Calcium 8.3 mg/dL      eGFR  African Amer 74 mL/min/1.73      BUN/Creatinine Ratio 16.1     Anion Gap 14.3 mmol/L     Narrative:       The MDRD GFR formula is only valid for adults with stable renal function between ages 18 and 70.    POC Glucose Once [[id number]]  (Abnormal) Collected:  12/12/18 0609    Specimen:  Blood Updated:  12/12/18 0610     Glucose 245 mg/dL     POC Glucose Once [[id number]]  (Abnormal) Collected:  12/11/18 2022    Specimen:  Blood Updated:  12/11/18 2023     Glucose 198 mg/dL     POC Glucose Once [[id number]]  (Abnormal) Collected:  12/11/18 1713    Specimen:  Blood Updated:  12/11/18 1715     Glucose 172 mg/dL     POC Glucose Once [[id number]]  (Normal) Collected:  12/11/18 1417    Specimen:  Blood Updated:  12/11/18 1418     Glucose 129 mg/dL           PHYSICAL EXAM:     ASSESSMENT & PLAN:          Date: 12/12/2018    Arlyn Graham RN              Orthopedic Progress Note      Patient: Sienna Ortiz    YOB: 1929    Medical Record Number: [id number]    Attending Physician: Camron Mcmahon MD    Date of Admission: 12/9/2018  4:33 PM    Admitting Dx:  Closed fracture of left hip, initial encounter (CMS/[facility]) [S72.002A]  Fall, initial encounter [W19.XXXA]    Status Post: No admission procedures for hospital  encounter.    Post Operative Day Number: 1    Current Problem List:   Patient Active Problem List   Diagnosis   • Anemia, pernicious   • Cerebellar ataxia (CMS/HCC)   • Dementia   • Type II diabetes mellitus (CMS/HCC)   • Hyperlipidemia   • Benign essential hypertension   • Hypothyroidism   • Vitamin D deficiency   • Closed fracture of right hip with routine healing   • Slow transit constipation   • Fall   • Closed 2-part intertrochanteric fracture of left femur (CMS/HCC)         Past Medical History:   Diagnosis Date   • Anemia, pernicious 10/12/2014   • Benign essential hypertension 10/12/2014   • 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   • Dementia 02/19/2014   • Hyperlipidemia    • Hypothyroidism    • Type II diabetes mellitus (CMS/HCC)    • Vitamin D deficiency 07/26/2011    unspecified       SUBJECTIVE: 89 y.o.  female.  Patient is sitting up in a chair she is awake and alert and answers questions appropriately.      OBJECTIVE:   Vitals:    12/11/18 2207 12/11/18 2308 12/12/18 0321 12/12/18 0724   BP:  130/70 92/70 118/73   BP Location:  Left arm Right arm Right arm   Patient Position:  Lying Lying Lying   Pulse: 119 118 107 92   Resp:  16 16 18   Temp:  98.7 °F (37.1 °C) 98.5 °F (36.9 °C) 97.5 °F (36.4 °C)   TempSrc:  Oral Oral Oral   SpO2: 92% 92% 95% 94%   Weight:       Height:         I/O last 3 completed shifts:  In: 1160 [P.O.:360; I.V.:800]  Out: 2700 [Urine:2500; Blood:200]    Current Medications:  Scheduled Meds:  aspirin 325 mg Oral Daily   atorvastatin 10 mg Oral Daily   docusate sodium 100 mg Oral BID   donepezil 10 mg Oral Nightly   insulin lispro 0-9 Units Subcutaneous 4x Daily With Meals & Nightly   levothyroxine 75 mcg Oral Daily   metFORMIN 500 mg Oral TID   metoprolol tartrate 25 mg Oral Q12H   pantoprazole 40 mg Oral QAM   sodium chloride 3 mL Intravenous Q12H   sodium chloride 3 mL Intravenous Q12H     PRN Meds:.•  acetaminophen  •  acetaminophen  •  bisacodyl  •  bisacodyl  •  dextrose  •   dextrose  •  glucagon (human recombinant)  •  HYDROcodone-acetaminophen  •  HYDROcodone-acetaminophen  •  magnesium hydroxide  •  melatonin  •  Morphine  •  Morphine **AND** naloxone  •  [COMPLETED] Insert peripheral IV **AND** sodium chloride  •  sodium chloride  •  sodium chloride    Diagnostic Tests:   Lab Results (last 24 hours)     Procedure Component Value Units Date/Time    POC Glucose Once [[id number]]  (Abnormal) Collected:  12/12/18 1101    Specimen:  Blood Updated:  12/12/18 1103     Glucose 212 mg/dL     Magnesium [[id number]]  (Normal) Collected:  12/12/18 0628    Specimen:  Blood Updated:  12/12/18 0942     Magnesium 2.0 mg/dL     CBC & Differential [[id number]] Collected:  12/12/18 0628    Specimen:  Blood Updated:  12/12/18 0828    Narrative:       The following orders were created for panel order CBC & Differential.  Procedure                               Abnormality         Status                     ---------                               -----------         ------                     CBC Auto Differential[[id number]]        Abnormal            Final result                 Please view results for these tests on the individual orders.    CBC Auto Differential [[id number]]  (Abnormal) Collected:  12/12/18 0628    Specimen:  Blood Updated:  12/12/18 0828     WBC 11.97 10*3/mm3      RBC 3.38 10*6/mm3      Hemoglobin 10.0 g/dL      Hematocrit 30.9 %      MCV 91.4 fL      MCH 29.6 pg      MCHC 32.4 g/dL      RDW 13.1 %      RDW-SD 43.8 fl      MPV 11.5 fL      Platelets 200 10*3/mm3      Neutrophil % 83.7 %      Lymphocyte % 10.4 %      Monocyte % 5.8 %      Eosinophil % 0.0 %      Basophil % 0.1 %      Immature Grans % 0.4 %      Neutrophils, Absolute 10.02 10*3/mm3      Lymphocytes, Absolute 1.24 10*3/mm3      Monocytes, Absolute 0.70 10*3/mm3      Eosinophils, Absolute 0.00 10*3/mm3      Basophils, Absolute 0.01 10*3/mm3      Immature Grans, Absolute 0.05 10*3/mm3     TSH [[id number]]  (Normal) Collected:   12/12/18 0628    Specimen:  Blood Updated:  12/12/18 0726     TSH 2.220 mIU/mL     Basic Metabolic Panel [[id number]]  (Abnormal) Collected:  12/12/18 0628    Specimen:  Blood Updated:  12/12/18 0723     Glucose 253 mg/dL      BUN 14 mg/dL      Creatinine 0.87 mg/dL      Sodium 141 mmol/L      Potassium 3.5 mmol/L      Chloride 105 mmol/L      CO2 21.7 mmol/L      Calcium 8.3 mg/dL      eGFR  African Amer 74 mL/min/1.73      BUN/Creatinine Ratio 16.1     Anion Gap 14.3 mmol/L     Narrative:       The MDRD GFR formula is only valid for adults with stable renal function between ages 18 and 70.    POC Glucose Once [[id number]]  (Abnormal) Collected:  12/12/18 0609    Specimen:  Blood Updated:  12/12/18 0610     Glucose 245 mg/dL     POC Glucose Once [[id number]]  (Abnormal) Collected:  12/11/18 2022    Specimen:  Blood Updated:  12/11/18 2023     Glucose 198 mg/dL     POC Glucose Once [[id number]]  (Abnormal) Collected:  12/11/18 1713    Specimen:  Blood Updated:  12/11/18 1715     Glucose 172 mg/dL     POC Glucose Once [[id number]]  (Normal) Collected:  12/11/18 1417    Specimen:  Blood Updated:  12/11/18 1418     Glucose 129 mg/dL           PHYSICAL EXAM:   Left hip and thigh dressings dry and intact.  Left thigh and calf Calf soft and nontender.  Good motion and sensation to left foot and ankle, she was able to dorsiflex and plantarflex the ankle well.  Pain well controlled     ASSESSMENT & PLAN:  Continue to mobilize patient as she can tolerate.  Will continue with therapy she may weight-bear as tolerated on the left hip with walker and assistance.  She will need rehabilitation placement    Jorge Capone MD        Date: 12/12/2018    Arlyn Graham RN

## 2018-12-13 NOTE — PLAN OF CARE
Problem: Patient Care Overview  Goal: Plan of Care Review  Outcome: Ongoing (interventions implemented as appropriate)   12/13/18 8872   Coping/Psychosocial   Plan of Care Reviewed With patient   OTHER   Outcome Summary pt denies pain, disoriented x3, dsg cdi, sat up in chair couple hours, turned q 2 hours, vss, will ctm.     Goal: Individualization and Mutuality  Outcome: Ongoing (interventions implemented as appropriate)    Goal: Discharge Needs Assessment  Outcome: Ongoing (interventions implemented as appropriate)    Goal: Interprofessional Rounds/Family Conf  Outcome: Ongoing (interventions implemented as appropriate)      Problem: Fall Risk (Adult)  Goal: Absence of Fall  Outcome: Ongoing (interventions implemented as appropriate)      Problem: Skin Injury Risk (Adult)  Goal: Skin Health and Integrity  Outcome: Ongoing (interventions implemented as appropriate)      Problem: Fracture Orthopaedic (Adult)  Goal: Signs and Symptoms of Listed Potential Problems Will be Absent, Minimized or Managed (Fracture Orthopaedic)  Outcome: Ongoing (interventions implemented as appropriate)

## 2018-12-13 NOTE — PROGRESS NOTES
Continued Stay Note  [facility]     Patient Name: Sienna Ortiz  MRN: [id number]  Today's Date: 12/13/2018    Admit Date: 12/9/2018    Discharge Plan     Row Name 12/13/18 1124       Plan    Plan  Plan [facility] for skilled care - requested pre cert be initiated.   LILIAN Mott    Patient/Family in Agreement with Plan  yes    Plan Comments  Spoke with pt's son  (Abiel Ortiz [phone])  and he now states [facility] is his first choice.   Lorie  [phone]) called regarding bed availability and to start pre cert. Florina Varghese notified.  Plan [facility] pending pre cert and bed availability.  LILIAN Mott        Discharge Codes    No documentation.             Eileen Crenshaw, RN

## 2018-12-13 NOTE — PLAN OF CARE
Problem: Patient Care Overview  Goal: Plan of Care Review  Outcome: Ongoing (interventions implemented as appropriate)   12/13/18 3559   OTHER   Outcome Summary patient up in chair today, very confused, difficult to complete exercises even with verbal and tactile cueing. patient resistive to mobility, pushing posteriorly, lack of initiation with sit to stand transfer. required maxA x2 to get back to bed. anticipate slow progress with mobility

## 2018-12-13 NOTE — PLAN OF CARE
Problem: Patient Care Overview  Goal: Plan of Care Review  Outcome: Ongoing (interventions implemented as appropriate)   12/13/18 0300   Coping/Psychosocial   Plan of Care Reviewed With patient   Plan of Care Review   Progress no change   OTHER   Outcome Summary No c/o pain. Rested well. A&O to self only. Medications and IVFs administered per orders. Left hip dressing C/D/I. VSS. No s/s of distress at this time. Will continue to monitor.      Goal: Individualization and Mutuality  Outcome: Ongoing (interventions implemented as appropriate)    Goal: Discharge Needs Assessment  Outcome: Ongoing (interventions implemented as appropriate)    Goal: Interprofessional Rounds/Family Conf  Outcome: Ongoing (interventions implemented as appropriate)      Problem: Fall Risk (Adult)  Goal: Absence of Fall  Outcome: Ongoing (interventions implemented as appropriate)      Problem: Skin Injury Risk (Adult)  Goal: Skin Health and Integrity  Outcome: Ongoing (interventions implemented as appropriate)      Problem: Fracture Orthopaedic (Adult)  Goal: Signs and Symptoms of Listed Potential Problems Will be Absent, Minimized or Managed (Fracture Orthopaedic)  Outcome: Ongoing (interventions implemented as appropriate)

## 2018-12-13 NOTE — PROGRESS NOTES
Name: Sienna Ortiz ADMIT: 2018   : 3/4/1929  PCP: Sean Mayer MD    MRN: [id number] LOS: 4 days   AGE/SEX: 89 y.o. female  ROOM: [facility]   Subjective   No CP SOA NVD reported. Peripheral pain controlled.    Objective   Vital Signs  Temp:  [97.6 °F (36.4 °C)-98.5 °F (36.9 °C)] 98.1 °F (36.7 °C)  Heart Rate:  [] 115  Resp:  [16-18] 18  BP: (106-126)/(54-78) 126/63  SpO2:  [91 %-95 %] 91 %  on   ;   Device (Oxygen Therapy): room air  Body mass index is 26.57 kg/m².    Physical Exam   Constitutional: She appears well-developed. No distress.   frail   HENT:   Head: Atraumatic.   Nose: Nose normal.   Eyes: Conjunctivae and EOM are normal.   Neck: Normal range of motion. Neck supple.   Cardiovascular: Normal rate, regular rhythm and intact distal pulses.   Pulmonary/Chest: Effort normal. She has decreased breath sounds. She has no wheezes.   Abdominal: Soft. She exhibits no distension. There is no tenderness.   Musculoskeletal: She exhibits tenderness. She exhibits no edema.   Neurological: She is alert. No cranial nerve deficit.   Skin: Skin is warm and dry. She is not diaphoretic.   Psychiatric: She has a normal mood and affect. Her behavior is normal. Cognition and memory are impaired.   Nursing note and vitals reviewed.      Results Review:       I reviewed the patient's new clinical results.     I reviewed telemetry/EKG results, sinus.    Results from last 7 days   Lab Units  12/13/18   0628  12/12/18   0628  12/11/18   0630  12/10/18   0632   WBC 10*3/mm3  10.16  11.97*  9.56  8.46   HEMOGLOBIN g/dL  8.9*  10.0*  13.4  12.2   PLATELETS 10*3/mm3  185  200  217  257     Results from last 7 days   Lab Units  1828  1830  12/10/18   0632   SODIUM mmol/L  141  141  142  141   POTASSIUM mmol/L  3.6  3.5  3.1*  3.5   CHLORIDE mmol/L  104  105  105  106   CO2 mmol/L  26.4  21.7*  22.0  21.8*   BUN mg/dL  23  14  6*  9   CREATININE mg/dL  0.67  0.87  0.59  0.71    GLUCOSE mg/dL  166*  253*  187*  182*   Estimated Creatinine Clearance: 44.1 mL/min (by C-G formula based on SCr of 0.67 mg/dL).  Results from last 7 days   Lab Units  12/13/18   0628  12/12/18   0628  12/11/18   0630  12/10/18   0632  12/09/18   1931   CALCIUM mg/dL  8.6  8.3*  8.9  9.0  9.5   ALBUMIN g/dL   --    --    --   3.40*  4.00   MAGNESIUM mg/dL   --   2.0   --    --    --          aspirin 325 mg Oral Daily   atorvastatin 10 mg Oral Daily   docusate sodium 100 mg Oral BID   donepezil 10 mg Oral Nightly   insulin lispro 0-9 Units Subcutaneous 4x Daily With Meals & Nightly   levothyroxine 75 mcg Oral Daily   metFORMIN 500 mg Oral TID   metoprolol tartrate 25 mg Oral Q12H   pantoprazole 40 mg Oral QAM   sodium chloride 3 mL Intravenous Q12H       lactated ringers 9 mL/hr Last Rate: 9 mL/hr (12/11/18 1434)   lactated ringers 75 mL/hr Last Rate: 75 mL/hr (12/12/18 1548)   Diet Regular; Consistent Carbohydrate, Renal, Cardiac      Assessment/Plan      Active Hospital Problems    Diagnosis Date Noted   • **Closed 2-part intertrochanteric fracture of left femur (CMS/HCC) [S72.142A] 12/09/2018   • Fall [W19.XXXA] 12/09/2018   • Slow transit constipation [K59.01] 11/04/2015   • Type II diabetes mellitus (CMS/HCC) [E11.9]    • Hypothyroidism [E03.9]    • Benign essential hypertension [I10] 10/12/2014   • Dementia [F03.90] 02/19/2014      Resolved Hospital Problems   No resolved problems to display.     L Femur Fx: sp orif 12/11/18. Continue pain control. PT. ASA ppx per ortho. Orthopedic surgery following.  - Fall  - Constipation: Continue bowel regimen  - DM2: A1c 6.9. SSI.  - HTN: Stable  - Tachycardia: Improved rate with increased BBlocker.  - Dementia  - Dispostion: SNF/possibly tomorrow    Camron Mcmahon MD  [facility]  12/13/18  10:38 AM

## 2018-12-13 NOTE — PROGRESS NOTES
Orthopedic Progress Note        Patient: Sienna Ortiz    Date of Admission: 12/9/2018  4:33 PM    YOB: 1929    Medical Record Number: [id number]    Attending Physician: Camron Mcmahon MD    POD : 2    Systemic or Specific Complaints: Reports pain adequately controlled.  Tolerating po well.  No complaints or problems.  She is sitting up in bed       No Known Allergies    Current Medications:  Scheduled Meds:  aspirin 325 mg Oral Daily   atorvastatin 10 mg Oral Daily   docusate sodium 100 mg Oral BID   donepezil 10 mg Oral Nightly   insulin lispro 0-9 Units Subcutaneous 4x Daily With Meals & Nightly   levothyroxine 75 mcg Oral Daily   metFORMIN 500 mg Oral TID   metoprolol tartrate 25 mg Oral Q12H   pantoprazole 40 mg Oral QAM   sodium chloride 3 mL Intravenous Q12H     Continuous Infusions:   PRN Meds:.•  acetaminophen  •  acetaminophen  •  bisacodyl  •  bisacodyl  •  dextrose  •  dextrose  •  glucagon (human recombinant)  •  HYDROcodone-acetaminophen  •  HYDROcodone-acetaminophen  •  magnesium hydroxide  •  melatonin  •  Morphine  •  Morphine **AND** naloxone  •  [COMPLETED] Insert peripheral IV **AND** sodium chloride  •  sodium chloride  •  sodium chloride      Physical Exam: 89 y.o. female    General Appearance:   Awake and alert.  NAD.  Patient sitting up in bed answers questions appropriately   Vitals:    12/12/18 2312 12/12/18 2323 12/13/18 0819 12/13/18 1422   BP: 109/78  126/63 123/78   BP Location: Right arm  Right arm Right arm   Patient Position: Lying  Lying Sitting   Pulse: 68  115    Resp: 18 18 18   Temp: 97.6 °F (36.4 °C)  98.1 °F (36.7 °C) 97.9 °F (36.6 °C)   TempSrc: Oral  Oral Oral   SpO2: 91% 93% 91%    Weight:       Height:              Extremities:  Left thigh and leg dressings are intact and dry.  There is swelling to the left thigh but it is soft.  Left calf is nontender.  She was able to dorsiflex and plantarflex the ankle well              Diagnostic Tests:   Lab  Results (last 24 hours)     Procedure Component Value Units Date/Time    POC Glucose Once [[id number]]  (Normal) Collected:  12/13/18 1149    Specimen:  Blood Updated:  12/13/18 1155     Glucose 122 mg/dL     Basic Metabolic Panel [[id number]]  (Abnormal) Collected:  12/13/18 0628    Specimen:  Blood Updated:  12/13/18 0721     Glucose 166 mg/dL      BUN 23 mg/dL      Creatinine 0.67 mg/dL      Sodium 141 mmol/L      Potassium 3.6 mmol/L      Chloride 104 mmol/L      CO2 26.4 mmol/L      Calcium 8.6 mg/dL      eGFR  African Amer 100 mL/min/1.73      BUN/Creatinine Ratio 34.3     Anion Gap 10.6 mmol/L     Narrative:       The MDRD GFR formula is only valid for adults with stable renal function between ages 18 and 70.    CBC & Differential [[id number]] Collected:  12/13/18 0628    Specimen:  Blood Updated:  12/13/18 0657    Narrative:       The following orders were created for panel order CBC & Differential.  Procedure                               Abnormality         Status                     ---------                               -----------         ------                     CBC Auto Differential[[id number]]        Abnormal            Final result                 Please view results for these tests on the individual orders.    CBC Auto Differential [[id number]]  (Abnormal) Collected:  12/13/18 0628    Specimen:  Blood Updated:  12/13/18 0657     WBC 10.16 10*3/mm3      RBC 3.04 10*6/mm3      Hemoglobin 8.9 g/dL      Hematocrit 28.9 %      MCV 95.1 fL      MCH 29.3 pg      MCHC 30.8 g/dL      RDW 13.0 %      RDW-SD 44.8 fl      MPV 10.6 fL      Platelets 185 10*3/mm3      Neutrophil % 67.8 %      Lymphocyte % 21.2 %      Monocyte % 8.6 %      Eosinophil % 1.8 %      Basophil % 0.1 %      Immature Grans % 0.5 %      Neutrophils, Absolute 6.90 10*3/mm3      Lymphocytes, Absolute 2.15 10*3/mm3      Monocytes, Absolute 0.87 10*3/mm3      Eosinophils, Absolute 0.18 10*3/mm3      Basophils, Absolute 0.01 10*3/mm3       Immature Grans, Absolute 0.05 10*3/mm3     POC Glucose Once [[id number]]  (Abnormal) Collected:  12/13/18 0558    Specimen:  Blood Updated:  12/13/18 0559     Glucose 151 mg/dL     POC Glucose Once [[id number]]  (Abnormal) Collected:  12/12/18 1959    Specimen:  Blood Updated:  12/12/18 2001     Glucose 177 mg/dL                   Assessment: Status post left hip nailing for intertrochanteric fracture  Plan:    Continue with medical management.  She may weight-bear as tolerated with walker and assistance and will continue with physical therapy.  She will need rehabilitation placement and is stable for discharge from an orthopedic standpoint when he went okay for medical standpoint and rehabilitation admission established.    Date: 12/13/2018  Time: 4:44 PM    Jorge Capone MD

## 2018-12-13 NOTE — THERAPY TREATMENT NOTE
Acute Care - Physical Therapy Treatment Note  [facility]     Patient Name: Sienna Ortiz  : 3/4/1929  MRN: [id number]  Today's Date: 2018  Onset of Illness/Injury or Date of Surgery: 18          Admit Date: 2018    Visit Dx:    ICD-10-CM ICD-9-CM   1. Fall, initial encounter W19.XXXA E888.9   2. Closed fracture of left hip, initial encounter (CMS/[facility]) S72.002A 820.8   3. Essential hypertension I10 401.9   4. 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[facility])   • Dementia   • Type II diabetes mellitus (CMS/[facility])   • Hyperlipidemia   • Benign essential hypertension   • Hypothyroidism   • Vitamin D deficiency   • Closed fracture of right hip with routine healing   • Slow transit constipation   • Fall   • Closed 2-part intertrochanteric fracture of left femur (CMS/[facility])       Therapy Treatment    Rehabilitation Treatment Summary     Row Name 18 1533             Treatment Time/Intention    Discipline  physical therapist  -EM      Document Type  therapy note (daily note)  -EM      Subjective Information  complains of;pain very confused today   -EM      Mode of Treatment  physical therapy  -EM      Patient/Family Observations  patient sitting up in chair, awake and alert, confused to place and situation  -EM      Patient Effort  fair  -EM      Existing Precautions/Restrictions  fall  -EM      Recorded by [EM] Danya Monae, PT 18 1536      Row Name 18 1533             Bed Mobility Assessment/Treatment    Bed Mobility Assessment/Treatment  sit-supine  -EM      Supine-Sit [place] (Bed Mobility)  not tested  -EM      Sit-Supine [place] (Bed Mobility)  maximum assist (25% patient effort);2 person assist  -EM      Assistive Device (Bed Mobility)  draw sheet  -EM      Recorded by [EM] Danya Monae, PT 18 3204      Row Name 18 1532             Transfer Assessment/Treatment     Transfer Assessment/Treatment  chair-bed transfer  -EM      Recorded by [EM] Danya Monae, PT 12/13/18 1536      Row Name 12/13/18 1533             Chair-Bed Transfer    Chair-Bed [place] (Transfers)  maximum assist (25% patient effort);2 person assist  -EM      Assistive Device (Chair-Bed Transfers)  walker, front-wheeled  -EM      Recorded by [EM] Danya Monae, PT 12/13/18 1536      Row Name 12/13/18 1533             Sit-Stand Transfer    Sit-Stand [place] (Transfers)  maximum assist (25% patient effort);2 person assist  -EM      Assistive Device (Sit-Stand Transfers)  walker, front-wheeled  -EM      Recorded by [EM] Danya Monae, PT 12/13/18 1536      Row Name 12/13/18 1533             Stand-Sit Transfer    Stand-Sit [place] (Transfers)  maximum assist (25% patient effort);2 person assist  -EM      Assistive Device (Stand-Sit Transfers)  walker, front-wheeled  -EM      Recorded by [EM] Danya Monae, PT 12/13/18 1536      Row Name 12/13/18 1533             Therapeutic Exercise    Comment (Therapeutic Exercise)  15 reps hip ex, assist with all, pt very guarded and confused today   -EM      Recorded by [EM] Danya Monae, PT 12/13/18 1536      Row Name 12/13/18 1533             Positioning and Restraints    Pre-Treatment Position  sitting in chair/recliner  -EM      Post Treatment Position  bed  -EM      In Bed  supine;call light within reach;exit alarm on;notified nsg  -EM      Recorded by [EM] Danya Monae, PT 12/13/18 1536      Row Name 12/13/18 1533             Pain Scale: Numbers Pre/Post-Treatment    Pain Location - Side  Left  -EM      Pain Location  hip  -EM      Pain Intervention(s)  Medication (See MAR);Repositioned  -EM      Recorded by [EM] Danya Monae, PT 12/13/18 1536      Row Name 12/13/18 1533             Pain Scale: Word Pre/Post-Treatment    Pain: Word Scale, Pretreatment  4 - moderate pain  -EM      Recorded by [EM] Letha  Danya BASURTO, PT 12/13/18 1536      Row Name                Wound 12/11/18 1600 Left hip incision    Wound - Properties Group Date first assessed: 12/11/18 [MB] Time first assessed: 1600 [MB] Side: Left [MB] Location: hip [MB] Type: incision [MB] Recorded by:  [MB] Mariia Diane RN 12/11/18 1600    Row Name 12/13/18 1533             Outcome Summary/Treatment Plan (PT)    Anticipated Discharge Disposition (PT)  skilled nursing facility  -EM      Recorded by [EM] Danya Monae, PT 12/13/18 1536        User Key  (r) = Recorded By, (t) = Taken By, (c) = Cosigned By    Initials Name Effective Dates Discipline    Mariia Camilo RN 06/16/16 -  Nurse    EM Danya Monae, PT 04/03/18 -  PT          Wound 12/11/18 1600 Left hip incision (Active)   Dressing Appearance dry;intact;no drainage 12/13/2018  2:35 PM   Closure SHANTANU 12/13/2018  2:35 PM   Base dressing in place, unable to visualize 12/13/2018  2:35 PM   Drainage Amount none 12/13/2018  2:35 PM   Dressing Care, Wound gauze;transparent film 12/13/2018  2:35 PM           Physical Therapy Education     Title: PT OT SLP Therapies (In Progress)     Topic: Physical Therapy (In Progress)     Point: Mobility training (In Progress)     Learning Progress Summary           Patient Nonacceptance, E, NR by EM at 12/13/2018  3:36 PM    Acceptance, E, NR by EM at 12/12/2018 11:42 AM                   Point: Home exercise program (In Progress)     Learning Progress Summary           Patient Acceptance, E, NR by EM at 12/12/2018 11:42 AM                               User Key     Initials Effective Dates Name Provider Type Discipline    EM 04/03/18 -  Danya Monae, PT Physical Therapist PT                PT Recommendation and Plan  Anticipated Discharge Disposition (PT): skilled nursing facility  Planned Therapy Interventions (PT Eval): bed mobility training, gait training, home exercise program, transfer training  Therapy Frequency (PT Clinical Impression):  daily  Outcome Summary/Treatment Plan (PT)  Anticipated Discharge Disposition (PT): skilled nursing facility  Plan of Care Reviewed With: patient  Outcome Summary: patient up in chair today, very confused, difficult to complete exercises even with verbal and tactile cueing. patient resistive to mobility, pushing posteriorly, lack of initiation with sit to stand transfer. required maxA x2 to get back to bed. anticipate slow progress with mobility   Outcome Measures     Row Name 12/13/18 1500 12/12/18 1100          How much help from another person do you currently need...    Turning from your back to your side while in flat bed without using bedrails?  2  -EM  2  -EM     Moving from lying on back to sitting on the side of a flat bed without bedrails?  2  -EM  2  -EM     Moving to and from a bed to a chair (including a wheelchair)?  2  -EM  2  -EM     Standing up from a chair using your arms (e.g., wheelchair, bedside chair)?  2  -EM  2  -EM     Climbing 3-5 steps with a railing?  1  -EM  1  -EM     To walk in hospital room?  1  -EM  1  -EM     AM-PAC 6 Clicks Score  10  -EM  10  -EM       User Key  (r) = Recorded By, (t) = Taken By, (c) = Cosigned By    Initials Name Provider Type    Danya Membreno, PT Physical Therapist         Time Calculation:   PT Charges     Row Name 12/13/18 1538             Time Calculation    Start Time  1500  -EM      Stop Time  1526  -EM      Time Calculation (min)  26 min  -EM      PT Received On  12/13/18  -EM      PT - Next Appointment  12/14/18  -EM         Time Calculation- PT    Total Timed Code Minutes- PT  26 minute(s)  -EM        User Key  (r) = Recorded By, (t) = Taken By, (c) = Cosigned By    Initials Name Provider Type    Danya Membreno PT Physical Therapist        Therapy Suggested Charges     Code   Minutes Charges    None           Therapy Charges for Today     Code Description Service Date Service Provider Modifiers Qty    74393276780  PT EVAL MOD  COMPLEXITY 2 12/12/2018 Danya Monae, PT GP 1    76128327917 HC PT THER PROC EA 15 MIN 12/12/2018 Danya Monae, PT GP 1    39176891308 HC PT THER SUPP EA 15 MIN 12/12/2018 Danya Monae, PT GP 1    01562859176 HC PT THER PROC EA 15 MIN 12/13/2018 Danya Monae, PT GP 2    51563625954 HC PT THER SUPP EA 15 MIN 12/13/2018 Danya Monae, PT GP 1          PT G-Codes  AM-PAC 6 Clicks Score: 10    Danya Monae, PT  12/13/2018

## 2018-12-14 NOTE — PROGRESS NOTES
Continued Stay Note  [facility]     Patient Name: Sienna Ortiz  MRN: [id number]  Today's Date: 12/14/2018    Admit Date: 12/9/2018    Discharge Plan     Row Name 12/14/18 1227       Plan    Plan  Plan [facility] for skilled care- awaiting pre cert.  LILIAN Mott    Patient/Family in Agreement with Plan  yes    Plan Comments  Called Lorie  ( [phone]) with [facility] regarding pre cert. Lorie states she had to fax updated clinicals.  Plan [facility] - awaiting pre cert.  Tiera RN        Discharge Codes    No documentation.             Eileen Crenshaw, RN

## 2018-12-14 NOTE — DISCHARGE SUMMARY
Date of Admission: 12/9/2018  Date of Discharge:  12/18/2018  Primary Care Physician: Sean Mayer MD     Discharge Diagnosis:  Active Hospital Problems    Diagnosis Date Noted   • **Closed 2-part intertrochanteric fracture of left femur (CMS/[facility]) [S72.142A] 12/09/2018   • DUB (dysfunctional uterine bleeding) [N93.8] 12/15/2018   • Fall [W19.XXXA] 12/09/2018   • Slow transit constipation [K59.01] 11/04/2015   • Type II diabetes mellitus (CMS/[facility]) [E11.9]    • Hypothyroidism [E03.9]    • Benign essential hypertension [I10] 10/12/2014   • Dementia [F03.90] 02/19/2014      Resolved Hospital Problems   No resolved problems to display.       Presenting Problem/History of Present Illness:  Closed fracture of left hip, initial encounter (CMS/[facility]) [S72.002A]  Fall, initial encounter [W19.XXXA]   Patient is a 89-year-old female with significant dementia and unable to give an account of her symptoms was evidently found to be doing okay at around 5 this morning.  Patient usually ambulates with the assistance of cane.  Patient was then found down in her room around 1 PM by family members resulting in EMS being called and brought to emergency room because she was unable to stand bear weight and was complaining of pain in her left leg.  Workup in the emergency room revealed left intertrochanteric fracture.    Hospital Course:  The patient is a 89 y.o. female who presented with dementia fall and left hip fracture. She was admitted pain was controlled and Dr. Capone with orthopedic surgery was consulted. She underwent ORIF on 12/11/18. She tolerated the procedure well and has had minimal pain afterwards. She has not needed any narcotics for the past several days. She has been given aspirin 325 mg as prophylaxis per orthopedic instructions and she will continue this for 2 weeks and then can resume her 81 mg dosing. She had some continued tachycardia on this admission. On telemetry there was concern for MAT however EKG showed  "sinus tachycardia and she did not have evidence of A. fib. Her metoprolol was increased and this has controlled her heart rate. She has dementia and came from skilled nursing facility. She worked with physical therapy and skilled nursing is recommended for her to continue therapy. She is stable to be discharged today and should follow-up with Dr. Capone and her primary care physician in clinic.  Addendum:  The initial discharge was delayed due to heavy vaginal bleeding.  GYN consultation was obtained.  Ultrasound of the pelvis revealed:  \"echogenic material distending the endometrial cavity that demonstrates evidence of blood flow on the Doppler images. This measures up to 2.6 cm in thickness. The findings are suspicious for an endometrial carcinoma.\"  After discussion with her son the decision was to start her on Megace to see if this would not stop the bleeding.  Her aspirin was also put on hold.  She is a very poor candidate for any form of aggressive intervention.  The gross bloody vaginal discharge has subsided with only minimal amount of discharge at this point.  She did require transfusion 1 unit packed RBCs.  Presently she is stable again and can be transferred to rehabilitation for the remainder of her treatment and follow-up.  With her advanced age and being very thin and frail I'm also recommending her metformin be discontinued.      Exam Today:  Constitutional: She appears well-developed. No distress.   frail   HENT:   Head: Atraumatic.   Nose: Nose normal.   Eyes: Conjunctivae and EOM are normal.   Neck: Normal range of motion. Neck supple.   Cardiovascular: Normal rate, regular rhythm and intact distal pulses.   Pulmonary/Chest: Effort normal. She has decreased breath sounds. She has no wheezes.   Abdominal: Soft. She exhibits no distension. There is no tenderness.   Musculoskeletal: She exhibits tenderness. She exhibits no edema.   Neurological: She is alert. No cranial nerve deficit.   Skin: Skin " is warm and dry. She is not diaphoretic.   Psychiatric: She has a normal mood and affect. Her behavior is normal. Cognition and memory are impaired.   12/18/2018:  Afebrile vital signs stable.  Well-developed thin female in no apparent distress.  Lungs clear to auscultation with good air movement.  Heart regular rate and rhythm.  Abdomen with normal bowel sounds.  No tenderness organomegaly guarding or masses.  Extremities with no clubbing cyanosis or edema.  Alert but oriented only to person.  Cooperative and pleasant.    Results:  CT Head  1. No acute intracranial abnormality.    Procedures Performed:  Procedure(s):  HIP TROCANTERIC NAILING LONG WITH INTRAMEDULLARY HIP SCREW       Consults:   Consults     Date and Time Order Name Status Description    12/15/2018 1141 Inpatient Gynecologic Oncology Consult      12/14/2018 1642 Inpatient Obstetrics / Gynecology Consult Completed     12/9/2018 1904 [facility] (on-call MD unless specified) Completed            Discharge Disposition:  Rehab Facility or Unit (DC - External)    Discharge Medications:     Discharge Medications      New Medications      Instructions Start Date   acetaminophen 325 MG tablet  Commonly known as:  TYLENOL   650 mg, Oral, Every 4 Hours PRN      bisacodyl 5 MG EC tablet  Commonly known as:  DULCOLAX   5 mg, Oral, Daily PRN      megestrol 40 MG tablet  Commonly known as:  MEGACE   40 mg, Oral, 2 Times Daily         Continue These Medications      Instructions Start Date   docusate sodium 100 MG capsule  Commonly known as:  COLACE   100 mg, Oral, 2 Times Daily      donepezil 10 MG tablet  Commonly known as:  ARICEPT   10 mg, Oral, Nightly      levothyroxine 75 MCG tablet  Commonly known as:  SYNTHROID, LEVOTHROID   75 mcg, Oral, Daily      metoprolol tartrate 25 MG tablet  Commonly known as:  LOPRESSOR   25 mg, Oral, 2 Times Daily      omeprazole OTC 20 MG EC tablet  Commonly known as:  PriLOSEC OTC   20 mg, Oral, Daily, Take 1 tablet by oral route once  "daily for 30 days       pravastatin 40 MG tablet  Commonly known as:  PRAVACHOL   40 mg, Oral, Daily, Take 1 tablet by oral route once daily for 30 days       Unable to find   Novofine 32 miscellaneous needle 32 X 1/4\"; use as directed with pen       Unable to find   Vitamin B-12 injection solution 1,000 mcg/mL; inject 1 milliliter by intramuscular route once a month for 90 days          Stop These Medications    aspirin 81 MG EC tablet     metFORMIN 500 MG tablet  Commonly known as:  GLUCOPHAGE            Discharge Diet:   Diet Instructions     Diet: Consistent Carbohydrate      Discharge Diet:  Consistent Carbohydrate    Diet: Consistent Carbohydrate, Cardiac      Discharge Diet:   Consistent Carbohydrate  Cardiac             Activity at Discharge:   Activity Instructions     Discharge Activity      PT - Ambulation, gait training, transfers WBAT with walker.   Muscle strengthening exercises to BUE and BLE.    Discharge Activity Restrictions      Per orthopedic instructions    Discharge Activity Restrictions      As per orthopedic surgery          Follow-up Appointments:  Additional Instructions for the Follow-ups that You Need to Schedule     Discharge Follow-up with PCP   As directed       Currently Documented PCP:    Sean Mayer MD    PCP Phone Number:    [phone]     Follow Up Details:  1 week         Discharge Follow-up with Specialty: Orthopedic Surgery,  Gyn   As directed      Specialty:  Orthopedic Surgery,  Gyn    Follow Up Details:  As directed.         Discharge Follow-up with Specialty: Orthopedics; 3 Weeks   As directed      Specialty:  Orthopedics    Follow Up:  3 Weeks    Follow Up Details:  Return to the office to see Dr. Jorge Capone         Dressing Change Instructions   As directed      Dressing change: Dry dressing changes daily to begin 12/20/18    Order Comments:  Dressing change: Dry dressing changes daily to begin 12/20/18          CBC (No Diff)    Dec 24, 2018 (Approximate)   "      Follow-up Information     Sean Mayer MD Follow up.    Specialty:  Family Medicine  Contact information:  [address]  [place] [id number]  [phone]             Jorge Capone MD Follow up in 3 week(s).    Specialty:  Orthopedic Surgery  Contact information:  [address]  [place] [id number]  [phone]             Sean Mayer MD .    Specialty:  Family Medicine  Why:  1 week  Contact information:  [address] [place] [id number]  [phone]                   Test Results Pending at Discharge:       Cresencio Qureshi MD  12/18/18  12:15 PM    Time Spent on Discharge Activities: >30 minutes

## 2018-12-14 NOTE — THERAPY TREATMENT NOTE
Acute Care - Physical Therapy Treatment Note  [facility]     Patient Name: Sienna Ortiz  : 3/4/1929  MRN: [id number]  Today's Date: 2018  Onset of Illness/Injury or Date of Surgery: 18          Admit Date: 2018    Visit Dx:    ICD-10-CM ICD-9-CM   1. Fall, initial encounter W19.XXXA E888.9   2. Closed fracture of left hip, initial encounter (CMS/[facility]) S72.002A 820.8   3. Essential hypertension I10 401.9   4. 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[facility])   • Dementia   • Type II diabetes mellitus (CMS/[facility])   • Hyperlipidemia   • Benign essential hypertension   • Hypothyroidism   • Vitamin D deficiency   • Closed fracture of right hip with routine healing   • Slow transit constipation   • Fall   • Closed 2-part intertrochanteric fracture of left femur (CMS/[facility])       Therapy Treatment    Rehabilitation Treatment Summary     Row Name 18 142             Treatment Time/Intention    Discipline  physical therapist  -EM      Document Type  therapy note (daily note)  -EM      Subjective Information  complains of;pain  -EM      Mode of Treatment  physical therapy  -EM      Patient/Family Observations  pt in supine, awake and alert, son at bedside   -EM      Patient Effort  adequate  -EM      Existing Precautions/Restrictions  fall  -EM      Recorded by [EM] Danya Monae, PT 18 1425      Row Name 18 142             Bed Mobility Assessment/Treatment    Supine-Sit [place] (Bed Mobility)  maximum assist (25% patient effort)  -EM      Recorded by [EM] Danya Monae, PT 18 1425      Row Name 18 1420             Sit-Stand Transfer    Sit-Stand [place] (Transfers)  maximum assist (25% patient effort);2 person assist  -EM      Assistive Device (Sit-Stand Transfers)  walker, front-wheeled  -EM      Recorded by [EM] Danya Monae, PT 18 1425      Row Name  12/14/18 1420             Stand-Sit Transfer    Stand-Sit [place] (Transfers)  maximum assist (25% patient effort);2 person assist  -EM      Assistive Device (Stand-Sit Transfers)  walker, front-wheeled  -EM      Recorded by [EM] Danya Monae, PT 12/14/18 1425      Row Name 12/14/18 1420             Gait/Stairs Assessment/Training    [place] Level (Gait)  maximum assist (25% patient effort);2 person assist  -EM      Assistive Device (Gait)  walker, front-wheeled  -EM      Distance in Feet (Gait)  3  -EM      Comment (Gait/Stairs)  strong posterior lean, shuffles feet to turn towards chair initially but then finished turn to chair with maxA stand pivot   -EM      Recorded by [EM] Danya Monae, PT 12/14/18 1425      Row Name 12/14/18 1420             Therapeutic Exercise    Comment (Therapeutic Exercise)  15 reps hip ex, extra time to initiate movement, was able to complete some QS  -EM      Recorded by [EM] Danya Monae, PT 12/14/18 1425      Row Name 12/14/18 1420             Positioning and Restraints    Pre-Treatment Position  in bed  -EM      Post Treatment Position  chair  -EM      In Chair  reclined;call light within reach;exit alarm on;with family/caregiver;notified nsg  -EM      Recorded by [EM] Danya Monae, PT 12/14/18 1425      Row Name 12/14/18 1420             Pain Scale: Numbers Pre/Post-Treatment    Pain Location - Side  Left  -EM      Pain Location  hip  -EM      Pain Intervention(s)  Medication (See MAR);Repositioned  -EM      Recorded by [EM] Danya Monae, PT 12/14/18 1425      Row Name 12/14/18 1420             Pain Scale: Word Pre/Post-Treatment    Pain: Word Scale, Pretreatment  4 - moderate pain  -EM      Recorded by [EM] Danya Monae, PT 12/14/18 1425      Row Name                Wound 12/11/18 1600 Left hip incision    Wound - Properties Group Date first assessed: 12/11/18 [MB] Time first assessed: 1600 [MB] Side: Left [MB] Location:  hip [MB] Type: incision [MB] Recorded by:  [MB] Mariia Diane RN 12/11/18 1600    Row Name 12/14/18 1420             Outcome Summary/Treatment Plan (PT)    Anticipated Discharge Disposition (PT)  skilled nursing facility  -      Recorded by [EM] Danya Monae, PT 12/14/18 1425        User Key  (r) = Recorded By, (t) = Taken By, (c) = Cosigned By    Initials Name Effective Dates Discipline    MB Mariia Diane RN 06/16/16 -  Nurse    EM Danya Monae, PT 04/03/18 -  PT          Wound 12/11/18 1600 Left hip incision (Active)   Dressing Appearance dried drainage;dry;intact 12/14/2018  9:15 AM   Closure SHANTANU 12/14/2018  9:15 AM   Base dressing in place, unable to visualize 12/14/2018  9:15 AM   Drainage Amount scant 12/14/2018  9:15 AM   Dressing Care, Wound gauze;transparent film 12/14/2018  9:15 AM           Physical Therapy Education     Title: PT OT SLP Therapies (In Progress)     Topic: Physical Therapy (In Progress)     Point: Mobility training (In Progress)     Learning Progress Summary           Patient Acceptance, E, NR by EM at 12/14/2018  2:25 PM    Nonacceptance, E, NR by EM at 12/13/2018  3:36 PM    Acceptance, E, NR by EM at 12/12/2018 11:42 AM                   Point: Home exercise program (In Progress)     Learning Progress Summary           Patient Acceptance, E, NR by EM at 12/14/2018  2:25 PM    Acceptance, E, NR by EM at 12/12/2018 11:42 AM                               User Key     Initials Effective Dates Name Provider Type Discipline    EM 04/03/18 -  Danya Monae, PT Physical Therapist PT                PT Recommendation and Plan  Anticipated Discharge Disposition (PT): skilled nursing facility  Planned Therapy Interventions (PT Eval): bed mobility training, gait training, home exercise program, transfer training  Therapy Frequency (PT Clinical Impression): daily  Outcome Summary/Treatment Plan (PT)  Anticipated Discharge Disposition (PT): skilled nursing  facility  Plan of Care Reviewed With: patient  Outcome Summary: patient completed hip exercises with tactile and verbal cueing, very rigid with bed mobility attempts, requiring maxA to get to edge of bed. Patient requires maximal assistance to stand and pivot to chair, difficulty with weight shifting and advancing feet to take any steps.   Outcome Measures     Row Name 12/14/18 1400 12/13/18 1500 12/12/18 1100       How much help from another person do you currently need...    Turning from your back to your side while in flat bed without using bedrails?  2  -EM  2  -EM  2  -EM    Moving from lying on back to sitting on the side of a flat bed without bedrails?  2  -EM  2  -EM  2  -EM    Moving to and from a bed to a chair (including a wheelchair)?  2  -EM  2  -EM  2  -EM    Standing up from a chair using your arms (e.g., wheelchair, bedside chair)?  2  -EM  2  -EM  2  -EM    Climbing 3-5 steps with a railing?  1  -EM  1  -EM  1  -EM    To walk in hospital room?  1  -EM  1  -EM  1  -EM    AM-PAC 6 Clicks Score  10  -EM  10  -EM  10  -EM      User Key  (r) = Recorded By, (t) = Taken By, (c) = Cosigned By    Initials Name Provider Type    EM Danya Monae PT Physical Therapist         Time Calculation:   PT Charges     Row Name 12/14/18 1426             Time Calculation    Start Time  1350  -EM      Stop Time  1414  -EM      Time Calculation (min)  24 min  -EM      PT Received On  12/14/18  -EM      PT - Next Appointment  12/15/18  -EM         Time Calculation- PT    Total Timed Code Minutes- PT  24 minute(s)  -EM        User Key  (r) = Recorded By, (t) = Taken By, (c) = Cosigned By    Initials Name Provider Type    Danya Membreno PT Physical Therapist        Therapy Suggested Charges     Code   Minutes Charges    None           Therapy Charges for Today     Code Description Service Date Service Provider Modifiers Qty    35901124698 HC PT THER PROC EA 15 MIN 12/13/2018 Danya Monae PT GP 2     25052074647 HC PT THER SUPP EA 15 MIN 12/13/2018 Danya Monae, PT GP 1    72473585880 HC PT THER PROC EA 15 MIN 12/14/2018 Danya Monae, PT GP 2    53100694963 HC PT THER SUPP EA 15 MIN 12/14/2018 Danya Monae, PT GP 1          PT G-Codes  AM-PAC 6 Clicks Score: 10    Danya Monae, PT  12/14/2018

## 2018-12-14 NOTE — PROGRESS NOTES
Continued Stay Note  [facility]     Patient Name: Sienna Ortiz  MRN: [id number]  Today's Date: 12/14/2018    Admit Date: 12/9/2018    Discharge Plan     Row Name 12/14/18 1405       Plan    Plan  Plan [facility] for skilled care- pre cert obtained.   LILIAN Mott    Patient/Family in Agreement with Plan  yes    Plan Comments  Beverly   ( [phone]) called and states [facility] has obtained pre cert.  Spoke with pt and pt's son  (Abiel Ortiz [phone]) at bedside.  Pt and son agreeable to [facility].  Pt's son states he prefers ambulance transport.  Pt and son aware payment for ambulance cannot be guaranteed.  [facility] called and  time set for 9:30.  Packet to RN.  Plan [facility] for skilled care.  LILIAN Mott    Row Name 12/14/18 2266       Plan    Plan  Plan [facility] for skilled care- awaiting pre cert.  LILIAN Mott    Patient/Family in Agreement with Plan  yes    Plan Comments  Called Lorie  ( [phone]) with [facility] regarding pre cert. Lorie states she had to fax updated clinicals.  Plan [facility] - awaiting pre cert.  LILIAN Mott        Discharge Codes    No documentation.       Expected Discharge Date and Time     Expected Discharge Date Expected Discharge Time    Dec 14, 2018             Eileen Crenshaw RN

## 2018-12-14 NOTE — PLAN OF CARE
Problem: Patient Care Overview  Goal: Plan of Care Review  Outcome: Ongoing (interventions implemented as appropriate)   12/14/18 0553   Coping/Psychosocial   Plan of Care Reviewed With patient   Plan of Care Review   Progress no change   OTHER   Outcome Summary PT SLEEPING QUIETLY, NO S/SX OF DISCOMFORT OR DISTRESS. PT TURNED Q2HRS, VSS. HEELS OFFLOAD USING PILLOW. DRESSING TO L LE DRY AND INTACT.      Goal: Individualization and Mutuality  Outcome: Ongoing (interventions implemented as appropriate)    Goal: Discharge Needs Assessment  Outcome: Ongoing (interventions implemented as appropriate)    Goal: Interprofessional Rounds/Family Conf  Outcome: Ongoing (interventions implemented as appropriate)      Problem: Fall Risk (Adult)  Goal: Absence of Fall  Outcome: Ongoing (interventions implemented as appropriate)      Problem: Skin Injury Risk (Adult)  Goal: Skin Health and Integrity  Outcome: Ongoing (interventions implemented as appropriate)      Problem: Fracture Orthopaedic (Adult)  Goal: Signs and Symptoms of Listed Potential Problems Will be Absent, Minimized or Managed (Fracture Orthopaedic)  Outcome: Ongoing (interventions implemented as appropriate)

## 2018-12-14 NOTE — PROGRESS NOTES
Name: Sienna Ortiz ADMIT: 2018   : 3/4/1929  PCP: Sean Mayer MD    MRN: [id number] LOS: 5 days   AGE/SEX: 89 y.o. female  ROOM: 60/   Subjective   Sleeping when I entered. Reported she didn't feel well. Says she has headache. Tension quality. Similar to previous headaches. No nausea, neck pain, or photophobia. No CP SOA NVD.    Objective   Vital Signs  Temp:  [97.9 °F (36.6 °C)-99.3 °F (37.4 °C)] 99.3 °F (37.4 °C)  Heart Rate:  [] 99  Resp:  [18] 18  BP: (123-137)/(74-87) 134/87  SpO2:  [95 %-96 %] 96 %  on   ;   Device (Oxygen Therapy): room air  Body mass index is 26.57 kg/m².    Physical Exam   Constitutional: She appears well-developed. No distress.   frail   HENT:   Head: Atraumatic.   Nose: Nose normal.   Eyes: Conjunctivae and EOM are normal.   Neck: Normal range of motion. Neck supple.   Cardiovascular: Normal rate, regular rhythm and intact distal pulses.   Pulmonary/Chest: Effort normal. She has decreased breath sounds. She has no wheezes.   Abdominal: Soft. She exhibits no distension. There is no tenderness.   Musculoskeletal: She exhibits tenderness. She exhibits no edema.   Neurological: She is alert. No cranial nerve deficit.   Skin: Skin is warm and dry. She is not diaphoretic.   Psychiatric: She has a normal mood and affect. Her behavior is normal. Cognition and memory are impaired.   Nursing note and vitals reviewed.      Results Review:       I reviewed the patient's new clinical results.     I reviewed telemetry/EKG results, sinus    Results from last 7 days   Lab Units  18   WBC 10*3/mm3  9.46  10.16  11.97*  9.56   HEMOGLOBIN g/dL  9.1*  8.9*  10.0*  13.4   PLATELETS 10*3/mm3  212  185  200  217     Results from last 7 days   Lab Units  18   SODIUM mmol/L  141  141  141  142   POTASSIUM mmol/L  3.5  3.6  3.5  3.1*   CHLORIDE mmol/L  104  104   105  105   CO2 mmol/L  25.8  26.4  21.7*  22.0   BUN mg/dL  14  23  14  6*   CREATININE mg/dL  0.66  0.67  0.87  0.59   GLUCOSE mg/dL  132*  166*  253*  187*   Estimated Creatinine Clearance: 44.1 mL/min (by C-G formula based on SCr of 0.66 mg/dL).  Results from last 7 days   Lab Units  12/14/18   0617  12/13/18   0628  12/12/18   0628  12/11/18   0630  12/10/18   0632  12/09/18   1931   CALCIUM mg/dL  8.5*  8.6  8.3*  8.9  9.0  9.5   ALBUMIN g/dL   --    --    --    --   3.40*  4.00   MAGNESIUM mg/dL   --    --   2.0   --    --    --          aspirin 325 mg Oral Daily   atorvastatin 10 mg Oral Daily   docusate sodium 100 mg Oral BID   donepezil 10 mg Oral Nightly   insulin lispro 0-9 Units Subcutaneous 4x Daily With Meals & Nightly   levothyroxine 75 mcg Oral Daily   metFORMIN 500 mg Oral TID   metoprolol tartrate 25 mg Oral Q12H   pantoprazole 40 mg Oral QAM   sodium chloride 3 mL Intravenous Q12H      Diet Regular; Consistent Carbohydrate, Renal, Cardiac      Assessment/Plan      Active Hospital Problems    Diagnosis Date Noted   • **Closed 2-part intertrochanteric fracture of left femur (CMS/[facility]) [S72.142A] 12/09/2018   • Fall [W19.XXXA] 12/09/2018   • Slow transit constipation [K59.01] 11/04/2015   • Type II diabetes mellitus (CMS/[facility]) [E11.9]    • Hypothyroidism [E03.9]    • Benign essential hypertension [I10] 10/12/2014   • Dementia [F03.90] 02/19/2014      Resolved Hospital Problems   No resolved problems to display.     - L Femur Fx: sp orif 12/11/18. Continue pain control. PT. ASA ppx per ortho. Orthopedic surgery following.  - Fall  - Constipation: Continue bowel regimen  - DM2: A1c 6.9. SSI.  - HTN: Stable  - Tachycardia: Resolved with increased beta blocker.  - Dementia  - Dispostion: SNF/pending precert    Camron Mcmahon MD  [facility]  12/14/18  10:01 AM

## 2018-12-14 NOTE — PLAN OF CARE
Problem: Patient Care Overview  Goal: Plan of Care Review  Outcome: Ongoing (interventions implemented as appropriate)   12/14/18 0267   Coping/Psychosocial   Plan of Care Reviewed With patient   OTHER   Outcome Summary patient completed hip exercises with tactile and verbal cueing, very rigid with bed mobility attempts, requiring maxA to get to edge of bed. Patient requires maximal assistance to stand and pivot to chair, difficulty with weight shifting and advancing feet to take any steps.

## 2018-12-14 NOTE — PLAN OF CARE
Problem: Patient Care Overview  Goal: Plan of Care Review  Outcome: Ongoing (interventions implemented as appropriate)   12/14/18 8677   Coping/Psychosocial   Plan of Care Reviewed With patient   OTHER   Outcome Summary pt denies pain, only oriented to person, sat up in chair for couple hours, hip dsg dry, except one spot of blood, vss, upon changing her brief around 1545, large blot clot came from her vagina, md notified, will ctm.     Goal: Individualization and Mutuality  Outcome: Ongoing (interventions implemented as appropriate)    Goal: Discharge Needs Assessment  Outcome: Ongoing (interventions implemented as appropriate)    Goal: Interprofessional Rounds/Family Conf  Outcome: Ongoing (interventions implemented as appropriate)      Problem: Fall Risk (Adult)  Goal: Absence of Fall  Outcome: Ongoing (interventions implemented as appropriate)      Problem: Skin Injury Risk (Adult)  Goal: Skin Health and Integrity  Outcome: Ongoing (interventions implemented as appropriate)      Problem: Fracture Orthopaedic (Adult)  Goal: Signs and Symptoms of Listed Potential Problems Will be Absent, Minimized or Managed (Fracture Orthopaedic)  Outcome: Ongoing (interventions implemented as appropriate)

## 2018-12-14 NOTE — PROGRESS NOTES
Orthopedic Progress Note      Patient: Sienna Ortiz    YOB: 1929    Medical Record Number: [id number]    Attending Physician: Camron Mcmahon MD    Date of Admission: 12/9/2018  4:33 PM    Admitting Dx:  Closed fracture of left hip, initial encounter (CMS/[facility]) [S72.002A]  Fall, initial encounter [W19.XXXA]    Status Post: No admission procedures for hospital encounter.    Post Operative Day Number: 3    Current Problem List:   Patient Active Problem List   Diagnosis   • Anemia, pernicious   • Cerebellar ataxia (CMS/[facility])   • Dementia   • Type II diabetes mellitus (CMS/[facility])   • Hyperlipidemia   • Benign essential hypertension   • Hypothyroidism   • Vitamin D deficiency   • Closed fracture of right hip with routine healing   • Slow transit constipation   • Fall   • Closed 2-part intertrochanteric fracture of left femur (CMS/[facility])         Past Medical History:   Diagnosis Date   • Anemia, pernicious 10/12/2014   • Benign essential hypertension 10/12/2014   • 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  female. Sleeping, Arouses easily.  No complaints    OBJECTIVE:   Vitals:    12/13/18 1422 12/13/18 1947 12/13/18 2318 12/14/18 0916   BP: 123/78 134/74 137/74 134/87   BP Location: Right arm Left arm Right arm Right arm   Patient Position: Sitting Lying Lying Lying   Pulse:  86 100 99   Resp: 18 18 18 18   Temp: 97.9 °F (36.6 °C) 97.9 °F (36.6 °C) 99.3 °F (37.4 °C) 98.9 °F (37.2 °C)   TempSrc: Oral Oral Oral Oral   SpO2:  95% 96% 95%   Weight:       Height:         I/O last 3 completed shifts:  In: 120 [P.O.:120]  Out: -     Current Medications:  Scheduled Meds:  aspirin 325 mg Oral Daily   atorvastatin 10 mg Oral Daily   docusate sodium 100 mg Oral BID   donepezil 10 mg Oral Nightly   insulin lispro 0-9 Units Subcutaneous 4x  Daily With Meals & Nightly   levothyroxine 75 mcg Oral Daily   metFORMIN 500 mg Oral TID With Meals   metoprolol tartrate 25 mg Oral Q12H   pantoprazole 40 mg Oral QAM   sodium chloride 3 mL Intravenous Q12H     PRN Meds:.•  acetaminophen  •  acetaminophen  •  bisacodyl  •  bisacodyl  •  dextrose  •  dextrose  •  glucagon (human recombinant)  •  HYDROcodone-acetaminophen  •  HYDROcodone-acetaminophen  •  magnesium hydroxide  •  melatonin  •  Morphine  •  Morphine **AND** naloxone  •  [COMPLETED] Insert peripheral IV **AND** sodium chloride  •  sodium chloride  •  sodium chloride    Diagnostic Tests:   Lab Results (last 24 hours)     Procedure Component Value Units Date/Time    POC Glucose Once [[id number]]  (Abnormal) Collected:  12/14/18 1129    Specimen:  Blood Updated:  12/14/18 1131     Glucose 177 mg/dL     CBC & Differential [[id number]] Collected:  12/14/18 0617    Specimen:  Blood Updated:  12/14/18 0820    Narrative:       The following orders were created for panel order CBC & Differential.  Procedure                               Abnormality         Status                     ---------                               -----------         ------                     CBC Auto Differential[[id number]]        Abnormal            Final result                 Please view results for these tests on the individual orders.    CBC Auto Differential [[id number]]  (Abnormal) Collected:  12/14/18 0617    Specimen:  Blood Updated:  12/14/18 0820     WBC 9.46 10*3/mm3      RBC 3.08 10*6/mm3      Hemoglobin 9.1 g/dL      Hematocrit 28.0 %      MCV 90.9 fL      MCH 29.5 pg      MCHC 32.5 g/dL      RDW 13.1 %      RDW-SD 43.2 fl      MPV 11.1 fL      Platelets 212 10*3/mm3      Neutrophil % 65.6 %      Lymphocyte % 23.8 %      Monocyte % 8.2 %      Eosinophil % 2.3 %      Basophil % 0.1 %      Immature Grans % 0.6 %      Neutrophils, Absolute 6.20 10*3/mm3      Lymphocytes, Absolute 2.25 10*3/mm3      Monocytes, Absolute 0.78  10*3/mm3      Eosinophils, Absolute 0.22 10*3/mm3      Basophils, Absolute 0.01 10*3/mm3      Immature Grans, Absolute 0.06 10*3/mm3      nRBC 0.0 /100 WBC     Basic Metabolic Panel [[id number]]  (Abnormal) Collected:  12/14/18 0617    Specimen:  Blood Updated:  12/14/18 0728     Glucose 132 mg/dL      BUN 14 mg/dL      Creatinine 0.66 mg/dL      Sodium 141 mmol/L      Potassium 3.5 mmol/L      Chloride 104 mmol/L      CO2 25.8 mmol/L      Calcium 8.5 mg/dL      eGFR  African Amer 102 mL/min/1.73      BUN/Creatinine Ratio 21.2     Anion Gap 11.2 mmol/L     Narrative:       The MDRD GFR formula is only valid for adults with stable renal function between ages 18 and 70.    POC Glucose Once [[id number]]  (Abnormal) Collected:  12/14/18 0553    Specimen:  Blood Updated:  12/14/18 0555     Glucose 131 mg/dL     POC Glucose Once [[id number]]  (Abnormal) Collected:  12/13/18 2042    Specimen:  Blood Updated:  12/13/18 2043     Glucose 132 mg/dL     POC Glucose Once [[id number]]  (Abnormal) Collected:  12/13/18 1643    Specimen:  Blood Updated:  12/13/18 1644     Glucose 152 mg/dL     POC Glucose Once [[id number]]  (Normal) Collected:  12/13/18 1149    Specimen:  Blood Updated:  12/13/18 1155     Glucose 122 mg/dL           PHYSICAL EXAM:  Left hip and thigh dressings intact.  Small amount of old bloody drainage noted on proximal dressing.  Calf soft and nontender.  Slow progress with PT as expected.  Pain well controlled     ASSESSMENT & PLAN:    Plan transfer to SNF today if OK with medicine.  RTO in 3 weeks to see Dr. Jorge Capone.  Continue PT for ambulation WBAT with walker at rehab.  Begin dry dressing changes next week.     Jorge Capone MD         Date: 12/14/2018    Arlyn Graham RN

## 2018-12-15 PROBLEM — N93.8 DUB (DYSFUNCTIONAL UTERINE BLEEDING): Status: ACTIVE | Noted: 2018-01-01

## 2018-12-15 NOTE — PROGRESS NOTES
Name: Sienna Ortiz ADMIT: 2018   : 3/4/1929  PCP: Sean Mayer MD    MRN: [id number] LOS: 6 days   AGE/SEX: 89 y.o. female  ROOM: [facility]/   Subjective   Prior to discharge yesterday she passed several clot and had vaginal bleeding. Discharge was cancelled and GYN consulted. US obtained and she is being monitored.    She reports no CP SOA NVD. No abdominal or pelvic pain.    Objective   Vital Signs  Temp:  [97.6 °F (36.4 °C)-100.1 °F (37.8 °C)] 98 °F (36.7 °C)  Heart Rate:  [] 111  Resp:  [16-18] 16  BP: (111-158)/(63-96) 118/96  SpO2:  [97 %] 97 %  on   ;   Device (Oxygen Therapy): room air  Body mass index is 26.57 kg/m².    Physical Exam   Constitutional: She appears well-developed. No distress.   frail   HENT:   Head: Atraumatic.   Nose: Nose normal.   Eyes: Conjunctivae and EOM are normal.   Neck: Normal range of motion. Neck supple.   Cardiovascular: Normal rate, regular rhythm and intact distal pulses.   Pulmonary/Chest: Effort normal. She has decreased breath sounds. She has no wheezes.   Abdominal: Soft. She exhibits no distension. There is no tenderness.   Musculoskeletal: She exhibits no edema or tenderness.   Neurological: She is alert. No cranial nerve deficit.   Skin: Skin is warm and dry. She is not diaphoretic.   Psychiatric: She has a normal mood and affect. Her behavior is normal. Cognition and memory are impaired.   Nursing note and vitals reviewed.      Results Review:       I reviewed the patient's new clinical results.     I reviewed imaging, agree with interpretation.     I reviewed telemetry/EKG results, sinus tachycardia.     I reviewed other test results, agree with interpretation.    Results from last 7 days   Lab Units  12/15/18   0542  1817  18   0628   WBC 10*3/mm3  12.48*  12.48*  9.46  10.16   HEMOGLOBIN g/dL  8.1*  8.6*  9.1*  8.9*   PLATELETS 10*3/mm3  244  263  212  185     Results from last 7 days   Lab Units  12/15/18   0542   12/14/18   0617  12/13/18   0628  12/12/18   0628   SODIUM mmol/L  141  141  141  141   POTASSIUM mmol/L  3.3*  3.5  3.6  3.5   CHLORIDE mmol/L  103  104  104  105   CO2 mmol/L  24.9  25.8  26.4  21.7*   BUN mg/dL  15  14  23  14   CREATININE mg/dL  0.62  0.66  0.67  0.87   GLUCOSE mg/dL  148*  132*  166*  253*   Estimated Creatinine Clearance: 44.1 mL/min (by C-G formula based on SCr of 0.62 mg/dL).  Results from last 7 days   Lab Units  12/15/18   0542  12/14/18   0617  12/13/18   0628  12/12/18   0628   12/10/18   0632  12/09/18   1931   CALCIUM mg/dL  8.3*  8.5*  8.6  8.3*   < >  9.0  9.5   ALBUMIN g/dL   --    --    --    --    --   3.40*  4.00   MAGNESIUM mg/dL   --    --    --   2.0   --    --    --     < > = values in this interval not displayed.         aspirin 325 mg Oral Daily   atorvastatin 10 mg Oral Daily   docusate sodium 100 mg Oral BID   donepezil 10 mg Oral Nightly   insulin lispro 0-9 Units Subcutaneous 4x Daily With Meals & Nightly   levothyroxine 75 mcg Oral Daily   metFORMIN 500 mg Oral TID With Meals   metoprolol tartrate 25 mg Oral Q12H   pantoprazole 40 mg Oral QAM   sodium chloride 3 mL Intravenous Q12H      Diet Regular; Consistent Carbohydrate, Renal, Cardiac      Assessment/Plan      Active Hospital Problems    Diagnosis Date Noted   • **Closed 2-part intertrochanteric fracture of left femur (CMS/HCC) [S72.142A] 12/09/2018   • DUB (dysfunctional uterine bleeding) [N93.8] 12/15/2018   • Fall [W19.XXXA] 12/09/2018   • Slow transit constipation [K59.01] 11/04/2015   • Type II diabetes mellitus (CMS/HCC) [E11.9]    • Hypothyroidism [E03.9]    • Benign essential hypertension [I10] 10/12/2014   • Dementia [F03.90] 02/19/2014      Resolved Hospital Problems   No resolved problems to display.     - L Femur Fx: sp orif 12/11/18. Continue pain control. PT. ASA ppx per ortho. Orthopedic surgery following.  - Fall  - DUB: Concerning for endometrial mass on US. Repeat HH to monitor bleeding. GYN  following.  - Constipation: Continue bowel regimen  - DM2: A1c 6.9. SSI.  - HTN: Stable  - Tachycardia: Improved with BBlocker.  - Hypokalemia: Replace. Check Mag level.  - Dementia  - Dispostion: SNF/Precert obtained, timing depends on gyn workup and hgb stability    >35 minutes time spent.    Camron Mcmahon MD  [facility]  12/15/18  12:19 PM

## 2018-12-15 NOTE — PLAN OF CARE
Problem: Patient Care Overview  Goal: Plan of Care Review  Outcome: Ongoing (interventions implemented as appropriate)   12/15/18 0411   Coping/Psychosocial   Plan of Care Reviewed With patient   Plan of Care Review   Progress no change   OTHER   Outcome Summary peripad changed 4 times, from small amount blood noted, saturated x 1, small to moderated clot noted, hgb 8.6 down from 9.1, VVS, afebrile, no c/o pain, afib on monitor, Eyes closed at long interval, resting quietly     Goal: Individualization and Mutuality  Outcome: Ongoing (interventions implemented as appropriate)    Goal: Discharge Needs Assessment  Outcome: Ongoing (interventions implemented as appropriate)    Goal: Interprofessional Rounds/Family Conf  Outcome: Ongoing (interventions implemented as appropriate)      Problem: Fall Risk (Adult)  Goal: Absence of Fall  Outcome: Ongoing (interventions implemented as appropriate)      Problem: Skin Injury Risk (Adult)  Goal: Skin Health and Integrity  Outcome: Ongoing (interventions implemented as appropriate)      Problem: Fracture Orthopaedic (Adult)  Goal: Signs and Symptoms of Listed Potential Problems Will be Absent, Minimized or Managed (Fracture Orthopaedic)  Outcome: Ongoing (interventions implemented as appropriate)

## 2018-12-15 NOTE — NURSING NOTE
After being in the chair for about an hour RN and NA went to change pt, and put back in bed. Large blood clot was noticed in brief, an vaginal bleeding.  notified, gyn consulted. Dc was cancelled, son, [facility], and [facility] were all notified of cancelled discharge. Spoke with , OBGYN, explained situation. After checking on patient around 1700, patient voided in brief, and then passed several large blood clots, and heavy vaginal bleeding. Wash cloths, applied to try to control bleeding. Spoke with  again, notified of increased bleeding/clots. Requested supplies gathered. RN at bedside for ultrasound, and pelvic exam. Patient is cooperative, vitals stable, no complaints of pain or distress throughout ordeal. Will follow MD orders, and continue to monitor.

## 2018-12-15 NOTE — PROGRESS NOTES
Orthopedic Progress Note        Patient: Sienna Ortiz    Date of Admission: 12/9/2018  4:33 PM    YOB: 1929    Medical Record Number: [id number]    Attending Physician: Camron Mcmahon MD    POD : 4    Systemic or Specific Complaints: Reports pain adequately controlled.  Tolerating po well.  No complaints or problems.       No Known Allergies    Current Medications:  Scheduled Meds:  aspirin 325 mg Oral Daily   atorvastatin 10 mg Oral Daily   docusate sodium 100 mg Oral BID   donepezil 10 mg Oral Nightly   insulin lispro 0-9 Units Subcutaneous 4x Daily With Meals & Nightly   levothyroxine 75 mcg Oral Daily   metFORMIN 500 mg Oral TID With Meals   metoprolol tartrate 25 mg Oral Q12H   pantoprazole 40 mg Oral QAM   sodium chloride 3 mL Intravenous Q12H     Continuous Infusions:   PRN Meds:.•  acetaminophen  •  acetaminophen  •  bisacodyl  •  bisacodyl  •  dextrose  •  dextrose  •  glucagon (human recombinant)  •  HYDROcodone-acetaminophen  •  HYDROcodone-acetaminophen  •  magnesium hydroxide  •  melatonin  •  Morphine  •  Morphine **AND** naloxone  •  potassium chloride **OR** potassium chloride **OR** potassium chloride  •  [COMPLETED] Insert peripheral IV **AND** sodium chloride  •  sodium chloride  •  sodium chloride      Physical Exam: 89 y.o. female    General Appearance:   Patient was resting upon arrival but awakened easily and answered questions appropriately   Vitals:    12/14/18 1921 12/14/18 2317 12/15/18 0442 12/15/18 0728   BP: 111/75 119/72 116/63 118/96   BP Location: Right arm Right arm Right arm Right arm   Patient Position: Lying Lying Lying Lying   Pulse: 120 108 111 111   Resp: 16 18 18 16   Temp: 100.1 °F (37.8 °C) 98.2 °F (36.8 °C) 98 °F (36.7 °C) 98 °F (36.7 °C)   TempSrc: Oral Axillary Oral Oral   SpO2:       Weight:       Height:              Extremities:   Left hip and leg incisions inspected and were cleared dry and intact.  There was mild swelling to the left thigh  but was soft and diminished compared with previous exam.  Left calf nontender without sign of DVT.  She was able to flex and extend her ankle and toes well.  Toes are well perfused              Diagnostic Tests:   Lab Results (last 24 hours)     Procedure Component Value Units Date/Time    Basic Metabolic Panel [[id number]]  (Abnormal) Collected:  12/15/18 0542    Specimen:  Blood Updated:  12/15/18 0717     Glucose 148 mg/dL      BUN 15 mg/dL      Creatinine 0.62 mg/dL      Sodium 141 mmol/L      Potassium 3.3 mmol/L      Chloride 103 mmol/L      CO2 24.9 mmol/L      Calcium 8.3 mg/dL      eGFR  African Amer 110 mL/min/1.73      BUN/Creatinine Ratio 24.2     Anion Gap 13.1 mmol/L     Narrative:       The MDRD GFR formula is only valid for adults with stable renal function between ages 18 and 70.    CBC & Differential [[id number]] Collected:  12/15/18 0542    Specimen:  Blood Updated:  12/15/18 0706    Narrative:       The following orders were created for panel order CBC & Differential.  Procedure                               Abnormality         Status                     ---------                               -----------         ------                     CBC Auto Differential[[id number]]        Abnormal            Final result                 Please view results for these tests on the individual orders.    CBC Auto Differential [[id number]]  (Abnormal) Collected:  12/15/18 0542    Specimen:  Blood Updated:  12/15/18 0706     WBC 12.48 10*3/mm3      RBC 2.73 10*6/mm3      Hemoglobin 8.1 g/dL      Hematocrit 25.1 %      MCV 91.9 fL      MCH 29.7 pg      MCHC 32.3 g/dL      RDW 13.3 %      RDW-SD 43.8 fl      MPV 10.8 fL      Platelets 244 10*3/mm3      Neutrophil % 72.0 %      Lymphocyte % 20.2 %      Monocyte % 7.5 %      Eosinophil % 0.2 %      Basophil % 0.1 %      Immature Grans % 0.8 %      Neutrophils, Absolute 8.99 10*3/mm3      Lymphocytes, Absolute 2.52 10*3/mm3      Monocytes, Absolute 0.93 10*3/mm3       Eosinophils, Absolute 0.03 10*3/mm3      Basophils, Absolute 0.01 10*3/mm3      Immature Grans, Absolute 0.10 10*3/mm3      nRBC 0.0 /100 WBC     POC Glucose Once [[id number]]  (Abnormal) Collected:  12/15/18 0628    Specimen:  Blood Updated:  12/15/18 0629     Glucose 146 mg/dL     POC Glucose Once [[id number]]  (Abnormal) Collected:  12/14/18 2036    Specimen:  Blood Updated:  12/14/18 2037     Glucose 183 mg/dL     Protime-INR [[id number]]  (Abnormal) Collected:  12/14/18 1953    Specimen:  Blood Updated:  12/14/18 2022     Protime 16.0 Seconds      INR 1.30    CBC (No Diff) [[id number]]  (Abnormal) Collected:  12/14/18 1953    Specimen:  Blood Updated:  12/14/18 2020     WBC 12.48 10*3/mm3      RBC 2.87 10*6/mm3      Hemoglobin 8.6 g/dL      Hematocrit 27.6 %      MCV 96.2 fL      MCH 30.0 pg      MCHC 31.2 g/dL      RDW 13.1 %      RDW-SD 45.3 fl      MPV 10.5 fL      Platelets 263 10*3/mm3     POC Glucose Once [[id number]]  (Abnormal) Collected:  12/14/18 1639    Specimen:  Blood Updated:  12/14/18 1640     Glucose 157 mg/dL     POC Glucose Once [[id number]]  (Abnormal) Collected:  12/14/18 1129    Specimen:  Blood Updated:  12/14/18 1131     Glucose 177 mg/dL                   Assessment: Status post left hip intramedullary nail  Plan:    Okay from orthopedic standpoint to transfer to [facility] when medically stable.  I will see her back in approximately 3 weeks.  She may continue with physical therapy and ambulation weightbearing as tolerated left lower extremity with walker and assistance.  We'll continue with dressing changes.    Jorge Capone MD      Date: 12/15/2018  Time: 11:10 AM    Jorge Capone MD

## 2018-12-15 NOTE — PLAN OF CARE
Problem: Patient Care Overview  Goal: Plan of Care Review  Outcome: Ongoing (interventions implemented as appropriate)   12/15/18 1502   Coping/Psychosocial   Plan of Care Reviewed With patient   Plan of Care Review   Progress no change   OTHER   Outcome Summary Pt tolerated treatment with no complaints. Pt performed sit<>stand requiring vcs to stand up tall and engage glute muscles as pt leans posteriorly. Attempted another stand but MD and nursing came in the room to see her. Put pt back to bed.

## 2018-12-15 NOTE — CONSULTS
Gyn oncology CONSULTATION    12/15/2018    Patient Identification:  Name: Sienna Ortiz Age: 89 y.o. Sex: female :  3/4/1929 MRN: [id number]       CHIEF COMPLAINT    Chief Complaint   Patient presents with   • Fall     at home. reports left hip pain.      New onset postmenopausal bleeding s/p ORIF of left femur fracture.      HISTORY OF PRESENT ILLNESS    Ms. Sienna Ortiz is a 89 y.o. female who presents to ER on 12/10/2018 s/p fall and proximal femur fracture was diagnosed.  She underwent left intertrochanteric hip fracture fixation on 2018. She developed postmenopausal bleeding.  Nursing reports changing her pad 4 times over the last 24 hours.  When the bleeding started she has some small clots but recently just spotting. She had TVUS with 2.6 cm endometrial stripe. She was seen by general gynecology and then gyn onc was consulted.  She has underlying dementia so she cannot provide any history.  Her son Abiel is her emergency contact.  They are unaware of any previous bleeding. She denies any previous bleeding or pain.  She has limited mobility due to recent proximal femur fracture and surgery,        PAST MEDICAL HISTORY    Past Medical History:   Diagnosis Date   • Anemia, pernicious 10/12/2014   • Benign essential hypertension 10/12/2014   • 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   • Dementia 2014   • Hyperlipidemia    • Hypothyroidism    • Type II diabetes mellitus (CMS/[facility])    • Vitamin D deficiency 2011    unspecified       PAST SURGICAL HISTORY    Past Surgical History:   Procedure Laterality Date   • REPLACEMENT TOTAL HIP LATERAL POSITION Right        FAMILY HISTORY  Family History   Problem Relation Age of Onset   • Hypertension Other         SOCIAL HISTORY  Social History     Socioeconomic History   • Marital status: Single     Spouse name: Not on file   • Number of children: Not on file   • Years of education: Not on file   • Highest education level: Not on file   Social Needs    • Financial resource strain: Not on file   • Food insecurity - worry: Not on file   • Food insecurity - inability: Not on file   • Transportation needs - medical: Not on file   • Transportation needs - non-medical: Not on file   Occupational History   • Not on file   Tobacco Use   • Smoking status: Never Smoker   Substance and Sexual Activity   • Alcohol use: No   • Drug use: Not on file   • Sexual activity: Not on file   Other Topics Concern   • Not on file   Social History Narrative   • Not on file       MEDICATIONS    Current Facility-Administered Medications   Medication Dose Route Frequency Provider Last Rate Last Dose   • acetaminophen (TYLENOL) tablet 325 mg  325 mg Oral Q4H PRN Jorge Capone MD       • acetaminophen (TYLENOL) tablet 650 mg  650 mg Oral Q4H PRN Margaret Israel MD       • aspirin EC tablet 325 mg  325 mg Oral Daily Jorge Capone MD   325 mg at 12/15/18 0909   • atorvastatin (LIPITOR) tablet 10 mg  10 mg Oral Daily Jorge Capone MD   10 mg at 12/15/18 0909   • bisacodyl (DULCOLAX) EC tablet 5 mg  5 mg Oral Daily PRN Camron Mcmahon MD   5 mg at 12/14/18 0916   • bisacodyl (DULCOLAX) suppository 10 mg  10 mg Rectal Daily PRN Jorge Capone MD       • dextrose (D50W) 25 g/ 50mL Intravenous Solution 25 g  25 g Intravenous Q15 Min PRN Margaret Israel MD       • dextrose (GLUTOSE) oral gel 15 g  15 g Oral Q15 Min PRN Margaret Israel MD       • docusate sodium (COLACE) capsule 100 mg  100 mg Oral BID Jorge Capone MD   100 mg at 12/15/18 0909   • donepezil (ARICEPT) tablet 10 mg  10 mg Oral Nightly Jorge Capone MD   10 mg at 12/14/18 2157   • glucagon (human recombinant) (GLUCAGEN DIAGNOSTIC) injection 1 mg  1 mg Subcutaneous PRN Margaret Israel MD       • HYDROcodone-acetaminophen (NORCO) 5-325 MG per tablet 1 tablet  1 tablet Oral Q4H PRN Jorge Capone MD       • HYDROcodone-acetaminophen (NORCO) 5-325 MG per tablet 2  tablet  2 tablet Oral Q4H PRN Jorge Capone MD       • insulin lispro (humaLOG) injection 0-9 Units  0-9 Units Subcutaneous 4x Daily With Meals & Nightly Margaret Israel MD   2 Units at 12/14/18 2157   • levothyroxine (SYNTHROID, LEVOTHROID) tablet 75 mcg  75 mcg Oral Daily Jorge Capone MD   75 mcg at 12/15/18 0628   • magnesium hydroxide (MILK OF MAGNESIA) suspension 2400 mg/10mL 10 mL  10 mL Oral Daily PRN Jorge Capone MD       • melatonin tablet 1 mg  1 mg Oral Nightly PRN Jorge Capone MD       • metFORMIN (GLUCOPHAGE) tablet 500 mg  500 mg Oral TID With Meals Jorge Capone MD   500 mg at 12/15/18 1228   • metoprolol tartrate (LOPRESSOR) tablet 25 mg  25 mg Oral Q12H Jorge Capone MD   25 mg at 12/15/18 0909   • morphine injection 2 mg  2 mg Intravenous Q4H PRN Margaret Israel MD   2 mg at 12/11/18 2031   • morphine injection 4 mg  4 mg Intravenous Q2H PRN Jorge Capone MD        And   • naloxone (NARCAN) injection 0.4 mg  0.4 mg Intravenous Q5 Min PRN Jorge Capone MD       • pantoprazole (PROTONIX) EC tablet 40 mg  40 mg Oral QAM Jorge Capone MD   40 mg at 12/15/18 0628   • potassium chloride (MICRO-K) CR capsule 40 mEq  40 mEq Oral PRN Camron Mcmahon MD   40 mEq at 12/15/18 1228    Or   • potassium chloride (KLOR-CON) packet 40 mEq  40 mEq Oral PRN Camron Mcmahon MD        Or   • potassium chloride 10 mEq in 100 mL IVPB  10 mEq Intravenous Q1H PRN Camron Mcmahon MD       • sodium chloride 0.9 % flush 10 mL  10 mL Intravenous PRN Desire Recio MD       • sodium chloride 0.9 % flush 3 mL  3 mL Intravenous Q12H Jorge Capone MD   3 mL at 12/15/18 0910   • sodium chloride 0.9 % flush 3-10 mL  3-10 mL Intravenous PRN Margaret Israel MD       • sodium chloride 0.9 % flush 3-10 mL  3-10 mL Intravenous PRN Jorge Capone MD            ALLERGIES   No Known  "Allergies    IMMUNIZATIONS   Immunization History   Administered Date(s) Administered   • Flu Mist 10/01/2013   • flucelvax quad pfs =>4 YRS 12/10/2018       Review of Systems    Review of Systems - History obtained from the patient and her son Abiel  General ROS: negative for - chills, fever, hot flashes, malaise or weight gain  Psychological ROS: positive for - dementia  negative for - anxiety, behavioral disorder or depression  ENT ROS: negative for - headaches, nasal congestion, nasal discharge or vertigo  Hematological and Lymphatic ROS: positive for - bleeding problems  negative for - blood clots, bruising, fatigue, night sweats or swollen lymph nodes  Breast ROS: negative for - galactorrhea, new or changing breast lumps, nipple changes or nipple discharge  Respiratory ROS: no cough, shortness of breath, or wheezing  Cardiovascular ROS: no chest pain or dyspnea on exertion  Gastrointestinal ROS: no abdominal pain, change in bowel habits, or black or bloody stools  Genito-Urinary ROS: no dysuria, trouble voiding, or hematuria  Musculoskeletal ROS: positive for - gait disturbance and recent femur fracture  negative for - joint swelling, muscle pain, muscular weakness, positive for some  pain in hip - left   Neurological ROS: positive for - confusion, memory loss and dementia  negative for - headaches, numbness/tingling, seizures, tremors or visual changes  Dermatological ROS: negative    PHYSICIAL EXAM    /48 (BP Location: Right arm, Patient Position: Lying)   Pulse 114   Temp 97.7 °F (36.5 °C) (Oral)   Resp 16   Ht 160 cm (63\")   Wt 68 kg (150 lb)   SpO2 97%   BMI 26.57 kg/m²   Physical Examination: General appearance - alert, well appearing, and in no distress  Neck - supple, no significant adenopathy  Chest - clear to auscultation, no wheezes, rales or rhonchi, symmetric air entry  Heart - normal rate, regular rhythm, normal S1, S2, no murmurs, rubs, clicks or gallops  Abdomen - soft, nontender, " nondistended, no masses or organomegaly  Pelvic - normal external genitalia, vulva, vagina, cervix, uterus and adnexa, VULVA: normal appearing vulva with no masses, tenderness or lesions, VAGINA: no palpable lesions. Scant blood present CERVIX: palpably normal cervix without discharge or lesions, UTERUS: uterus is normal size, shape, consistency and nontender, ADNEXA: normal adnexa in size, nontender and no masses, RECTAL: normal rectal, no masses  Extremities - peripheral pulses normal, no pedal edema, no clubbing or cyanosis, dressings over surgical sites left hip and thigh    LABORATORY  Recent Results (from the past 24 hour(s))   POC Glucose Once    Collection Time: 12/14/18  4:39 PM   Result Value Ref Range    Glucose 157 (H) 70 - 130 mg/dL   CBC (No Diff)    Collection Time: 12/14/18  7:53 PM   Result Value Ref Range    WBC 12.48 (H) 4.50 - 10.70 10*3/mm3    RBC 2.87 (L) 3.90 - 5.20 10*6/mm3    Hemoglobin 8.6 (L) 11.9 - 15.5 g/dL    Hematocrit 27.6 (L) 35.6 - 45.5 %    MCV 96.2 80.5 - 98.2 fL    MCH 30.0 26.9 - 32.0 pg    MCHC 31.2 (L) 32.4 - 36.3 g/dL    RDW 13.1 (H) 11.7 - 13.0 %    RDW-SD 45.3 37.0 - 54.0 fl    MPV 10.5 6.0 - 12.0 fL    Platelets 263 140 - 500 10*3/mm3   Protime-INR    Collection Time: 12/14/18  7:53 PM   Result Value Ref Range    Protime 16.0 (H) 11.7 - 14.2 Seconds    INR 1.30 (H) 0.90 - 1.10   POC Glucose Once    Collection Time: 12/14/18  8:36 PM   Result Value Ref Range    Glucose 183 (H) 70 - 130 mg/dL   Basic Metabolic Panel    Collection Time: 12/15/18  5:42 AM   Result Value Ref Range    Glucose 148 (H) 65 - 99 mg/dL    BUN 15 8 - 23 mg/dL    Creatinine 0.62 0.57 - 1.00 mg/dL    Sodium 141 136 - 145 mmol/L    Potassium 3.3 (L) 3.5 - 5.2 mmol/L    Chloride 103 98 - 107 mmol/L    CO2 24.9 22.0 - 29.0 mmol/L    Calcium 8.3 (L) 8.6 - 10.5 mg/dL    eGFR  African Amer 110 >60 mL/min/1.73    BUN/Creatinine Ratio 24.2 7.0 - 25.0    Anion Gap 13.1 mmol/L   CBC Auto Differential     Collection Time: 12/15/18  5:42 AM   Result Value Ref Range    WBC 12.48 (H) 4.50 - 10.70 10*3/mm3    RBC 2.73 (L) 3.90 - 5.20 10*6/mm3    Hemoglobin 8.1 (L) 11.9 - 15.5 g/dL    Hematocrit 25.1 (L) 35.6 - 45.5 %    MCV 91.9 80.5 - 98.2 fL    MCH 29.7 26.9 - 32.0 pg    MCHC 32.3 (L) 32.4 - 36.3 g/dL    RDW 13.3 (H) 11.7 - 13.0 %    RDW-SD 43.8 37.0 - 54.0 fl    MPV 10.8 6.0 - 12.0 fL    Platelets 244 140 - 500 10*3/mm3    Neutrophil % 72.0 42.7 - 76.0 %    Lymphocyte % 20.2 19.6 - 45.3 %    Monocyte % 7.5 5.0 - 12.0 %    Eosinophil % 0.2 (L) 0.3 - 6.2 %    Basophil % 0.1 0.0 - 1.5 %    Immature Grans % 0.8 (H) 0.0 - 0.5 %    Neutrophils, Absolute 8.99 (H) 1.90 - 8.10 10*3/mm3    Lymphocytes, Absolute 2.52 0.90 - 4.80 10*3/mm3    Monocytes, Absolute 0.93 0.20 - 1.20 10*3/mm3    Eosinophils, Absolute 0.03 0.00 - 0.70 10*3/mm3    Basophils, Absolute 0.01 0.00 - 0.20 10*3/mm3    Immature Grans, Absolute 0.10 (H) 0.00 - 0.03 10*3/mm3    nRBC 0.0 0.0 - 0.0 /100 WBC   POC Glucose Once    Collection Time: 12/15/18  6:28 AM   Result Value Ref Range    Glucose 146 (H) 70 - 130 mg/dL   POC Glucose Once    Collection Time: 12/15/18 11:30 AM   Result Value Ref Range    Glucose 177 (H) 70 - 130 mg/dL       Imaging & OTHER STUDIES    Xr Knee 1 Or 2 View Left    Result Date: 12/10/2018  HISTORY: Fell, left knee pain, left hip pain. Possible pneumonia.  DATE OF STUDY:  12/09/2018  LEFT KNEE TWO VIEWS  There is tibiofemoral joint space narrowing. There is some chondrocalcinosis of the lateral meniscus. Bones are demineralized. No fractures are seen.      Degenerative disease of the left knee with no acute process identified.  AP PELVIS AND LEFT HIP  There is an intertrochanteric fracture of the left hip showing mild displacement. Femoral head articulates with the acetabulum. No other fractures of the pelvis are seen.  Right hip prosthesis is seen in good position.  IMPRESSION:  Intertrochanteric left hip fracture.  AP CHEST  Heart  size is within normal limits. Lungs are underinflated with some elevation of the right hemidiaphragm. No focal infiltrates are seen. There is vascular crowding. Prominent soft tissue is seen in the medial aspect of the right apex, likely related to ectatic vascular structures and similar to the 09/05/2015 study.  IMPRESSION:  No acute process.  This report was finalized on 12/10/2018 7:50 AM by Dr. Tom Guerra M.D.      Ct Head Without Contrast    Result Date: 12/9/2018  CRANIAL CT SCAN WITHOUT CONTRAST  CLINICAL HISTORY: trauma  COMPARISON: None.  TECHNIQUE: Radiation dose reduction techniques were utilized, including automated exposure control and exposure modulation based on body size. Multiple axial images of the head were obtained without contrast.  FINDINGS:  There is a 10 mm heavily calcified extra-axial mass near the left frontal pole on image 21, likely meningioma. There is no associated mass effect. Mild atrophy. There are moderately extensive scattered areas of decreased density in the white matter likely related to chronic ischemic gliotic changes.    Ventricles, sulci, and cisterns appear normal. Bone window images are unremarkable.         1. No acute intracranial abnormality.         This report was finalized on 12/9/2018 7:40 PM by Anthony Ace M.D.      Us Non-ob Transvaginal    Result Date: 12/14/2018  PELVIC ULTRASOUND WITH TRANSABDOMINAL AND TRANSVAGINAL IMAGING  CLINICAL HISTORY: 89-year-old female with vaginal bleeding.  Multiple transabdominal and transvaginal images were obtained. The uterus is normal in size and shape for postmenopausal female. Measures 8.7 x 4.1 x 5.0 cm. There is echogenic material distending the endometrial cavity that demonstrates evidence of blood flow on the Doppler images. This measures up to 2.6 cm in thickness. The findings are suspicious for an endometrial carcinoma. The ovaries could not be located. There is no free fluid in the cul-de-sac.  This report  was finalized on 12/14/2018 8:04 PM by Dr. Jony Deshpande M.D.      Xr Chest 1 View    Result Date: 12/10/2018  HISTORY: Fell, left knee pain, left hip pain. Possible pneumonia.  DATE OF STUDY:  12/09/2018  LEFT KNEE TWO VIEWS  There is tibiofemoral joint space narrowing. There is some chondrocalcinosis of the lateral meniscus. Bones are demineralized. No fractures are seen.      Degenerative disease of the left knee with no acute process identified.  AP PELVIS AND LEFT HIP  There is an intertrochanteric fracture of the left hip showing mild displacement. Femoral head articulates with the acetabulum. No other fractures of the pelvis are seen.  Right hip prosthesis is seen in good position.  IMPRESSION:  Intertrochanteric left hip fracture.  AP CHEST  Heart size is within normal limits. Lungs are underinflated with some elevation of the right hemidiaphragm. No focal infiltrates are seen. There is vascular crowding. Prominent soft tissue is seen in the medial aspect of the right apex, likely related to ectatic vascular structures and similar to the 09/05/2015 study.  IMPRESSION:  No acute process.  This report was finalized on 12/10/2018 7:50 AM by Dr. Tom Guerra M.D.      Fl C Arm During Surgery    Result Date: 12/11/2018  This procedure was auto-finalized with no dictation required.    Xr Hip With Or Without Pelvis 2 - 3 View Left    Result Date: 12/11/2018  2 VIEW PORTABLE LEFT HIP IN OR  HISTORY: Internal fixation of fracture.  Imaging in the operating room shows internal fixation of an intertrochanteric fracture with a long intramedullary magen and intersecting screw and the alignment appears satisfactory. 6 images were obtained and the fluoroscopy time measures 1 minute and 5 seconds.  This report was finalized on 12/11/2018 5:34 PM by Dr. Mk Kate M.D.      Xr Hip With Or Without Pelvis 2 - 3 View Left    Result Date: 12/10/2018  HISTORY: Fell, left knee pain, left hip pain. Possible pneumonia.  DATE  OF STUDY:  12/09/2018  LEFT KNEE TWO VIEWS  There is tibiofemoral joint space narrowing. There is some chondrocalcinosis of the lateral meniscus. Bones are demineralized. No fractures are seen.      Degenerative disease of the left knee with no acute process identified.  AP PELVIS AND LEFT HIP  There is an intertrochanteric fracture of the left hip showing mild displacement. Femoral head articulates with the acetabulum. No other fractures of the pelvis are seen.  Right hip prosthesis is seen in good position.  IMPRESSION:  Intertrochanteric left hip fracture.  AP CHEST  Heart size is within normal limits. Lungs are underinflated with some elevation of the right hemidiaphragm. No focal infiltrates are seen. There is vascular crowding. Prominent soft tissue is seen in the medial aspect of the right apex, likely related to ectatic vascular structures and similar to the 09/05/2015 study.  IMPRESSION:  No acute process.  This report was finalized on 12/10/2018 7:50 AM by Dr. Tom Guerra M.D.      aSSESSMENT    Closed 2-part intertrochanteric fracture of left femur (CMS/HCC)    Dementia    Type II diabetes mellitus (CMS/HCC)    Benign essential hypertension    Hypothyroidism    Slow transit constipation    Fall   Postmenopausal bleeding  THickened endometrial stripe        PLAN      I discussed with Sienna's son Abiel the findings on ultrasound and new onset of bleeding. We discussed differential diagnosis includes benign polyp, clots, endometrial precancer or endometrial cancer.  We discussed she currently is having minimal bleeding.  Hemoglobin is 8.  We discussed we are limited by her dementia, underlying medical issues, decreased mobility due to recent femur fracture and surgery.  She would not tolerate a biopsy while awake to confirm a diagnosis.  We discussed anesthesia could worsen her symptoms of dementia/confusion.  We discussed options include no intervention, consideration of treatment with megace which  is a progesterone we use to treat uterine precancers and cancers versus hysteroscopy D&C versus hysterectomy-BSO.  We discussed I am concerned with her recent fracture and surgery as well as her dementia and underlying medical issues that she would not tolerate a hysterectomy-BSO.  We discussed her mobility may limit her positioning for a hysteroscopy D&C.  We discussed risks and benefits of megace including continued bleeding, increased appetite, weight gain, risk of depression, risk of blood clot.  We discussed she is on aspirin which could decrease that risk. We discussed that megace is used in patients who have endometrial cancer that are not surgical candidates. She is currently anemic but not bleeding heavily.  After discussion he feels most comfortable proceeding with a trial of megace.  We discussed if she did not tolerate it we could stop it easily.  We discussed she could develop heavy bleeding regardless of megace and if that were to happen she may require transfusion and in that situation I would recommend attempting hysteroscopy D&C to try to control bleeding and get a diagnosis without having the extended anesthesia or morbidity of a hysterectomy. In that situation if a diagnosis of cancer were made and bleeding continued radiation could be done to control bleeding and manage symptoms.  He desires to proceed with megace.  We will start megace 40 mg po BID for now and monitor.  We will follow with you. Thank you for the consult.     Electronically signed by:  Olive Martin MD 12/15/2018 3:15 PM

## 2018-12-15 NOTE — THERAPY TREATMENT NOTE
Acute Care - Physical Therapy Treatment Note  [facility]     Patient Name: Sienna Ortiz  : 3/4/1929  MRN: [id number]  Today's Date: 12/15/2018  Onset of Illness/Injury or Date of Surgery: 18          Admit Date: 2018    Visit Dx:    ICD-10-CM ICD-9-CM   1. Fall, initial encounter W19.XXXA E888.9   2. Closed fracture of left hip, initial encounter (CMS/[facility]) S72.002A 820.8   3. Essential hypertension I10 401.9   4. 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[facility])   • Dementia   • Type II diabetes mellitus (CMS/[facility])   • Hyperlipidemia   • Benign essential hypertension   • Hypothyroidism   • Vitamin D deficiency   • Closed fracture of right hip with routine healing   • Slow transit constipation   • Fall   • Closed 2-part intertrochanteric fracture of left femur (CMS/[facility])   • DUB (dysfunctional uterine bleeding)       Therapy Treatment    Rehabilitation Treatment Summary     Row Name 12/15/18 1448             Treatment Time/Intention    Discipline  physical therapy assistant  -      Document Type  therapy note (daily note)  -      Subjective Information  no complaints  -EH      Mode of Treatment  physical therapy  -      Patient/Family Observations  Pt supine in bed  -EH      Care Plan Review  patient/other agree to care plan  -      Therapy Frequency (PT Clinical Impression)  daily  -EH      Patient Effort  adequate  -      Existing Precautions/Restrictions  fall  -EH      Recorded by [EH] Therese Florence PTA 12/15/18 1502      Row Name 12/15/18 1448             Cognitive Assessment/Intervention- PT/OT    Orientation Status (Cognition)  oriented to;person;place  -EH      Follows Commands (Cognition)  follows one step commands;75-90% accuracy;delayed response/completion  -EH      Recorded by [] Therese Florence PTA 12/15/18 1502      Row Name 12/15/18 1448             Bed Mobility Assessment/Treatment     Supine-Sit [place] (Bed Mobility)  moderate assist (50% patient effort);2 person assist  -      Sit-Supine [place] (Bed Mobility)  moderate assist (50% patient effort);2 person assist  -      Assistive Device (Bed Mobility)  draw sheet;head of bed elevated  -      Recorded by [] Therese Florence, PTA 12/15/18 1502      Row Name 12/15/18 1448             Sit-Stand Transfer    Sit-Stand [place] (Transfers)  moderate assist (50% patient effort);verbal cues  -      Assistive Device (Sit-Stand Transfers)  walker, front-wheeled  -      Recorded by [] Therese Florence, GERONIMO 12/15/18 1502      Row Name 12/15/18 1448             Stand-Sit Transfer    Stand-Sit [place] (Transfers)  moderate assist (50% patient effort)  -      Assistive Device (Stand-Sit Transfers)  walker, front-wheeled  -      Recorded by [] Therese Florence, GERONIMO 12/15/18 1502      Row Name 12/15/18 1448             Positioning and Restraints    Pre-Treatment Position  in bed  -      Post Treatment Position  bed  -      In Bed  supine;call light within reach;encouraged to call for assist;with other staff;with nsg  -      Recorded by [] Therese Florence, PTA 12/15/18 1502      Row Name                Wound 12/11/18 1600 Left hip incision    Wound - Properties Group Date first assessed: 12/11/18 [MB] Time first assessed: 1600 [MB] Side: Left [MB] Location: hip [MB] Type: incision [MB] Recorded by:  [MB] Mariia Diane RN 12/11/18 1600      User Key  (r) = Recorded By, (t) = Taken By, (c) = Cosigned By    Initials Name Effective Dates Discipline    MB Mariia Diane RN 06/16/16 -  Nurse     Therese Florence, PTA 08/19/18 -  PT          Wound 12/11/18 1600 Left hip incision (Active)   Dressing Appearance dry;intact 12/15/2018  2:05 PM   Closure Staples 12/15/2018 11:26 AM   Base dressing in place, unable to visualize 12/15/2018  2:05 PM   Drainage Characteristics/Odor serosanguineous;other (see comments) 12/15/2018 11:26 AM    Drainage Amount none 12/15/2018  2:05 PM   Dressing Care, Wound dressing applied;dressing changed;gauze;transparent film 12/15/2018 11:26 AM           Physical Therapy Education     Title: PT OT SLP Therapies (In Progress)     Topic: Physical Therapy (In Progress)     Point: Mobility training (In Progress)     Learning Progress Summary           Patient Acceptance, E, NR by  at 12/15/2018  3:00 PM    Acceptance, E, NR by EM at 12/14/2018  2:25 PM    Nonacceptance, E, NR by EM at 12/13/2018  3:36 PM    Acceptance, E, NR by EM at 12/12/2018 11:42 AM                   Point: Home exercise program (In Progress)     Learning Progress Summary           Patient Acceptance, E, NR by  at 12/15/2018  3:00 PM    Acceptance, E, NR by EM at 12/14/2018  2:25 PM    Acceptance, E, NR by EM at 12/12/2018 11:42 AM                   Point: Body mechanics (In Progress)     Learning Progress Summary           Patient Acceptance, E, NR by  at 12/15/2018  3:00 PM                   Point: Precautions (In Progress)     Learning Progress Summary           Patient Acceptance, E, NR by  at 12/15/2018  3:00 PM                               User Key     Initials Effective Dates Name Provider Type Discipline     04/03/18 -  Danya Monae PT Physical Therapist PT     08/19/18 -  Therese Florence PTA Physical Therapy Assistant PT                PT Recommendation and Plan  Therapy Frequency (PT Clinical Impression): daily  Plan of Care Reviewed With: patient  Progress: no change  Outcome Summary: Pt tolerated treatment with no complaints. Pt performed sit<>stand requiring vcs to stand up tall and engage glute muscles as pt leans posteriorly. Attempted another stand but MD and nursing came in the room to see her. Put pt back to bed.   Outcome Measures     Row Name 12/15/18 1500 12/14/18 1400 12/13/18 1500       How much help from another person do you currently need...    Turning from your back to your side while in flat bed  without using bedrails?  2  -  2  -EM  2  -EM    Moving from lying on back to sitting on the side of a flat bed without bedrails?  2  -EH  2  -EM  2  -EM    Moving to and from a bed to a chair (including a wheelchair)?  2  -EH  2  -EM  2  -EM    Standing up from a chair using your arms (e.g., wheelchair, bedside chair)?  2  -EH  2  -EM  2  -EM    Climbing 3-5 steps with a railing?  1  -  1  -EM  1  -EM    To walk in hospital room?  1  -EH  1  -EM  1  -EM    AM-PAC 6 Clicks Score  10  -EH  10  -EM  10  -EM      User Key  (r) = Recorded By, (t) = Taken By, (c) = Cosigned By    Initials Name Provider Type     Danya Monae, PT Physical Therapist     Therese Florence PTA Physical Therapy Assistant         Time Calculation:   PT Charges     Row Name 12/15/18 1459             Time Calculation    Start Time  1448  -      Stop Time  1458  -      Time Calculation (min)  10 min  -      PT Received On  12/15/18  -      PT - Next Appointment  12/16/18  -         Time Calculation- PT    Total Timed Code Minutes- PT  10 minute(s)  -        User Key  (r) = Recorded By, (t) = Taken By, (c) = Cosigned By    Initials Name Provider Type    Therese Sumner PTA Physical Therapy Assistant        Therapy Suggested Charges     Code   Minutes Charges    None           Therapy Charges for Today     Code Description Service Date Service Provider Modifiers Qty    10910918108 HC PT THER PROC EA 15 MIN 12/15/2018 Therese Florence PTA GP 1    45613947891 HC PT THER SUPP EA 15 MIN 12/15/2018 Therese Florence PTA GP 1          PT G-Codes  AM-PAC 6 Clicks Score: 10    Therese Florence PTA  12/15/2018

## 2018-12-15 NOTE — PROGRESS NOTES
DAILY PROGRESS NOTE    Patient Identification:  Name: Sienna Ortiz  Age: 89 y.o.  Sex: female  :  3/4/1929  MRN: [id number]               Subjective:  Interval History: 89-year-old female with new onset postmenopausal bleeding currently having mild bleeding this morning but occasionally noted to pass clots.  Attempt at pelvic exam at bedside yesterday Dr. Callejas unsuccessful due to patient discomfort and disorientation.  Hemoglobin performed yesterday was 8.6 and it is 8.1 today.  A pelvic ultrasound performed last night showed a 2.6 cm endometrial mass with positive blood flow very suspicious for endometrial carcinoma.    Objective:    Scheduled Meds:  aspirin 325 mg Oral Daily   atorvastatin 10 mg Oral Daily   docusate sodium 100 mg Oral BID   donepezil 10 mg Oral Nightly   insulin lispro 0-9 Units Subcutaneous 4x Daily With Meals & Nightly   levothyroxine 75 mcg Oral Daily   metFORMIN 500 mg Oral TID With Meals   metoprolol tartrate 25 mg Oral Q12H   pantoprazole 40 mg Oral QAM   sodium chloride 3 mL Intravenous Q12H     Continuous Infusions:   PRN Meds:•  acetaminophen  •  acetaminophen  •  bisacodyl  •  bisacodyl  •  dextrose  •  dextrose  •  glucagon (human recombinant)  •  HYDROcodone-acetaminophen  •  HYDROcodone-acetaminophen  •  magnesium hydroxide  •  melatonin  •  Morphine  •  Morphine **AND** naloxone  •  potassium chloride **OR** potassium chloride **OR** potassium chloride  •  [COMPLETED] Insert peripheral IV **AND** sodium chloride  •  sodium chloride  •  sodium chloride    Vital signs in last 24 hours:  Temp:  [97.6 °F (36.4 °C)-100.1 °F (37.8 °C)] 98 °F (36.7 °C)  Heart Rate:  [] 111  Resp:  [16-18] 16  BP: (111-158)/(63-96) 118/96    Intake/Output:    Intake/Output Summary (Last 24 hours) at 12/15/2018 1143  Last data filed at 2018 1300  Gross per 24 hour   Intake 120 ml   Output --   Net 120 ml       Exam:  No examination performed today although mild vaginal bleeding her  discussion with nursing staff.  Patient has had no evidence of heavy bleeding.                               Data Review:    Lab Results (last 24 hours)     Procedure Component Value Units Date/Time    POC Glucose Once [[id number]]  (Abnormal) Collected:  12/15/18 1130    Specimen:  Blood Updated:  12/15/18 1133     Glucose 177 mg/dL     Basic Metabolic Panel [[id number]]  (Abnormal) Collected:  12/15/18 0542    Specimen:  Blood Updated:  12/15/18 0717     Glucose 148 mg/dL      BUN 15 mg/dL      Creatinine 0.62 mg/dL      Sodium 141 mmol/L      Potassium 3.3 mmol/L      Chloride 103 mmol/L      CO2 24.9 mmol/L      Calcium 8.3 mg/dL      eGFR  African Amer 110 mL/min/1.73      BUN/Creatinine Ratio 24.2     Anion Gap 13.1 mmol/L     Narrative:       The MDRD GFR formula is only valid for adults with stable renal function between ages 18 and 70.    CBC & Differential [[id number]] Collected:  12/15/18 0542    Specimen:  Blood Updated:  12/15/18 0706    Narrative:       The following orders were created for panel order CBC & Differential.  Procedure                               Abnormality         Status                     ---------                               -----------         ------                     CBC Auto Differential[[id number]]        Abnormal            Final result                 Please view results for these tests on the individual orders.    CBC Auto Differential [[id number]]  (Abnormal) Collected:  12/15/18 0542    Specimen:  Blood Updated:  12/15/18 0706     WBC 12.48 10*3/mm3      RBC 2.73 10*6/mm3      Hemoglobin 8.1 g/dL      Hematocrit 25.1 %      MCV 91.9 fL      MCH 29.7 pg      MCHC 32.3 g/dL      RDW 13.3 %      RDW-SD 43.8 fl      MPV 10.8 fL      Platelets 244 10*3/mm3      Neutrophil % 72.0 %      Lymphocyte % 20.2 %      Monocyte % 7.5 %      Eosinophil % 0.2 %      Basophil % 0.1 %      Immature Grans % 0.8 %      Neutrophils, Absolute 8.99 10*3/mm3      Lymphocytes, Absolute 2.52  10*3/mm3      Monocytes, Absolute 0.93 10*3/mm3      Eosinophils, Absolute 0.03 10*3/mm3      Basophils, Absolute 0.01 10*3/mm3      Immature Grans, Absolute 0.10 10*3/mm3      nRBC 0.0 /100 WBC     POC Glucose Once [[id number]]  (Abnormal) Collected:  12/15/18 0628    Specimen:  Blood Updated:  12/15/18 0629     Glucose 146 mg/dL     POC Glucose Once [[id number]]  (Abnormal) Collected:  12/14/18 2036    Specimen:  Blood Updated:  12/14/18 2037     Glucose 183 mg/dL     Protime-INR [[id number]]  (Abnormal) Collected:  12/14/18 1953    Specimen:  Blood Updated:  12/14/18 2022     Protime 16.0 Seconds      INR 1.30    CBC (No Diff) [[id number]]  (Abnormal) Collected:  12/14/18 1953    Specimen:  Blood Updated:  12/14/18 2020     WBC 12.48 10*3/mm3      RBC 2.87 10*6/mm3      Hemoglobin 8.6 g/dL      Hematocrit 27.6 %      MCV 96.2 fL      MCH 30.0 pg      MCHC 31.2 g/dL      RDW 13.1 %      RDW-SD 45.3 fl      MPV 10.5 fL      Platelets 263 10*3/mm3     POC Glucose Once [[id number]]  (Abnormal) Collected:  12/14/18 1639    Specimen:  Blood Updated:  12/14/18 1640     Glucose 157 mg/dL         Assessment:    Closed 2-part intertrochanteric fracture of left femur (CMS/HCC)    Dementia    Type II diabetes mellitus (CMS/HCC)    Benign essential hypertension    Hypothyroidism    Slow transit constipation    Fall    1.  New onset postmenopausal bleeding with ultrasound suspicious endometrial carcinoma.    Plan:  1.  Due to this suspicious endometrial mass with blood flow consistent with very possible endometrial carcinoma in a patient with numerous comorbidities including dementia and recent surgery for hip fracture, will consult GYN oncology.  Questions which exist at this time are whether at this point she should be treated medically with Megace alone if she is deemed a very poor surgical risk, and if she does have surgery whether she should have a D&C or proceed with hysterectomy rather than facing the risks of 2 operative  procedures.  Patient obviously will not tolerate an endometrial biopsy that is why we are bringing in GYN oncology so early  In her evaluation, especially in light of a very suspicious ultrasound.  We'll continue to follow..     Casimiro Suarez MD  12/15/2018

## 2018-12-15 NOTE — CONSULTS
Gynecology Consultationo Note    Patient name: Sienna Ortiz  Age: 89 y.o.  Sex: female  YOB: 1929   MRN: [id number]  Admission Date: 12/9/2018    Chief Complaint   Patient presents with   • Fall     at home. reports left hip pain.     Reason for consult: vaginal bleeding  Subjective:    Sienna Ortiz is a 89 y.o. female with past medical history of pernicious anemia, hypertension, cerebellar ataxia, T2DM who presented to hospital after fall and underwent left hip fracture fixation on 12/11.  She is now POD 3 and tonight had sudden vaginal bleeding while urinating.  This was witnessed by RN who reports that initial episode was between 1500 and 1600 with some clots.  She then had a larger episode of 5-10 clots around 1700.  Since then, she has had small amounts of pad.      Pt is poor historian secondary to dementia.  I spoke with her son, Abiel, who reports that she has not had vaginal bleeding at home.  He does not know of any significant gynecologic history. The patient reports she has had two babies.  Does not know when she went through menopause or when her last visit to a gynecologist was.      Past Medical History:   Diagnosis Date   • Anemia, pernicious 10/12/2014   • Benign essential hypertension 10/12/2014   • 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   • Dementia 02/19/2014   • Hyperlipidemia    • Hypothyroidism    • Type II diabetes mellitus (CMS/HCC)    • Vitamin D deficiency 07/26/2011    unspecified     Past Surgical History:   Procedure Laterality Date   • REPLACEMENT TOTAL HIP LATERAL POSITION Right      Social History     Tobacco Use   • Smoking status: Never Smoker   Substance Use Topics   • Alcohol use: No   • Drug use: Not on file     Family History   Problem Relation Age of Onset   • Hypertension Other         OB History   No data available      Review of Systems - History obtained from unobtainable from patient due to mental status    Objective:   Temp:  [97.6 °F (36.4 °C)-99.3 °F  (37.4 °C)] 97.6 °F (36.4 °C)  Heart Rate:  [] 99  Resp:  [16-18] 16  BP: (134-158)/(74-87) 158/86    Exam:     Physical Examination: General appearance - alert, well appearing, and in no distress  Mental status - alert, oriented to person, place, and time  Eyes - sclera anicteric, left eye normal, right eye normal  Ears - right ear normal, left ear normal  Neck - normal ROM  Chest - clear to auscultation, no wheezes, rales or rhonchi, symmetric air entry  Heart - normal rate and regular rhythm  Abdomen - soft, nontender, gravid,   Pelvic - normal external genitalia, <10mL dark red blood, exam limited by patient cooperation.  No evidence of active bleeding.  Neurological - alert, oriented, normal speech, no focal findings or movement disorder noted  Musculoskeletal - no joint tenderness, deformity or swelling  Extremities - no pedal edema noted  Skin - normal coloration and turgor, no rashes, no suspicious skin lesions noted    Labs:  Lab Results   Component Value Date    WBC 9.46 12/14/2018    HGB 9.1 (L) 12/14/2018    HCT 28.0 (L) 12/14/2018    MCV 90.9 12/14/2018     12/14/2018       Assessment:      POD 3 s/p left hip fracture fixation  Vaginal bleeding    Plan:    Await final ultrasound read.  If patient has thickened lining >4mm can consider endometrial sampling methods versus consultation with gynecologic oncology given patient's comorbidities and difficulty with bedside exam.  She was verbally consented for exam and agreed.  Given her cognitive impairment, I also called her son to discuss and he agreed to the pelvic exam, however she was very uncomfortable and asked us to stop during the exam.    Will order coagulation, CBC.  Would continue aspirin prophylaxis for now unless excessive bleeding were to arise given high risk of VTE.  SCDs replaced after exam.  She is not currently having active bleeding, but if concern for excessive bleeding were to arise, could consider Provera 10mg daily or  tranexamic acid in severe cases.    Appreciate consultation, will follow up on ultrasound read tomorrow.    Dispo: continue inpatient  Debi Callejas MD  12/14/2018  7:08 PM

## 2018-12-15 NOTE — PLAN OF CARE
Problem: Patient Care Overview  Goal: Plan of Care Review  Outcome: Ongoing (interventions implemented as appropriate)   12/15/18 1441   OTHER   Outcome Summary Pt alert to self and cooperative. No c/o pain. Drsg changed per ortho. Staples intact. Pt turned. Vaginal bleeding still noted with small clots. GYN has seen and GYN/oncology has been consulted to see pt. Family at bedside and they have been updated. Will continue to monitor patient.     Goal: Individualization and Mutuality  Outcome: Ongoing (interventions implemented as appropriate)    Goal: Discharge Needs Assessment  Outcome: Ongoing (interventions implemented as appropriate)    Goal: Interprofessional Rounds/Family Conf  Outcome: Ongoing (interventions implemented as appropriate)      Problem: Fall Risk (Adult)  Goal: Absence of Fall  Outcome: Ongoing (interventions implemented as appropriate)      Problem: Skin Injury Risk (Adult)  Goal: Skin Health and Integrity  Outcome: Ongoing (interventions implemented as appropriate)      Problem: Fracture Orthopaedic (Adult)  Goal: Signs and Symptoms of Listed Potential Problems Will be Absent, Minimized or Managed (Fracture Orthopaedic)  Outcome: Ongoing (interventions implemented as appropriate)

## 2018-12-16 NOTE — PROGRESS NOTES
"Gyn Oncology Daily Progress Note    Patient Identification:  Name: Sienna Ortiz   Age: 89 y.o.   :  3/4/1929   MRN: [id number]    Chief Complaint:  Postmenopausal bleeding     Subjective:  Patient denies any current pain. SHe is tolerating diet. Per nursing her bleeding has decreased and no further clots.     Objective:    Scheduled Meds:  aspirin 325 mg Oral Daily   atorvastatin 10 mg Oral Daily   docusate sodium 100 mg Oral BID   donepezil 10 mg Oral Nightly   insulin lispro 0-9 Units Subcutaneous 4x Daily With Meals & Nightly   levothyroxine 75 mcg Oral Daily   megestrol 40 mg Oral BID   metoprolol tartrate 25 mg Oral Q12H   pantoprazole 40 mg Oral QAM   sodium chloride 3 mL Intravenous Q12H     Continuous Infusions:   PRN Meds:•  acetaminophen  •  acetaminophen  •  bisacodyl  •  bisacodyl  •  dextrose  •  dextrose  •  glucagon (human recombinant)  •  HYDROcodone-acetaminophen  •  HYDROcodone-acetaminophen  •  magnesium hydroxide  •  melatonin  •  Morphine  •  Morphine **AND** naloxone  •  potassium chloride **OR** potassium chloride **OR** potassium chloride  •  [COMPLETED] Insert peripheral IV **AND** sodium chloride  •  sodium chloride  •  sodium chloride    Vital signs in last 24 hours:  Temp:  [97.7 °F (36.5 °C)-98.5 °F (36.9 °C)] 98 °F (36.7 °C)  Heart Rate:  [] 73  Resp:  [16-18] 16  BP: ()/(48-70) 97/57    Intake/Output:    Intake/Output Summary (Last 24 hours) at 2018 1133  Last data filed at 2018 0842  Gross per 24 hour   Intake 240 ml   Output --   Net 240 ml       Exam:  BP 97/57 (BP Location: Left arm, Patient Position: Lying)   Pulse 73   Temp 98 °F (36.7 °C) (Oral)   Resp 16   Ht 160 cm (63\")   Wt 68 kg (150 lb)   SpO2 97%   BMI 26.57 kg/m²   Physical Examination: General appearance - alert, well appearing, and in no distress  Neck - supple, no significant adenopathy  Chest - clear to auscultation, no wheezes, rales or rhonchi, symmetric air entry  Heart - normal " rate, regular rhythm, normal S1, S2, no murmurs, rubs, clicks or gallops  Abdomen - soft, nontender, nondistended, no masses or organomegaly  Extremities - peripheral pulses normal, no pedal edema, no clubbing or cyanosis,  Thigh incisions doing well.     Data Review:  Recent Results (from the past 24 hour(s))   POC Glucose Once    Collection Time: 12/15/18  4:10 PM   Result Value Ref Range    Glucose 125 70 - 130 mg/dL   Hemoglobin & Hematocrit, Blood    Collection Time: 12/15/18  4:24 PM   Result Value Ref Range    Hemoglobin 7.8 (L) 11.9 - 15.5 g/dL    Hematocrit 24.5 (L) 35.6 - 45.5 %   Potassium    Collection Time: 12/15/18  4:24 PM   Result Value Ref Range    Potassium 4.6 3.5 - 5.2 mmol/L   POC Glucose Once    Collection Time: 12/15/18  8:21 PM   Result Value Ref Range    Glucose 138 (H) 70 - 130 mg/dL   POC Glucose Once    Collection Time: 12/16/18  6:08 AM   Result Value Ref Range    Glucose 112 70 - 130 mg/dL   Basic Metabolic Panel    Collection Time: 12/16/18  7:58 AM   Result Value Ref Range    Glucose 112 (H) 65 - 99 mg/dL    BUN 17 8 - 23 mg/dL    Creatinine 0.67 0.57 - 1.00 mg/dL    Sodium 144 136 - 145 mmol/L    Potassium 4.4 3.5 - 5.2 mmol/L    Chloride 108 (H) 98 - 107 mmol/L    CO2 24.5 22.0 - 29.0 mmol/L    Calcium 8.6 8.6 - 10.5 mg/dL    eGFR  African Amer 100 >60 mL/min/1.73    BUN/Creatinine Ratio 25.4 (H) 7.0 - 25.0    Anion Gap 11.5 mmol/L   Magnesium    Collection Time: 12/16/18  7:58 AM   Result Value Ref Range    Magnesium 2.3 1.6 - 2.4 mg/dL   CBC Auto Differential    Collection Time: 12/16/18  7:58 AM   Result Value Ref Range    WBC 13.03 (H) 4.50 - 10.70 10*3/mm3    RBC 2.53 (L) 3.90 - 5.20 10*6/mm3    Hemoglobin 7.5 (L) 11.9 - 15.5 g/dL    Hematocrit 23.8 (L) 35.6 - 45.5 %    MCV 94.1 80.5 - 98.2 fL    MCH 29.6 26.9 - 32.0 pg    MCHC 31.5 (L) 32.4 - 36.3 g/dL    RDW 13.6 (H) 11.7 - 13.0 %    RDW-SD 45.4 37.0 - 54.0 fl    MPV 10.0 6.0 - 12.0 fL    Platelets 284 140 - 500 10*3/mm3     Neutrophil % 70.7 42.7 - 76.0 %    Lymphocyte % 20.3 19.6 - 45.3 %    Monocyte % 6.3 5.0 - 12.0 %    Eosinophil % 2.5 0.3 - 6.2 %    Basophil % 0.2 0.0 - 1.5 %    Immature Grans % 0.8 (H) 0.0 - 0.5 %    Neutrophils, Absolute 9.23 (H) 1.90 - 8.10 10*3/mm3    Lymphocytes, Absolute 2.64 0.90 - 4.80 10*3/mm3    Monocytes, Absolute 0.82 0.20 - 1.20 10*3/mm3    Eosinophils, Absolute 0.32 0.00 - 0.70 10*3/mm3    Basophils, Absolute 0.02 0.00 - 0.20 10*3/mm3    Immature Grans, Absolute 0.10 (H) 0.00 - 0.03 10*3/mm3    nRBC 0.3 (H) 0.0 - 0.0 /100 WBC   POC Glucose Once    Collection Time: 12/16/18 11:27 AM   Result Value Ref Range    Glucose 118 70 - 130 mg/dL         Assessment:  1. Postmenopausal bleeding- improving on megace  2. Anemia due to blood loss  3. Thickened endometrial stripe  4. Dementia  5. Left femur fracture- s/p surgical repair    Plan:   Per nursing bleeding improving on megace and she is tolerating it well.  Would recommend transfusion of 1-2 units pRBC to correct anemia.  I spoke with her son Abiel on the phone and he is in agreement. Yesterday we had discussed options and he preferred minimal intervention for now but if bleeding were to worsen the next step would be attempted hysteroscopy D&C to try to get a diagnosis and manage bleeding but we did not think she could tolerate a more extended procedure such as a hysterectomy given her other medical co-morbidities.  We will monitor bleeding and hemoglobin. Hopefully,  her bleeding can be managed just with megace.      Olive Martin MD  [phone]  [facility]  12/16/2018  11:33 AM

## 2018-12-16 NOTE — PROGRESS NOTES
Given that patient is now being followed by gynecologic oncology, we will sign off, but please do not hesitate to contact us if we can be of any assistance.       Med refill request: Xanax 0.25 mg tab  1 tab po QHS    Last seen: 11/7/18  Next appointment: 11/1/19    Writer looked pt up in PDMP, past fill dates are as follows:  Prescribed: 6/19/2018  Qty: 90  Days: 90  Refills: 1  Dispensed: 6/19/2018    Pharmacy was contacted and confirmed that med was sent out on 11/14/18 #90. 0 RF remaining. Too soon to refill.    PDMP reviewed?  Yes  [x]    No  []   Requesting/dispensing early refills?  Yes  [x]    No  []   Other controlled substances?   -Guaifenesin-Codeine      Is the patient due for refill of this medication(s): No too soon to refill  PDMP review: Criteria not met because med not due until 2/14/19.     Request denied.

## 2018-12-16 NOTE — PROGRESS NOTES
"DAILY PROGRESS NOTE  [facility]    Patient Identification:  Name: Sienna Ortiz  Age: 89 y.o.  Sex: female  :  3/4/1929  MRN: [id number]         Primary Care Physician: Sean Mayer MD      Subjective  No c/o.     Objective:  General Appearance:  Comfortable, well-appearing, in no acute distress and not in pain.    Vital signs: (most recent): Blood pressure 97/57, pulse 73, temperature 98 °F (36.7 °C), temperature source Oral, resp. rate 16, height 160 cm (63\"), weight 68 kg (150 lb), SpO2 97 %, not currently breastfeeding.    Lungs:  Normal effort and normal respiratory rate.  Breath sounds clear to auscultation.    Heart: Regular rhythm.    Extremities: There is no dependent edema.    Neurological: Patient is alert.  (Oriented only to person. ).    Skin:  Warm and dry.                Vital signs in last 24 hours:  Temp:  [97.7 °F (36.5 °C)-98.5 °F (36.9 °C)] 98 °F (36.7 °C)  Heart Rate:  [] 73  Resp:  [16-18] 16  BP: ()/(48-70) 97/57    Intake/Output:  No intake or output data in the 24 hours ending 18 1112      Results from last 7 days   Lab Units  18   0758  12/15/18   1624  12/15/18   0542  18   0617  18   0628  18   0628  18   0630   WBC 10*3/mm3  13.03*   --   12.48*  12.48*  9.46  10.16  11.97*  9.56   HEMOGLOBIN g/dL  7.5*  7.8*  8.1*  8.6*  9.1*  8.9*  10.0*  13.4   PLATELETS 10*3/mm3  284   --   244  263  212  185  200  217     Results from last 7 days   Lab Units  18   0758  12/15/18   1624  12/15/18   0542  18   0617  18   0628  18   0628  18   0630  12/10/18   0632   SODIUM mmol/L  144   --   141  141  141  141  142  141   POTASSIUM mmol/L  4.4  4.6  3.3*  3.5  3.6  3.5  3.1*  3.5   CHLORIDE mmol/L  108*   --   103  104  104  105  105  106   CO2 mmol/L  24.5   --   24.9  25.8  26.4  21.7*  22.0  21.8*   BUN mg/dL  17   --   15  14  23  14  6*  9   CREATININE mg/dL  0.67   --   0.62  " 0.66  0.67  0.87  0.59  0.71   GLUCOSE mg/dL  112*   --   148*  132*  166*  253*  187*  182*   Estimated Creatinine Clearance: 44.1 mL/min (by C-G formula based on SCr of 0.67 mg/dL).  Results from last 7 days   Lab Units  12/16/18   0758  12/15/18   0542  12/14/18   0617  12/13/18   0628  12/12/18   0628  12/11/18   0630  12/10/18   0632  12/09/18   1931   CALCIUM mg/dL  8.6  8.3*  8.5*  8.6  8.3*  8.9  9.0  9.5   ALBUMIN g/dL   --    --    --    --    --    --   3.40*  4.00   MAGNESIUM mg/dL  2.3   --    --    --   2.0   --    --    --      Results from last 7 days   Lab Units  12/10/18   0632  12/09/18   1931   ALBUMIN g/dL  3.40*  4.00   BILIRUBIN mg/dL  0.9  0.7   ALK PHOS U/L  78  98   AST (SGOT) U/L  14  17   ALT (SGPT) U/L  6  7       Assessment:    Closed 2-part intertrochanteric fracture of left femur (CMS/HCC)    Dementia    Type II diabetes mellitus (CMS/HCC)    Benign essential hypertension    Hypothyroidism    Slow transit constipation    Fall    DUB (dysfunctional uterine bleeding)      Plan:  Still w vaginal bleeding although reported to slow down.  Hbg continues to drop.  Will check iron levels, retic, INR...and transfuse 1 U PRBCs.   Gyn input greatly appreciated.     Cresencio Qureshi MD  12/16/2018  11:12 AM

## 2018-12-16 NOTE — PLAN OF CARE
Problem: Patient Care Overview  Goal: Plan of Care Review  Outcome: Ongoing (interventions implemented as appropriate)   12/16/18 2893   OTHER   Outcome Summary Pt alert to self. Needs 1 unit PRBC transfused. Currently refusing IV site. Still waiting on type and screen to post. MD aware of pt refusal of IV. Consent is signed and will continue to attempt IV site. VSS. Dressing intact. ASA stopped. Will continue to monitor.     Goal: Individualization and Mutuality  Outcome: Ongoing (interventions implemented as appropriate)    Goal: Discharge Needs Assessment  Outcome: Ongoing (interventions implemented as appropriate)    Goal: Interprofessional Rounds/Family Conf  Outcome: Ongoing (interventions implemented as appropriate)      Problem: Fall Risk (Adult)  Goal: Absence of Fall  Outcome: Ongoing (interventions implemented as appropriate)      Problem: Skin Injury Risk (Adult)  Goal: Skin Health and Integrity  Outcome: Ongoing (interventions implemented as appropriate)      Problem: Fracture Orthopaedic (Adult)  Goal: Signs and Symptoms of Listed Potential Problems Will be Absent, Minimized or Managed (Fracture Orthopaedic)  Outcome: Ongoing (interventions implemented as appropriate)

## 2018-12-16 NOTE — NURSING NOTE
Spoke with Dr. Qureshi and explained that pt is refusing IV.  He asked tat I wait to see if family comes to visit and try again.  Will continue attempts with pt. FRANCA Lowe RN

## 2018-12-16 NOTE — PLAN OF CARE
Problem: Patient Care Overview  Goal: Plan of Care Review  Outcome: Ongoing (interventions implemented as appropriate)   12/16/18 0431   Coping/Psychosocial   Plan of Care Reviewed With patient   Plan of Care Review   Progress improving   OTHER   Outcome Summary No complaints voiced, nat pad changed 3x's this shift, moderate drainage, no clots noted, eyes closed at long interval, resting quielty     Goal: Individualization and Mutuality  Outcome: Ongoing (interventions implemented as appropriate)    Goal: Discharge Needs Assessment  Outcome: Ongoing (interventions implemented as appropriate)    Goal: Interprofessional Rounds/Family Conf  Outcome: Ongoing (interventions implemented as appropriate)      Problem: Fall Risk (Adult)  Goal: Absence of Fall  Outcome: Ongoing (interventions implemented as appropriate)      Problem: Skin Injury Risk (Adult)  Goal: Skin Health and Integrity  Outcome: Ongoing (interventions implemented as appropriate)      Problem: Fracture Orthopaedic (Adult)  Goal: Signs and Symptoms of Listed Potential Problems Will be Absent, Minimized or Managed (Fracture Orthopaedic)  Outcome: Ongoing (interventions implemented as appropriate)

## 2018-12-16 NOTE — NURSING NOTE
Spoke with pt's son Abiel Ortiz over the phone to receive consent for blood.  Verified with second RN.  Consent in chart, awaiting type and screen.  FRANCA Lowe RN

## 2018-12-16 NOTE — NURSING NOTE
Still waiting for lab to draw type and screen.  Pt is refusing new IV site and she is an extreme hard stick.  Call to MD to discuss.  FRANCA Lowe RN

## 2018-12-16 NOTE — NURSING NOTE
Blood is now ready.  2 attempts at starting an IV with no success.  Pt is still uncooperative with getting new IV site.  MD notified.  FRANCA Lowe RN

## 2018-12-17 NOTE — THERAPY TREATMENT NOTE
Acute Care - Physical Therapy Treatment Note  [facility]     Patient Name: Sienna Ortiz  : 3/4/1929  MRN: [id number]  Today's Date: 2018  Onset of Illness/Injury or Date of Surgery: 18          Admit Date: 2018    Visit Dx:    ICD-10-CM ICD-9-CM   1. Fall, initial encounter W19.XXXA E888.9   2. Closed fracture of left hip, initial encounter (CMS/[facility]) S72.002A 820.8   3. Essential hypertension I10 401.9   4. 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[facility])   • Dementia   • Type II diabetes mellitus (CMS/[facility])   • Hyperlipidemia   • Benign essential hypertension   • Hypothyroidism   • Vitamin D deficiency   • Closed fracture of right hip with routine healing   • Slow transit constipation   • Fall   • Closed 2-part intertrochanteric fracture of left femur (CMS/[facility])   • DUB (dysfunctional uterine bleeding)       Therapy Treatment    Rehabilitation Treatment Summary     Row Name 18 1400             Treatment Time/Intention    Discipline  physical therapy assistant  -CW      Document Type  therapy note (daily note)  -CW      Subjective Information  complains of;pain  -CW      Mode of Treatment  physical therapy  -CW      Therapy Frequency (PT Clinical Impression)  daily  -CW      Patient Effort  adequate  -CW      Existing Precautions/Restrictions  fall  -CW      Recorded by [CW] Goran Berger P, PTA 18 1403      Row Name 18 1400             Vital Signs    O2 Delivery Pre Treatment  room air  -CW      Recorded by [CW] Goran Berger P, PTA 18 1403      Row Name 18 1400             Cognitive Assessment/Intervention- PT/OT    Orientation Status (Cognition)  oriented to;person;place  -CW      Follows Commands (Cognition)  follows one step commands;75-90% accuracy;delayed response/completion  -CW      Personal Safety Interventions  fall prevention program maintained;gait belt;muscle  strengthening facilitated;nonskid shoes/slippers when out of bed  -CW      Recorded by [CW] Goran Berger, PTA 12/17/18 1403      Row Name 12/17/18 1400             Bed Mobility Assessment/Treatment    Bed Mobility Assessment/Treatment  supine-sit;sit-supine  -CW      Supine-Sit [place] (Bed Mobility)  moderate assist (50% patient effort);2 person assist  -CW      Sit-Supine [place] (Bed Mobility)  moderate assist (50% patient effort);2 person assist  -CW      Assistive Device (Bed Mobility)  draw sheet;head of bed elevated  -CW      Recorded by [CW] Goran Berger, PTA 12/17/18 1403      Row Name 12/17/18 1400             Transfer Assessment/Treatment    Transfer Assessment/Treatment  sit-stand transfer;stand-sit transfer  -CW      Recorded by [CW] Goran Berger, PTA 12/17/18 1403      Row Name 12/17/18 1400             Sit-Stand Transfer    Sit-Stand [place] (Transfers)  moderate assist (50% patient effort);verbal cues  -CW      Assistive Device (Sit-Stand Transfers)  walker, front-wheeled  -CW      Recorded by [CW] Goran Berger, PTA 12/17/18 1403      Row Name 12/17/18 1400             Stand-Sit Transfer    Stand-Sit [place] (Transfers)  moderate assist (50% patient effort)  -CW      Assistive Device (Stand-Sit Transfers)  walker, front-wheeled  -CW      Recorded by [CW] Goran Berger, PTA 12/17/18 1403      Row Name 12/17/18 1400             Positioning and Restraints    Pre-Treatment Position  in bed  -CW      Post Treatment Position  bed  -CW      In Bed  notified nsg;supine;call light within reach;encouraged to call for assist;exit alarm on  -CW      Recorded by [CW] Goran Berger, PTA 12/17/18 1403      Row Name 12/17/18 1400             Pain Assessment    Additional Documentation  Pain Scale: Numbers Pre/Post-Treatment (Group)  -CW      Recorded by [CW] Goran Berger, PTA 12/17/18 1403      Row Name 12/17/18 1400             Pain Scale: Numbers  Pre/Post-Treatment    Pain Scale: Numbers, Pretreatment  5/10  -CW      Pain Scale: Numbers, Post-Treatment  6/10  -CW      Pain Location - Side  Left  -CW      Pain Location  hip  -CW      Pain Intervention(s)  Repositioned;Ambulation/increased activity  -CW      Recorded by [CW] Goran Berger, PTA 12/17/18 1403      Row Name                Wound 12/11/18 1600 Left hip incision    Wound - Properties Group Date first assessed: 12/11/18 [MB] Time first assessed: 1600 [MB] Side: Left [MB] Location: hip [MB] Type: incision [MB] Recorded by:  [MB] Mariia Diane RN 12/11/18 1600    Row Name 12/17/18 1400             Outcome Summary/Treatment Plan (PT)    Anticipated Discharge Disposition (PT)  skilled nursing facility  -CW      Recorded by [CW] Goran Berger, PTA 12/17/18 1403        User Key  (r) = Recorded By, (t) = Taken By, (c) = Cosigned By    Initials Name Effective Dates Discipline    MB Mariia Diane RN 06/16/16 -  Nurse    CW Goran Berger, PTA 03/07/18 -  PT          Wound 12/11/18 1600 Left hip incision (Active)   Dressing Appearance dry;intact 12/17/2018  2:00 PM   Closure SHANTANU 12/17/2018  2:00 PM   Base dressing in place, unable to visualize 12/17/2018  2:00 PM       Rehab Goal Summary     Row Name 12/17/18 0800             Physical Therapy Goals    Bed Mobility Goal Selection (PT)  bed mobility, PT goal 1  -PC      Transfer Goal Selection (PT)  transfer, PT goal 1  -PC      Gait Training Goal Selection (PT)  gait training, PT goal 1  -PC         Bed Mobility Goal 1 (PT)    Time Frame (Bed Mobility Goal 1, PT)  1 week  -PC      Progress/Outcomes (Bed Mobility Goal 1, PT)  goal ongoing  -PC         Transfer Goal 1 (PT)    Time Frame (Transfer Goal 1, PT)  1 week  -PC      Progress/Outcome (Transfer Goal 1, PT)  goal ongoing  -PC         Gait Training Goal 1 (PT)    Time Frame (Gait Training Goal 1, PT)  1 week  -PC      Progress/Outcome (Gait Training Goal 1, PT)  goal ongoing  -PC         User Key  (r) = Recorded By, (t) = Taken By, (c) = Cosigned By    Initials Name Provider Type Discipline    PC Ryanne Vilchis, PT Physical Therapist PT          Physical Therapy Education     Title: PT OT SLP Therapies (In Progress)     Topic: Physical Therapy (In Progress)     Point: Mobility training (In Progress)     Learning Progress Summary           Patient Acceptance, TB,D, NR by  at 12/17/2018  2:04 PM    Acceptance, E, NR by  at 12/15/2018  3:00 PM    Acceptance, E, NR by EM at 12/14/2018  2:25 PM    Nonacceptance, E, NR by EM at 12/13/2018  3:36 PM    Acceptance, E, NR by EM at 12/12/2018 11:42 AM                   Point: Home exercise program (In Progress)     Learning Progress Summary           Patient Acceptance, TB,D, NR by  at 12/17/2018  2:04 PM    Acceptance, E, NR by  at 12/15/2018  3:00 PM    Acceptance, E, NR by EM at 12/14/2018  2:25 PM    Acceptance, E, NR by EM at 12/12/2018 11:42 AM                   Point: Body mechanics (In Progress)     Learning Progress Summary           Patient Acceptance, TB,D, NR by  at 12/17/2018  2:04 PM    Acceptance, E, NR by  at 12/15/2018  3:00 PM                   Point: Precautions (In Progress)     Learning Progress Summary           Patient Acceptance, TB,D, NR by  at 12/17/2018  2:04 PM    Acceptance, E, NR by  at 12/15/2018  3:00 PM                               User Key     Initials Effective Dates Name Provider Type Discipline    EM 04/03/18 -  Danya Monae, PT Physical Therapist PT     03/07/18 -  Goran Berger, PTA Physical Therapy Assistant PT     08/19/18 -  Therese Florence PTA Physical Therapy Assistant PT                PT Recommendation and Plan  Anticipated Discharge Disposition (PT): skilled nursing facility  Therapy Frequency (PT Clinical Impression): daily  Outcome Summary/Treatment Plan (PT)  Anticipated Discharge Disposition (PT): skilled nursing facility  Plan of Care Reviewed With: patient  Progress:  improving  Outcome Summary: Pt limited activity due to pain and refusing to amb  Outcome Measures     Row Name 12/17/18 1400 12/15/18 1500          How much help from another person do you currently need...    Turning from your back to your side while in flat bed without using bedrails?  2  -CW  2  -EH     Moving from lying on back to sitting on the side of a flat bed without bedrails?  2  -CW  2  -EH     Moving to and from a bed to a chair (including a wheelchair)?  2  -CW  2  -EH     Standing up from a chair using your arms (e.g., wheelchair, bedside chair)?  2  -CW  2  -EH     Climbing 3-5 steps with a railing?  1  -CW  1  -EH     To walk in hospital room?  1  -CW  1  -EH     AM-PAC 6 Clicks Score  10  -CW  10  -EH        Functional Assessment    Outcome Measure Options  AM-PAC 6 Clicks Basic Mobility (PT)  -CW  --       User Key  (r) = Recorded By, (t) = Taken By, (c) = Cosigned By    Initials Name Provider Type    CW Goran Berger PTA Physical Therapy Assistant     Therese Florence PTA Physical Therapy Assistant         Time Calculation:   PT Charges     Row Name 12/17/18 1406 12/17/18 0825          Time Calculation    Start Time  1350  -CW  --     Stop Time  1406  -CW  --     Time Calculation (min)  16 min  -CW  --     PT Received On  12/17/18  -CW  --     PT - Next Appointment  12/18/18  -CW  --     PT Goal Re-Cert Due Date  --  12/24/18  -PC       User Key  (r) = Recorded By, (t) = Taken By, (c) = Cosigned By    Initials Name Provider Type    Ryanne Nunez, PT Physical Therapist    CW Goran Berger, GERONIMO Physical Therapy Assistant        Therapy Suggested Charges     Code   Minutes Charges    None           Therapy Charges for Today     Code Description Service Date Service Provider Modifiers Qty    [id number] HC PT THER PROC EA 15 MIN 12/17/2018 Goran Berger PTA GP 1          PT G-Codes  Outcome Measure Options: AM-PAC 6 Clicks Basic Mobility (PT)  AM-PAC 6 Clicks Score:  10    Goran Berger, PTA  12/17/2018

## 2018-12-17 NOTE — PROGRESS NOTES
Orthopedic Progress Note      Patient: Sienna Ortiz    YOB: 1929    Medical Record Number: [id number]    Attending Physician: Cresencio Qureshi MD    Date of Admission: 12/9/2018  4:33 PM    Admitting Dx:  Closed fracture of left hip, initial encounter (CMS/[facility]) [S72.002A]  Fall, initial encounter [W19.XXXA]    Status Post:  Left intertrochanteric hip fracture fixation using Synthes TFNA measuring 380 mm x 10 mm with 85 mm compression screw and distal locking screw     Post Operative Day Number: 6    Current Problem List:   Patient Active Problem List   Diagnosis   • Anemia, pernicious   • Cerebellar ataxia (CMS/[facility])   • Dementia   • Type II diabetes mellitus (CMS/[facility])   • Hyperlipidemia   • Benign essential hypertension   • Hypothyroidism   • Vitamin D deficiency   • Closed fracture of right hip with routine healing   • Slow transit constipation   • Fall   • Closed 2-part intertrochanteric fracture of left femur (CMS/[facility])   • DUB (dysfunctional uterine bleeding)         Past Medical History:   Diagnosis Date   • Anemia, pernicious 10/12/2014   • Benign essential hypertension 10/12/2014   • 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   • Dementia 02/19/2014   • Hyperlipidemia    • Hypothyroidism    • Type II diabetes mellitus (CMS/[facility])    • Vitamin D deficiency 07/26/2011    unspecified       SUBJECTIVE: 89 y.o.  female. Awake and alert.     OBJECTIVE:   Vitals:    12/16/18 2356 12/17/18 0358 12/17/18 0425 12/17/18 0736   BP: 161/69 122/89 101/56 143/94   BP Location: Right arm Left arm  Left arm   Patient Position: Lying Lying  Lying   Pulse: 90 86 87 92   Resp: 20 18 18 18   Temp: 98.3 °F (36.8 °C) 98.2 °F (36.8 °C) 98.2 °F (36.8 °C) 98.4 °F (36.9 °C)   TempSrc: Oral Oral Oral Oral   SpO2: 98% 99% 98% 91%   Weight:       Height:         I/O last 3 completed shifts:  In: 960 [P.O.:960]  Out: -     Current Medications:  Scheduled Meds:  atorvastatin 10 mg Oral Daily   docusate sodium 100 mg Oral  BID   donepezil 10 mg Oral Nightly   insulin lispro 0-9 Units Subcutaneous 4x Daily With Meals & Nightly   levothyroxine 75 mcg Oral Daily   megestrol 40 mg Oral BID   metoprolol tartrate 25 mg Oral Q12H   pantoprazole 40 mg Oral QAM   sodium chloride 10 mL Intravenous Q12H   sodium chloride 3 mL Intravenous Q12H     PRN Meds:.•  acetaminophen  •  acetaminophen  •  bisacodyl  •  bisacodyl  •  dextrose  •  dextrose  •  glucagon (human recombinant)  •  HYDROcodone-acetaminophen  •  HYDROcodone-acetaminophen  •  magnesium hydroxide  •  melatonin  •  Morphine  •  Morphine **AND** naloxone  •  potassium chloride **OR** potassium chloride **OR** potassium chloride  •  [COMPLETED] Insert peripheral IV **AND** sodium chloride  •  sodium chloride  •  sodium chloride  •  sodium chloride  •  sodium chloride    Diagnostic Tests:   Lab Results (last 24 hours)     Procedure Component Value Units Date/Time    Basic Metabolic Panel [[id number]]  (Abnormal) Collected:  12/17/18 0819    Specimen:  Blood Updated:  12/17/18 0904     Glucose 120 mg/dL      BUN 11 mg/dL      Creatinine 0.62 mg/dL      Sodium 140 mmol/L      Potassium 3.9 mmol/L      Chloride 104 mmol/L      CO2 24.1 mmol/L      Calcium 8.9 mg/dL      eGFR  African Amer 110 mL/min/1.73      BUN/Creatinine Ratio 17.7     Anion Gap 11.9 mmol/L     Narrative:       The MDRD GFR formula is only valid for adults with stable renal function between ages 18 and 70.    CBC & Differential [[id number]] Collected:  12/17/18 0819    Specimen:  Blood Updated:  12/17/18 0846    Narrative:       The following orders were created for panel order CBC & Differential.  Procedure                               Abnormality         Status                     ---------                               -----------         ------                     CBC Auto Differential[[id number]]        Abnormal            Final result                 Please view results for these tests on the individual orders.     CBC Auto Differential [[id number]]  (Abnormal) Collected:  12/17/18 0819    Specimen:  Blood Updated:  12/17/18 0846     WBC 11.70 10*3/mm3      RBC 3.53 10*6/mm3      Hemoglobin 10.4 g/dL      Hematocrit 32.2 %      MCV 91.2 fL      MCH 29.5 pg      MCHC 32.3 g/dL      RDW 13.8 %      RDW-SD 44.2 fl      MPV 10.1 fL      Platelets 323 10*3/mm3      Neutrophil % 66.5 %      Lymphocyte % 24.6 %      Monocyte % 6.6 %      Eosinophil % 2.2 %      Basophil % 0.1 %      Immature Grans % 1.1 %      Neutrophils, Absolute 7.78 10*3/mm3      Lymphocytes, Absolute 2.88 10*3/mm3      Monocytes, Absolute 0.77 10*3/mm3      Eosinophils, Absolute 0.26 10*3/mm3      Basophils, Absolute 0.01 10*3/mm3      Immature Grans, Absolute 0.13 10*3/mm3      nRBC 0.0 /100 WBC     POC Glucose Once [[id number]]  (Normal) Collected:  12/17/18 0642    Specimen:  Blood Updated:  12/17/18 0644     Glucose 124 mg/dL     POC Glucose Once [[id number]]  (Abnormal) Collected:  12/16/18 2116    Specimen:  Blood Updated:  12/16/18 2118     Glucose 133 mg/dL     Vitamin B12 [[id number]]  (Normal) Collected:  12/16/18 1535    Specimen:  Blood Updated:  12/16/18 1633     Vitamin B-12 327 pg/mL     Folate [[id number]]  (Abnormal) Collected:  12/16/18 1535    Specimen:  Blood Updated:  12/16/18 1633     Folate 4.77 ng/mL     POC Glucose Once [[id number]]  (Normal) Collected:  12/16/18 1629    Specimen:  Blood Updated:  12/16/18 1632     Glucose 91 mg/dL     Iron Profile [[id number]]  (Abnormal) Collected:  12/16/18 1535    Specimen:  Blood Updated:  12/16/18 1617     Iron 36 mcg/dL      Iron Saturation 16 %      Transferrin 148 mg/dL      TIBC 221 mcg/dL     Protime-INR [[id number]]  (Abnormal) Collected:  12/16/18 1535    Specimen:  Blood Updated:  12/16/18 1616     Protime 16.2 Seconds      INR 1.33    Reticulocytes [[id number]]  (Abnormal) Collected:  12/16/18 1535    Specimen:  Blood Updated:  12/16/18 1603     Reticulocyte % 7.23 %     POC Glucose Once  [[id number]]  (Normal) Collected:  12/16/18 1127    Specimen:  Blood Updated:  12/16/18 1128     Glucose 118 mg/dL           PHYSICAL EXAM:   Left hip and thigh incisions intact.  Dressings dry.  Calf soft and nontender.  Good motion and sensation to left foot and ankle.  Slow progress with PT.  Discharge held due to vaginal bleeding.  Bleeding less today.      ASSESSMENT & PLAN: Status post left hip intertrochanteric fracture fixation with intramedullary nail.    She may continue to weight-bear as tolerated with walker and assistance on the left hip.  She is on aspirin for DVT prophylaxis.  Okay for discharge from orthopedic standpoint    May transfer to SNF later today.  RTO in 2 weeks     Jorge Capone MD        Date: 12/17/2018    Arlyn Graham RN

## 2018-12-17 NOTE — PROGRESS NOTES
"Gyn Oncology Daily Progress Note    Patient Identification:  Name: Sienna Ortiz   Age: 89 y.o.   :  3/4/1929   MRN: [id number]    Chief Complaint:  Postmenopausal bleeding     Subjective:  Patient denies any current pain. SHe is tolerating diet. Per nursing her bleeding has decreased and no further clots. Bleeding is now intermittent and mild. Blood transfusion is now running since difficulty getting IV access yesterday.    Objective:    Scheduled Meds:    atorvastatin 10 mg Oral Daily   docusate sodium 100 mg Oral BID   donepezil 10 mg Oral Nightly   insulin lispro 0-9 Units Subcutaneous 4x Daily With Meals & Nightly   levothyroxine 75 mcg Oral Daily   megestrol 40 mg Oral BID   metoprolol tartrate 25 mg Oral Q12H   pantoprazole 40 mg Oral QAM   sodium chloride 10 mL Intravenous Q12H   sodium chloride 3 mL Intravenous Q12H     Continuous Infusions:   PRN Meds:•  acetaminophen  •  acetaminophen  •  bisacodyl  •  bisacodyl  •  dextrose  •  dextrose  •  glucagon (human recombinant)  •  HYDROcodone-acetaminophen  •  HYDROcodone-acetaminophen  •  magnesium hydroxide  •  melatonin  •  Morphine  •  Morphine **AND** naloxone  •  potassium chloride **OR** potassium chloride **OR** potassium chloride  •  [COMPLETED] Insert peripheral IV **AND** sodium chloride  •  sodium chloride  •  sodium chloride  •  sodium chloride  •  sodium chloride    Vital signs in last 24 hours:  Temp:  [98 °F (36.7 °C)-98.4 °F (36.9 °C)] 98.2 °F (36.8 °C)  Heart Rate:  [64-98] 87  Resp:  [16-20] 18  BP: ()/(56-92) 101/56    Intake/Output:    Intake/Output Summary (Last 24 hours) at 2018 0633  Last data filed at 2018 1813  Gross per 24 hour   Intake 960 ml   Output --   Net 960 ml       Exam:  /56   Pulse 87   Temp 98.2 °F (36.8 °C) (Oral)   Resp 18   Ht 160 cm (63\")   Wt 68 kg (150 lb)   SpO2 98%   BMI 26.57 kg/m²   Physical Examination: General appearance - alert, well appearing, and in no distress  Neck - " supple, no significant adenopathy  Chest - clear to auscultation, no wheezes, rales or rhonchi, symmetric air entry  Heart - normal rate, regular rhythm, normal S1, S2, no murmurs, rubs, clicks or gallops  Abdomen - soft, nontender, nondistended, no masses or organomegaly  Extremities - peripheral pulses normal, no pedal edema, no clubbing or cyanosis,  Thigh incisions doing well.     Data Review:  Recent Results (from the past 24 hour(s))   Basic Metabolic Panel    Collection Time: 12/16/18  7:58 AM   Result Value Ref Range    Glucose 112 (H) 65 - 99 mg/dL    BUN 17 8 - 23 mg/dL    Creatinine 0.67 0.57 - 1.00 mg/dL    Sodium 144 136 - 145 mmol/L    Potassium 4.4 3.5 - 5.2 mmol/L    Chloride 108 (H) 98 - 107 mmol/L    CO2 24.5 22.0 - 29.0 mmol/L    Calcium 8.6 8.6 - 10.5 mg/dL    eGFR  African Amer 100 >60 mL/min/1.73    BUN/Creatinine Ratio 25.4 (H) 7.0 - 25.0    Anion Gap 11.5 mmol/L   Magnesium    Collection Time: 12/16/18  7:58 AM   Result Value Ref Range    Magnesium 2.3 1.6 - 2.4 mg/dL   CBC Auto Differential    Collection Time: 12/16/18  7:58 AM   Result Value Ref Range    WBC 13.03 (H) 4.50 - 10.70 10*3/mm3    RBC 2.53 (L) 3.90 - 5.20 10*6/mm3    Hemoglobin 7.5 (L) 11.9 - 15.5 g/dL    Hematocrit 23.8 (L) 35.6 - 45.5 %    MCV 94.1 80.5 - 98.2 fL    MCH 29.6 26.9 - 32.0 pg    MCHC 31.5 (L) 32.4 - 36.3 g/dL    RDW 13.6 (H) 11.7 - 13.0 %    RDW-SD 45.4 37.0 - 54.0 fl    MPV 10.0 6.0 - 12.0 fL    Platelets 284 140 - 500 10*3/mm3    Neutrophil % 70.7 42.7 - 76.0 %    Lymphocyte % 20.3 19.6 - 45.3 %    Monocyte % 6.3 5.0 - 12.0 %    Eosinophil % 2.5 0.3 - 6.2 %    Basophil % 0.2 0.0 - 1.5 %    Immature Grans % 0.8 (H) 0.0 - 0.5 %    Neutrophils, Absolute 9.23 (H) 1.90 - 8.10 10*3/mm3    Lymphocytes, Absolute 2.64 0.90 - 4.80 10*3/mm3    Monocytes, Absolute 0.82 0.20 - 1.20 10*3/mm3    Eosinophils, Absolute 0.32 0.00 - 0.70 10*3/mm3    Basophils, Absolute 0.02 0.00 - 0.20 10*3/mm3    Immature Grans, Absolute 0.10  (H) 0.00 - 0.03 10*3/mm3    nRBC 0.3 (H) 0.0 - 0.0 /100 WBC   POC Glucose Once    Collection Time: 12/16/18 11:27 AM   Result Value Ref Range    Glucose 118 70 - 130 mg/dL   Iron Profile    Collection Time: 12/16/18  3:35 PM   Result Value Ref Range    Iron 36 (L) 37 - 145 mcg/dL    Iron Saturation 16 (L) 20 - 50 %    Transferrin 148 (L) 200 - 360 mg/dL    TIBC 221 mcg/dL   Vitamin B12    Collection Time: 12/16/18  3:35 PM   Result Value Ref Range    Vitamin B-12 327 211 - 946 pg/mL   Folate    Collection Time: 12/16/18  3:35 PM   Result Value Ref Range    Folate 4.77 (L) 4.78 - 24.20 ng/mL   Reticulocytes    Collection Time: 12/16/18  3:35 PM   Result Value Ref Range    Reticulocyte % 7.23 (H) 0.50 - 1.50 %   Protime-INR    Collection Time: 12/16/18  3:35 PM   Result Value Ref Range    Protime 16.2 (H) 11.7 - 14.2 Seconds    INR 1.33 (H) 0.90 - 1.10   Type & Screen    Collection Time: 12/16/18  3:36 PM   Result Value Ref Range    ABO Type O     RH type Positive     Antibody Screen Negative     T&S Expiration Date 12/19/2018 11:59:59 PM    POC Glucose Once    Collection Time: 12/16/18  4:29 PM   Result Value Ref Range    Glucose 91 70 - 130 mg/dL   POC Glucose Once    Collection Time: 12/16/18  9:16 PM   Result Value Ref Range    Glucose 133 (H) 70 - 130 mg/dL   Prepare RBC, 1 Units    Collection Time: 12/17/18  4:08 AM   Result Value Ref Range    Product Code F1261C15     Unit Number [id number]     UNIT  ABO O     UNIT  RH POS     Dispense Status IS     Blood Type [facility]     Blood Expiration Date [id number]     Blood Type Barcode 5100          Assessment:  1. Postmenopausal bleeding- improving on megace  2. Anemia due to blood loss  3. Thickened endometrial stripe  4. Dementia  5. Left femur fracture- s/p surgical repair    Plan:   Per nursing bleeding improving on megace and she is tolerating it well.  Would recommend transfusion of 1-2 units pRBC to correct anemia.  I spoke with her son Abiel on the phone  yesterday and he is in agreement.  We had discussed options and he preferred minimal intervention for now but if bleeding were to worsen the next step would be attempted hysteroscopy D&C to try to get a diagnosis and manage bleeding but we did not think she could tolerate a more extended procedure such as a hysterectomy given her other medical co-morbidities.   Hopefully,  her bleeding can be managed just with megace. If hemoglobin comes up appropriately with transfusion since bleeding has improved, I am OK for transfer to [facility]/skilled care when approved by the primary team. I will tenatively set up a follow up with me in the office in a few weeks to reassess. I will sign off. Call if questions.      Olive Martin MD  [phone]  [facility]  12/17/2018  6:33 AM

## 2018-12-17 NOTE — PLAN OF CARE
Problem: Patient Care Overview  Goal: Plan of Care Review  Outcome: Ongoing (interventions implemented as appropriate)   12/17/18 1630   Coping/Psychosocial   Plan of Care Reviewed With patient   Plan of Care Review   Progress improving   OTHER   Outcome Summary Patient has been pleasant and cooperative during shift. No complaints of pain, nausea, or SOA. Patient worked with PT today. HGB stable this AM after 1 unit of PRBCs. Waiting on pre-cert for patient for D/C. Patient turned Q2. Will continue to monitor and assist patient as needed.       Problem: Fall Risk (Adult)  Goal: Absence of Fall  Outcome: Ongoing (interventions implemented as appropriate)      Problem: Skin Injury Risk (Adult)  Goal: Skin Health and Integrity  Outcome: Ongoing (interventions implemented as appropriate)      Problem: Fracture Orthopaedic (Adult)  Goal: Signs and Symptoms of Listed Potential Problems Will be Absent, Minimized or Managed (Fracture Orthopaedic)  Outcome: Ongoing (interventions implemented as appropriate)

## 2018-12-17 NOTE — PROGRESS NOTES
"DAILY PROGRESS NOTE  [facility]    Patient Identification:  Name: Sienna Ortiz  Age: 89 y.o.  Sex: female  :  3/4/1929  MRN: [id number]         Primary Care Physician: Sean Mayer MD      Subjective  No c/o.     Objective:  General Appearance:  Comfortable, well-appearing, in no acute distress and not in pain (Thin, frail).    Vital signs: (most recent): Blood pressure 143/94, pulse 92, temperature 98.4 °F (36.9 °C), temperature source Oral, resp. rate 18, height 160 cm (63\"), weight 68 kg (150 lb), SpO2 91 %, not currently breastfeeding.    Lungs:  Normal effort and normal respiratory rate.  Breath sounds clear to auscultation.    Heart: Normal rate.  Regular rhythm.    Extremities: There is no dependent edema.    Neurological: Patient is alert.  (Oriented to person only. ).    Skin:  Warm and dry.                Vital signs in last 24 hours:  Temp:  [98 °F (36.7 °C)-98.4 °F (36.9 °C)] 98.4 °F (36.9 °C)  Heart Rate:  [68-98] 92  Resp:  [16-20] 18  BP: (101-161)/(56-94) 143/94    Intake/Output:    Intake/Output Summary (Last 24 hours) at 2018 1319  Last data filed at 2018 0833  Gross per 24 hour   Intake 1281.33 ml   Output --   Net 1281.33 ml         Results from last 7 days   Lab Units  18   0819  12/16/18   0758  12/15/18   1624  12/15/18   0542  18   1953  18   0617  18   0628  18   0628   WBC 10*3/mm3  11.70*  13.03*   --   12.48*  12.48*  9.46  10.16  11.97*   HEMOGLOBIN g/dL  10.4*  7.5*  7.8*  8.1*  8.6*  9.1*  8.9*  10.0*   PLATELETS 10*3/mm3  323  284   --   244  263  212  185  200     Results from last 7 days   Lab Units  18   0819  188  12/15/18   1624  12/15/18   0542  18   0617  18   0628  18   0628  18   0630   SODIUM mmol/L  140  144   --   141  141  141  141  142   POTASSIUM mmol/L  3.9  4.4  4.6  3.3*  3.5  3.6  3.5  3.1*   CHLORIDE mmol/L  104  108*   --   103  104  104  105  105   CO2 " mmol/L  24.1  24.5   --   24.9  25.8  26.4  21.7*  22.0   BUN mg/dL  11  17   --   15  14  23  14  6*   CREATININE mg/dL  0.62  0.67   --   0.62  0.66  0.67  0.87  0.59   GLUCOSE mg/dL  120*  112*   --   148*  132*  166*  253*  187*   Estimated Creatinine Clearance: 44.1 mL/min (by C-G formula based on SCr of 0.62 mg/dL).  Results from last 7 days   Lab Units  12/17/18   0819  12/16/18   0758  12/15/18   0542  12/14/18   0617  12/13/18   0628  12/12/18   0628  12/11/18   0630   CALCIUM mg/dL  8.9  8.6  8.3*  8.5*  8.6  8.3*  8.9   MAGNESIUM mg/dL   --   2.3   --    --    --   2.0   --          Assessment:    Closed 2-part intertrochanteric fracture of left femur:   s/p ORIF 12/11/18     Dementia    Type II diabetes mellitus:  Controlled.  Would d/c metformin.     Benign essential hypertension    Tachycardia:  Controlled w addition of Lopressor.     Hypothyroidism    Slow transit constipation    Fall    DUB (dysfunctional uterine bleeding):  Megace, ASA on hold.  Minimal bleeding at present.     ABLA primarily 2nd to DUB.   Much improved w 1 U PRBCs.         Plan:  Please see above.    D/C planning.     Cresencio Qureshi MD  12/17/2018  1:19 PM

## 2018-12-17 NOTE — PROGRESS NOTES
Continued Stay Note  [facility]     Patient Name: Sienna Ortiz  MRN: [id number]  Today's Date: 12/17/2018    Admit Date: 12/9/2018    Discharge Plan     Row Name 12/17/18 1601       Plan    Plan  [facility] for skilled care- pre cert needed.   LILIAN Mott    Patient/Family in Agreement with Plan  yes    Plan Comments  Per Lorie  ( [phone]) new pre cert needed.  Requested [facility] submit for new pre cert.  Plan [facility] for skilled care.  LILIAN Mott    Row Name 12/17/18 1435       Plan    Plan  [facility] for skilled care- Lorie checking pre cert.   LILIAN Mott    Patient/Family in Agreement with Plan  yes    Plan Comments  Pt did not discharge on 12/14 due to vaginal bleeding.   Dr. Qureshi states pt is ready for discharge.  Lorie  ( [phone]) called regarding pre cert for skilled care at [facility].   Plan [facility]- checking status of pre cert.   LILIAN Mott        Discharge Codes    No documentation.       Expected Discharge Date and Time     Expected Discharge Date Expected Discharge Time    Dec 14, 2018             Eileen Crenshaw, LILIAN

## 2018-12-17 NOTE — PLAN OF CARE
Problem: Patient Care Overview  Goal: Plan of Care Review  Outcome: Ongoing (interventions implemented as appropriate)   12/17/18 0423   Coping/Psychosocial   Plan of Care Reviewed With patient   Plan of Care Review   Progress no change   OTHER   Outcome Summary pt oriented to self. iline in place right arm. blood infusing. turned every 2 hours. incontinence care as needed. small amount of vaginal beeding noted. will continue to monitor.      Goal: Individualization and Mutuality  Outcome: Ongoing (interventions implemented as appropriate)    Goal: Discharge Needs Assessment  Outcome: Ongoing (interventions implemented as appropriate)      Problem: Fall Risk (Adult)  Goal: Absence of Fall  Outcome: Ongoing (interventions implemented as appropriate)      Problem: Skin Injury Risk (Adult)  Goal: Skin Health and Integrity  Outcome: Ongoing (interventions implemented as appropriate)      Problem: Fracture Orthopaedic (Adult)  Goal: Signs and Symptoms of Listed Potential Problems Will be Absent, Minimized or Managed (Fracture Orthopaedic)  Outcome: Ongoing (interventions implemented as appropriate)

## 2018-12-17 NOTE — PROGRESS NOTES
Continued Stay Note  [facility]     Patient Name: Sienna Ortiz  MRN: [id number]  Today's Date: 12/17/2018    Admit Date: 12/9/2018    Discharge Plan     Row Name 12/17/18 1435       Plan    Plan  Plan [facility] skilled care- Lorie checking pre cert.   LILIAN Mott    Patient/Family in Agreement with Plan  yes    Plan Comments  Pt did not discharge on 12/14 due to vaginal bleeding.   Dr. Qureshi states pt is ready for discharge.  Lorie  ( [phone]) called regarding pre cert for skilled care at [facility].   Plan [facility]- checking status of pre cert.   LILIAN Mott        Discharge Codes    No documentation.       Expected Discharge Date and Time     Expected Discharge Date Expected Discharge Time    Dec 14, 2018             Eileen Crenshaw, RN

## 2018-12-17 NOTE — PLAN OF CARE
Problem: Patient Care Overview  Goal: Plan of Care Review  Outcome: Ongoing (interventions implemented as appropriate)   12/17/18 4587   Coping/Psychosocial   Plan of Care Reviewed With patient   Plan of Care Review   Progress improving   OTHER   Outcome Summary Pt limited activity due to pain and refusing to amb

## 2018-12-18 NOTE — PROGRESS NOTES
[facility]    Physicians Statement of Medical Necessity for Ambulance Transportation    It is medically necessary for:    Patient Name: Sienna Ortiz    Insurance Information:  [employer] M/C    To be transported by ambulance:    From (if nursing facility, specify level of care: skilled, long-term, etc)   [facility]    To (specify level of care if nursing facility):  [facility]    Date of Service: 12/18/2018  For dialysis patients state date dialysis began: N/A    Diagnosis: CLOSED W PART INTERTROCHANTERIC FRACTURE OF LEFT FEMUR, DYSFUNCTIONAL UTERINE BLEEDING, FALL, SLOW TRANSIT CONSTIPATION, TYPE II DM, HYPOTHYRODISM, DEMENTIA    Past Medical/Surgical History:  Past Medical History:   Diagnosis Date   • Anemia, pernicious 10/12/2014   • Benign essential hypertension 10/12/2014   • Cerebellar ataxia (CMS/[facility]) 10/22/2013   • Dementia 02/19/2014   • Hyperlipidemia    • Hypothyroidism    • Type II diabetes mellitus (CMS/[facility])    • Vitamin D deficiency 07/26/2011    unspecified      Past Surgical History:   Procedure Laterality Date   • REPLACEMENT TOTAL HIP LATERAL POSITION Right         Current Objective Medical Evidence(including physical exam finding to support reason for limitations):    End stage Alzheimer's disease    Other:  MAX ASSIST    Physician Signature:           (RN,NP,PA,CAN, Discharge Planner) Date/Time: 12/18/18 @ 1300     Printed Name:    ____NEERAJ MCMAHON RN_____________________________    [facility]   Phone: [phone] Phone: [phone]   Fax: [phone] Fax: [phone]

## 2018-12-18 NOTE — SIGNIFICANT NOTE
12/18/18 1317   Rehab Treatment   Discipline physical therapy assistant   Reason Treatment Not Performed (Pt is d/c to SNF)

## 2018-12-18 NOTE — PROGRESS NOTES
Continued Stay Note  [facility]     Patient Name: Sienna Ortiz  MRN: [id number]  Today's Date: 12/18/2018    Admit Date: 12/9/2018    Discharge Plan     Row Name 12/18/18 1250       Plan    Plan  Plan [facility] for skilled care- pre cert obtained.   LILIAN Mott    Patient/Family in Agreement with Plan  yes    Plan Comments   Lorie   ( [phone]) called and states [facility] has obtained pre cert.  Spoke with pt and pt's son  (Abiel Ortiz [phone]) .  Pt and son agreeable to [facility].  Pt's son stated last week  he prefers ambulance transport.  Pt and son aware payment for ambulance cannot be guaranteed.  [facility] called and  time set for 16: 45.  Packet to RN.  Plan [facility] for skilled care.  LILIAN Mott        Discharge Codes    No documentation.       Expected Discharge Date and Time     Expected Discharge Date Expected Discharge Time    Dec 18, 2018             Eileen Crenshaw, RN

## 2018-12-18 NOTE — PLAN OF CARE
Problem: Patient Care Overview  Goal: Plan of Care Review  Outcome: Ongoing (interventions implemented as appropriate)   12/18/18 0321   Coping/Psychosocial   Plan of Care Reviewed With patient   Plan of Care Review   Progress improving   OTHER   Outcome Summary pt resting in bed quietly. oriented to person only. pleasantly confused. pt turned every 2 hours. vaginal bleeding is intermittent and very mild. no clots seen. will continue to monitor.      Goal: Individualization and Mutuality  Outcome: Ongoing (interventions implemented as appropriate)    Goal: Discharge Needs Assessment  Outcome: Ongoing (interventions implemented as appropriate)      Problem: Fall Risk (Adult)  Goal: Absence of Fall  Outcome: Ongoing (interventions implemented as appropriate)      Problem: Skin Injury Risk (Adult)  Goal: Skin Health and Integrity  Outcome: Ongoing (interventions implemented as appropriate)      Problem: Fracture Orthopaedic (Adult)  Goal: Signs and Symptoms of Listed Potential Problems Will be Absent, Minimized or Managed (Fracture Orthopaedic)  Outcome: Ongoing (interventions implemented as appropriate)

## 2018-12-19 NOTE — PROGRESS NOTES
Case Management Discharge Note    Final Note: Pt discharged to [facility] for skilled care on 12/18.   JESSICA Crenshaw RN    Destination - Selection Complete      Service Provider Request Status Selected Services Address Phone Number Fax Number    [facility] [address] [phone]      Durable Medical Equipment      No service has been selected for the patient.      Dialysis/Infusion      No service has been selected for the patient.      Home Medical Care      No service has been selected for the patient.      Community Resources      No service has been selected for the patient.        Ambulance: [facility]    Final Discharge Disposition Code: 03 - skilled nursing facility (SNF)

## 2018-12-20 NOTE — TELEPHONE ENCOUNTER
Call return to the rehabilitation center.  It was unable to reach nurse but it did leave a message that the patient had been on Tylenol only while she was in the hospital.  She had not required narcotics.  Have recommended that if the patient is having pain that is not relieved with the Tylenol that they would have their physician there order her something else per MW M

## 2019-01-01 ENCOUNTER — HOSPITAL ENCOUNTER (EMERGENCY)
Facility: HOSPITAL | Age: 84
End: 2019-02-07
Attending: EMERGENCY MEDICINE | Admitting: EMERGENCY MEDICINE

## 2019-01-01 ENCOUNTER — TELEPHONE (OUTPATIENT)
Dept: ORTHOPEDIC SURGERY | Facility: CLINIC | Age: 84
End: 2019-01-01

## 2019-01-01 DIAGNOSIS — I46.9 DEATH DUE TO CARDIAC ARREST (HCC): Primary | ICD-10-CM

## 2019-01-01 PROCEDURE — 99284 EMERGENCY DEPT VISIT MOD MDM: CPT

## 2019-02-08 NOTE — ED TRIAGE NOTES
Ems  Was called out for soa that started today. Upon arrival by ems to er state that she became bradycardic and unresponsive. Upon arrival to room, no spontaneous resp present no palpable pulse. dnr at bedside. md notified and called at 1900

## 2019-02-08 NOTE — ED NOTES
[facility], [facility] called to ask about next of kin     Nida Heredia, RN  02/07/19 2042

## 2019-02-08 NOTE — ED PROVIDER NOTES
EMERGENCY DEPARTMENT ENCOUNTER    CHIEF COMPLAINT  Chief Complaint: SOA  History given by: EMS  History limited by: pt unresponsive   Room Number: ERIC/ERIC  PMD: Sloane Villagomez MD      HPI:  Pt is a 89 y.o. female who presents via EMS from NH with SOA PTA. EMS reports that upon arrival to the ED that pt became bradycardic, decreased respirations, and unresponsive. EMS states that per NH, pt was at baseline prior. NH reports that pt went to have a bowel movement and after had acute wheezes. EMS states pt had wheezes upon their arrival and was in resp distress.  EKG showed a 1st degree AV block.  She deteriorated en route and was being ventilated via BVM on my arrival to the [facility].  Pt is a DNR with documentation at bedside.     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   • Dementia 02/19/2014   • Type II diabetes mellitus (CMS/[facility])    • Hyperlipidemia    • Benign essential hypertension 10/12/2014   • Hypothyroidism    • Vitamin D deficiency 07/26/2011   • Closed fracture of right hip with routine healing 11/04/2015   • Slow transit constipation 11/04/2015   • Fall 12/09/2018   • Closed 2-part intertrochanteric fracture of left femur (CMS/HCC) 12/09/2018   • DUB (dysfunctional uterine bleeding) 12/15/2018     Resolved Ambulatory Problems     Diagnosis Date Noted   • No Resolved Ambulatory Problems     Past Medical History:   Diagnosis Date   • Anemia, pernicious 10/12/2014   • Benign essential hypertension 10/12/2014   • 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   • Dementia 02/19/2014   • Hyperlipidemia    • Hypothyroidism    • Type II diabetes mellitus (CMS/HCC)    • Vitamin D deficiency 07/26/2011       PAST SURGICAL HISTORY  Past Surgical History:    Procedure Laterality Date   • FEMUR IM NAILING/RODDING Left 12/11/2018    Procedure: HIP TROCANTERIC NAILING LONG WITH INTRAMEDULLARY HIP SCREW;  Surgeon: Jorge Capone MD;  Location: [facility];  Service: Orthopedics   • REPLACEMENT TOTAL HIP LATERAL POSITION Right        FAMILY HISTORY  Family History   Problem Relation Age of Onset   • Hypertension Other        SOCIAL HISTORY  Social History     Socioeconomic History   • Marital status: Single     Spouse name: Not on file   • Number of children: Not on file   • Years of education: Not on file   • Highest education level: Not on file   Social Needs   • Financial resource strain: Not on file   • Food insecurity - worry: Not on file   • Food insecurity - inability: Not on file   • Transportation needs - medical: Not on file   • Transportation needs - non-medical: Not on file   Occupational History   • Not on file   Tobacco Use   • Smoking status: Never Smoker   Substance and Sexual Activity   • Alcohol use: No   • Drug use: Not on file   • Sexual activity: Not on file   Other Topics Concern   • Not on file   Social History Narrative   • Not on file       ALLERGIES  Patient has no known allergies.    REVIEW OF SYSTEMS  Review of Systems   Unable to perform ROS: Patient unresponsive       PHYSICAL EXAM  ED Triage Vitals   Temp Pulse Resp BP SpO2   -- -- -- -- --      Temp src Heart Rate Source Patient Position BP Location FiO2 (%)   -- -- -- -- --       Physical Exam   Constitutional:   Pt is unresponsive   HENT:   Head: Normocephalic and atraumatic.   Eyes:   Pupils are mid position and fixed.   Cardiovascular:   No heart sounds or spontaneous pulses   Pulmonary/Chest:   No spontaneous respirations    Neurological:   Pt does not react to verbal or painful stimuli.    Nursing note and vitals reviewed.          PROGRESS AND CONSULTS       1930-DNR paperwork at bedside. There are no spontaneous pulses or respirations at this time.  Monitor shows  asystole. Time of death called.     -Meet with pt's family. Informed them of pt's condition and that pt likely  in the ambulance prior to getting into the ED. Informed family that I am unsure of exactly what caused her passing but that she was never responsive for me. Son states that he was at NH with pt when EMS was called and that this was expected.  My clinical suspicion, since she had recent hip surgery, is that a massive PE was the cause of death.      MDM  Number of Diagnoses or Management Options     Amount and/or Complexity of Data Reviewed  Decide to obtain previous medical records or to obtain history from someone other than the patient: yes  Obtain history from someone other than the patient: yes (EMS)           DIAGNOSIS  Final diagnoses:   None       DISPOSITION       Latest Documented Vital Signs:  As of 11:57 PM  BP- (!) 0/0 HR- (!) 0 Temp-   O2 sat-      --  Documentation assistance provided by minnie Brown for MD Stacey.  Information recorded by the scribe was done at my direction and has been verified and validated by me.          Loli Brown  [phone]       Mitchell Valdovinos MD  [phone]

## 2019-02-08 NOTE — ED NOTES
Talked to Abiel (son) about coming down hospital. States that will be on way in 10 mins     Nida Heredia, RN  02/07/19 2044

## 2021-01-13 NOTE — PROGRESS NOTES
Brief Postoperative Note      Patient: Keira Ryder  YOB: 1950  MRN: [id number]    Date of Procedure: 1/13/2021    Pre-Op Diagnosis: BLADDER CANCER INVOLVING RIGHT URETERAL ORIFICE    Post-Op Diagnosis: Same       Procedure(s):  BLADDER BIOPSY  RIGHT RETROGRADE PYELOGRAM  RIGHT URETEROSCOPY RIGHT STENT REMOVAL, DOUBLE J STENT REPLACED  TURBT Large  Surgeon(s):  Ye Gordon MD    Assistant:  * No surgical staff found *    Anesthesia: General    Estimated Blood Loss (mL): Minimal    Complications: None    Specimens:   ID Type Source Tests Collected by Time Destination   A : RIGHT BLADDER TUMOR Tissue Bladder SURGICAL PATHOLOGY Ye Gordon MD 1/13/2021 1017        Implants:  Implant Name Type Inv.  Item Serial No.  Lot No. LRB No. Used Action   STENT URET 6FR L24CM POLYMER BLEND PH FREE COAT GRADUAL TAPR  STENT URET 6FR L24CM POLYMER BLEND [place] [employer] FREE COAT GRADUAL TAPR  [employer] [id number] Right 1 Implanted         Drains:   Urethral Catheter Non-latex 16 fr (Active)       Findings: invasive tumor distal ureter resected STENT CHANGED 6x24    Electronically signed by Ye Gordon MD on 1/13/2021 at 10:30 AM [facility]    Physicians Statement of Medical Necessity for Ambulance Transportation    It is medically necessary for:    Patient Name: Sienna Ortiz    Insurance Information:  [employer] M/C    To be transported by ambulance:    From (if nursing facility, specify level of care: skilled, [facility], etc):  [facility]    To (specify level of care if nursing facility): [facility]    Date of Service: 12/14/18    For dialysis patients state date dialysis began: N/A    Diagnosis: CLOSED TWO PART INTERTROCHANTERIC FRACTURE OF LEFT FEMU, FALL, SLOW TRANSIT CONSTIPATION, TYPE II DIABETES, DEMENTIA    Past Medical/Surgical History:  Past Medical History:   Diagnosis Date   • Anemia, pernicious 10/12/2014   • Benign essential hypertension 10/12/2014   • Cerebellar ataxia (CMS/[facility]) 10/22/2013   • Dementia 02/19/2014   • Hyperlipidemia    • Hypothyroidism    • Type II diabetes mellitus (CMS/[facility])    • Vitamin D deficiency 07/26/2011    unspecified      Past Surgical History:   Procedure Laterality Date   • REPLACEMENT TOTAL HIP LATERAL POSITION Right         Current Objective Medical Evidence(including physical exam finding to support reason for limitations):    End stage Alzheimer's disease    Other:  PT WITH LIMITED MOBILITY    Physician Signature:           (RN,NP,PA,CAN, Discharge Planner) Date/Time: 12/14/18  @ 6560     Printed Name:    _____NEERAJ MCMAHON RN_____________________________    [facility]   Phone: [phone] Phone: [phone]   Fax: [phone] Fax: [phone]

## (undated) DEVICE — DRSNG WND BORDR/ADHS NONADHR/GZ LF 4X14IN STRL

## (undated) DEVICE — PK HIP PINNING 40

## (undated) DEVICE — BIT DRL 3FLUT QC NDL PT 4.2X145MM STRL

## (undated) DEVICE — SUT VIC 0 CT1 36IN J946H

## (undated) DEVICE — ANTIBACTERIAL UNDYED BRAIDED (POLYGLACTIN 910), SYNTHETIC ABSORBABLE SUTURE: Brand: COATED VICRYL

## (undated) DEVICE — SUT VIC 1 CT1 36IN J947H

## (undated) DEVICE — DRAPE,REIN 53X77,STERILE: Brand: MEDLINE

## (undated) DEVICE — STPLR SKIN VISISTAT WD 35CT

## (undated) DEVICE — APPL CHLORAPREP W/TINT 26ML ORNG

## (undated) DEVICE — GLV SURG TRIUMPH CLASSIC PF LTX 8 STRL

## (undated) DEVICE — GW FOR TROCH NAIL 3.2X400MM

## (undated) DEVICE — Device

## (undated) DEVICE — DRSNG GZ PETROLTM XEROFORM CURAD 1X8IN STRL

## (undated) DEVICE — BNDG ELAS CO-FLEX SLF ADHR 4IN5YD LF STRL

## (undated) DEVICE — OCCLUSIVE GAUZE STRIP,3% BISMUTH TRIBROMOPHENATE IN PETROLATUM BLEND: Brand: XEROFORM

## (undated) DEVICE — PREP SOL POVIDONE/IODINE BT 4OZ

## (undated) DEVICE — LEGGINGS, PAIR, CLEAR, STERILE: Brand: MEDLINE

## (undated) DEVICE — BANDAGE,GAUZE,BULKEE II,4.5"X4.1YD,STRL: Brand: MEDLINE

## (undated) DEVICE — GLV SURG BIOGEL LTX PF 8

## (undated) DEVICE — CONTAINER,SPECIMEN,OR STERILE,4OZ: Brand: MEDLINE

## (undated) DEVICE — STERILE CAST PADDING KIT: Brand: CARDINAL HEALTH

## (undated) DEVICE — MAT FLR ABSORBENT LG 4FT 10 2.5FT

## (undated) DEVICE — MEDICINE CUP, GRADUATED, STER: Brand: MEDLINE

## (undated) DEVICE — DRSNG SURESITE WNDW 4X4.5

## (undated) DEVICE — GUIDE WIRE, BALL-TIPPED, STERILE

## (undated) DEVICE — SPNG GZ WOVN 4X4IN 12PLY 10/BX STRL